# Patient Record
Sex: FEMALE | Race: WHITE | NOT HISPANIC OR LATINO | Employment: OTHER | URBAN - METROPOLITAN AREA
[De-identification: names, ages, dates, MRNs, and addresses within clinical notes are randomized per-mention and may not be internally consistent; named-entity substitution may affect disease eponyms.]

---

## 2017-03-13 ENCOUNTER — OFFICE VISIT (OUTPATIENT)
Dept: CARDIOLOGY | Facility: CLINIC | Age: 67
End: 2017-03-13

## 2017-03-13 VITALS
SYSTOLIC BLOOD PRESSURE: 130 MMHG | HEART RATE: 57 BPM | RESPIRATION RATE: 18 BRPM | HEIGHT: 64 IN | WEIGHT: 201.6 LBS | BODY MASS INDEX: 34.42 KG/M2 | DIASTOLIC BLOOD PRESSURE: 80 MMHG

## 2017-03-13 DIAGNOSIS — E78.2 MIXED HYPERLIPIDEMIA: ICD-10-CM

## 2017-03-13 DIAGNOSIS — R07.2 PRECORDIAL PAIN: Primary | ICD-10-CM

## 2017-03-13 DIAGNOSIS — Z82.49 FAMILY HISTORY OF EARLY CAD: ICD-10-CM

## 2017-03-13 DIAGNOSIS — I10 ESSENTIAL HYPERTENSION: ICD-10-CM

## 2017-03-13 PROCEDURE — 93000 ELECTROCARDIOGRAM COMPLETE: CPT | Performed by: INTERNAL MEDICINE

## 2017-03-13 PROCEDURE — 99214 OFFICE O/P EST MOD 30 MIN: CPT | Performed by: INTERNAL MEDICINE

## 2017-03-13 RX ORDER — LAMOTRIGINE 100 MG/1
100 TABLET ORAL 2 TIMES DAILY
COMMUNITY

## 2017-03-13 NOTE — PROGRESS NOTES
PATIENTINFORMATION    Date of Office Visit: 2017  Encounter Provider: Elsy King MD  Place of Service: [facility] CARDIOLOGY  Patient Name: Geraldine Holloway  : 1950    Subjective:     Encounter Date:2017      Patient ID: Geraldine Holloway is a 66 y.o. female.      History of Present Illness     This is a lady with risk factors that include hypertension, hyperlipidemia and a strong family history of heart disease, premature coronary artery disease.   I saw her in  for chest pain.  At that time, I did a nuclear stress test which was normal.      She comes in today with complaints of chest pain.  She noticed this last summer while she was gardening.  About two weeks ago she was doing housework when she had the chest pain, which she describes as a tightness in her chest.  There is no radiation.  It is associated with shortness of breath, but not nausea or diaphoresis.  It last about five minute and was better when she rested.  Since that time, though, she has done housework again and has not had any chest pain.         Review of Systems   Constitution: Positive for malaise/fatigue. Negative for fever, weight gain and weight loss.   HENT: Positive for headaches. Negative for ear pain, hearing loss, nosebleeds and sore throat.    Eyes: Negative for double vision, pain, vision loss in left eye and vision loss in right eye.   Cardiovascular:        See history of present illness.   Respiratory: Positive for shortness of breath. Negative for cough, sleep disturbances due to breathing, snoring and wheezing.    Endocrine: Negative for cold intolerance, heat intolerance and polyuria.   Skin: Negative for itching, poor wound healing and rash.   Musculoskeletal: Positive for joint pain. Negative for joint swelling and myalgias.   Gastrointestinal: Negative for abdominal pain, diarrhea, hematochezia, nausea and vomiting.   Genitourinary: Negative for hematuria and hesitancy.  "  Neurological: Negative for numbness, paresthesias and seizures.   Psychiatric/Behavioral: Positive for depression. The patient is nervous/anxious.            ECG 12 Lead  Date/Time: 3/13/2017 12:03 PM  Performed by: MARCELA BAGLEY  Authorized by: MARCELA BAGLEY   Previous ECG: no previous ECG available  Rhythm: sinus rhythm  BPM: 57  Conduction: conduction normal  Clinical impression: normal ECG               Objective:       Visit Vitals   • /80 (BP Location: Right arm, Patient Position: Sitting, Cuff Size: Adult)   • Pulse 57   • Resp 18   • Ht 64\" (162.6 cm)   • Wt 201 lb 9.6 oz (91.4 kg)   • BMI 34.6 kg/m2    Body mass index is 34.6 kg/(m^2).     Physical Exam   Constitutional: She appears well-developed.   HENT:   Head: Normocephalic and atraumatic.   Eyes: Conjunctivae and lids are normal. Pupils are equal, round, and reactive to light. Lids are everted and swept, no foreign bodies found.   Neck: Normal range of motion. No JVD present. Carotid bruit is not present. No tracheal deviation present. No thyroid mass present.   Cardiovascular: Normal rate, regular rhythm and normal heart sounds.    Pulses:       Dorsalis pedis pulses are 2+ on the right side, and 2+ on the left side.   Pulmonary/Chest: Effort normal and breath sounds normal.   Abdominal: Normal appearance and bowel sounds are normal.   Musculoskeletal: Normal range of motion.   Neurological: She is alert. She has normal strength.   Skin: Skin is warm, dry and intact.   Psychiatric: She has a normal mood and affect. Her behavior is normal.   Vitals reviewed.          Assessment/Plan:        1. Chest pain.  I am certainly concerned.  She has a very strong family history of premature coronary artery disease. I am going to check a PET stress test.    2. Hypertension.  Her blood pressure is controlled.    3. Hyperlipidemia.   She is on Pravachol.     4. Hypothyroidism.    5. Seizure disorder.         I will call her to go over the results of " her nuclear stress test when it is available.         Orders Placed This Encounter   Procedures   • Stress Test With Pet Myocardial Perfusion (Multi-Study)     Standing Status:   Future     Standing Expiration Date:   3/13/2018     Order Specific Question:   What stress agent will be used?     Answer:   Regadenoson (Lexiscan)     Order Specific Question:   Difficulty walking criteria?     Answer:   Musculoskeletal (hips, knees, feet, back, amputee)     Order Specific Question:   Reason for exam?     Answer:   Chest Pain   • ECG 12 Lead     This order was created via procedure documentation      Geraldine Holloway   Home Medication Instructions JACI:    Printed on:03/13/17 0557   Medication Information                      atenolol (TENORMIN) 50 MG tablet  Take 1 tablet by mouth.             Calcium Citrate-Vitamin D 250-200 MG-UNIT tablet  Take  by mouth Daily.             Calcium-Phosphorus-Vitamin D 250-107-500 MG-MG-UNIT chewable tablet  Chew.             clonazePAM (KlonoPIN) 0.5 MG tablet  Take  by mouth 3 (Three) Times a Day.             Cyanocobalamin (VITAMIN B-12 PO)  Take  by mouth.             lamoTRIgine (LaMICtal) 100 MG tablet  Take 100 mg by mouth 2 (Two) Times a Day.             levETIRAcetam (KEPPRA) 1000 MG tablet  Take  by mouth.             levothyroxine (SYNTHROID, LEVOTHROID) 25 MCG tablet  Take 25 mcg by mouth.             losartan-hydrochlorothiazide (HYZAAR) 100-12.5 MG per tablet  Take 1 tablet by mouth.             mometasone (NASONEX) 50 MCG/ACT nasal spray  into each nostril.             Multiple Vitamin tablet  Take  by mouth.             Omega-3 Fatty Acids (FISH OIL PO)  Take  by mouth.             pravastatin (PRAVACHOL) 80 MG tablet               sertraline (ZOLOFT) 100 MG tablet  Take  by mouth.                        Elsy King MD  03/13/17  12:06 PM

## 2017-03-20 ENCOUNTER — HOSPITAL ENCOUNTER (OUTPATIENT)
Dept: CARDIOLOGY | Facility: HOSPITAL | Age: 67
Discharge: HOME | End: 2017-03-20
Attending: INTERNAL MEDICINE | Admitting: INTERNAL MEDICINE

## 2017-03-20 DIAGNOSIS — R07.2 PRECORDIAL PAIN: ICD-10-CM

## 2017-03-20 DIAGNOSIS — E78.2 MIXED HYPERLIPIDEMIA: ICD-10-CM

## 2017-03-20 DIAGNOSIS — I10 ESSENTIAL HYPERTENSION: ICD-10-CM

## 2017-03-20 LAB
BH CV NUCLEAR PRIOR STUDY: 3
BH CV STRESS BP STAGE 1: NORMAL
BH CV STRESS COMMENTS STAGE 1: NORMAL
BH CV STRESS DOSE REGADENOSON STAGE 1: 0.4
BH CV STRESS DURATION MIN STAGE 1: 0
BH CV STRESS DURATION SEC STAGE 1: 15
BH CV STRESS HR STAGE 1: 90
BH CV STRESS PROTOCOL 1: NORMAL
BH CV STRESS RECOVERY BP: NORMAL MMHG
BH CV STRESS RECOVERY HR: 77 BPM
BH CV STRESS STAGE 1: 1
LV EF NUC BP: 77 %
MAXIMAL PREDICTED HEART RATE: 153 BPM
PERCENT MAX PREDICTED HR: 58.82 %
STRESS BASELINE BP: NORMAL MMHG
STRESS BASELINE HR: 53 BPM
STRESS PERCENT HR: 69 %
STRESS POST EXERCISE DUR SEC: 15 SEC
STRESS POST PEAK BP: NORMAL MMHG
STRESS POST PEAK HR: 90 BPM
STRESS TARGET HR: 130 BPM

## 2017-03-20 PROCEDURE — 25010000002 REGADENOSON 0.4 MG/5ML SOLUTION: Performed by: INTERNAL MEDICINE

## 2017-03-20 PROCEDURE — 93018 CV STRESS TEST I&R ONLY: CPT | Performed by: INTERNAL MEDICINE

## 2017-03-20 PROCEDURE — 78492 MYOCRD IMG PET MLT RST&STRS: CPT

## 2017-03-20 PROCEDURE — 93016 CV STRESS TEST SUPVJ ONLY: CPT | Performed by: INTERNAL MEDICINE

## 2017-03-20 PROCEDURE — 0 RUBIDIUM CHLORIDE: Performed by: INTERNAL MEDICINE

## 2017-03-20 PROCEDURE — A9555 RB82 RUBIDIUM: HCPCS | Performed by: INTERNAL MEDICINE

## 2017-03-20 PROCEDURE — 93017 CV STRESS TEST TRACING ONLY: CPT

## 2017-03-20 PROCEDURE — 78492 MYOCRD IMG PET MLT RST&STRS: CPT | Performed by: INTERNAL MEDICINE

## 2017-03-20 RX ADMIN — RUBIDIUM CHLORIDE RB-82 1 DOSE: 150 INJECTION, SOLUTION INTRAVENOUS at 09:27

## 2017-03-20 RX ADMIN — RUBIDIUM CHLORIDE RB-82 1 DOSE: 150 INJECTION, SOLUTION INTRAVENOUS at 09:37

## 2017-03-20 RX ADMIN — REGADENOSON 0.4 MG: 0.08 INJECTION, SOLUTION INTRAVENOUS at 09:37

## 2017-03-21 ENCOUNTER — TELEPHONE (OUTPATIENT)
Dept: CARDIOLOGY | Facility: CLINIC | Age: 67
End: 2017-03-21

## 2019-09-28 ENCOUNTER — APPOINTMENT (OUTPATIENT)
Dept: CT IMAGING | Facility: HOSPITAL | Age: 69
End: 2019-09-28

## 2019-09-28 ENCOUNTER — HOSPITAL ENCOUNTER (EMERGENCY)
Facility: HOSPITAL | Age: 69
Discharge: HOME | End: 2019-09-28
Attending: EMERGENCY MEDICINE | Admitting: EMERGENCY MEDICINE

## 2019-09-28 VITALS
TEMPERATURE: 98.7 F | RESPIRATION RATE: 16 BRPM | BODY MASS INDEX: 35.61 KG/M2 | OXYGEN SATURATION: 96 % | HEIGHT: 64 IN | WEIGHT: 208.56 LBS | SYSTOLIC BLOOD PRESSURE: 146 MMHG | DIASTOLIC BLOOD PRESSURE: 71 MMHG | HEART RATE: 54 BPM

## 2019-09-28 DIAGNOSIS — H81.10 BENIGN PAROXYSMAL VERTIGO, UNSPECIFIED LATERALITY: Primary | ICD-10-CM

## 2019-09-28 LAB
ALBUMIN SERPL-MCNC: 4.4 G/DL (ref 3.5–5.2)
ALBUMIN/GLOB SERPL: 2.3 G/DL
ALP SERPL-CCNC: 93 U/L (ref 39–117)
ALT SERPL W P-5'-P-CCNC: 14 U/L (ref 1–33)
ANION GAP SERPL CALCULATED.3IONS-SCNC: 8.5 MMOL/L (ref 5–15)
AST SERPL-CCNC: 19 U/L (ref 1–32)
BASOPHILS # BLD AUTO: 0.06 10*3/MM3 (ref 0–0.2)
BASOPHILS NFR BLD AUTO: 1.3 % (ref 0–1.5)
BILIRUB SERPL-MCNC: 0.5 MG/DL (ref 0.2–1.2)
BUN BLD-MCNC: 15 MG/DL (ref 8–23)
BUN/CREAT SERPL: 17.4 (ref 7–25)
CALCIUM SPEC-SCNC: 9.2 MG/DL (ref 8.6–10.5)
CHLORIDE SERPL-SCNC: 108 MMOL/L (ref 98–107)
CO2 SERPL-SCNC: 28.5 MMOL/L (ref 22–29)
CREAT BLD-MCNC: 0.86 MG/DL (ref 0.57–1)
DEPRECATED RDW RBC AUTO: 44.6 FL (ref 37–54)
EOSINOPHIL # BLD AUTO: 0.13 10*3/MM3 (ref 0–0.4)
EOSINOPHIL NFR BLD AUTO: 2.8 % (ref 0.3–6.2)
ERYTHROCYTE [DISTWIDTH] IN BLOOD BY AUTOMATED COUNT: 13.3 % (ref 12.3–15.4)
GFR SERPL CREATININE-BSD FRML MDRD: 65 ML/MIN/1.73
GLOBULIN UR ELPH-MCNC: 1.9 GM/DL
GLUCOSE BLD-MCNC: 92 MG/DL (ref 65–99)
HCT VFR BLD AUTO: 39.8 % (ref 34–46.6)
HGB BLD-MCNC: 13.4 G/DL (ref 12–15.9)
HOLD SPECIMEN: NORMAL
HOLD SPECIMEN: NORMAL
IMM GRANULOCYTES # BLD AUTO: 0.01 10*3/MM3 (ref 0–0.05)
IMM GRANULOCYTES NFR BLD AUTO: 0.2 % (ref 0–0.5)
INR PPP: 1.05 (ref 0.9–1.1)
LYMPHOCYTES # BLD AUTO: 2.04 10*3/MM3 (ref 0.7–3.1)
LYMPHOCYTES NFR BLD AUTO: 43.2 % (ref 19.6–45.3)
MCH RBC QN AUTO: 30.3 PG (ref 26.6–33)
MCHC RBC AUTO-ENTMCNC: 33.7 G/DL (ref 31.5–35.7)
MCV RBC AUTO: 90 FL (ref 79–97)
MONOCYTES # BLD AUTO: 0.34 10*3/MM3 (ref 0.1–0.9)
MONOCYTES NFR BLD AUTO: 7.2 % (ref 5–12)
NEUTROPHILS # BLD AUTO: 2.14 10*3/MM3 (ref 1.7–7)
NEUTROPHILS NFR BLD AUTO: 45.3 % (ref 42.7–76)
NRBC BLD AUTO-RTO: 0 /100 WBC (ref 0–0.2)
PLATELET # BLD AUTO: 188 10*3/MM3 (ref 140–450)
PMV BLD AUTO: 9 FL (ref 6–12)
POTASSIUM BLD-SCNC: 3.9 MMOL/L (ref 3.5–5.2)
PROT SERPL-MCNC: 6.3 G/DL (ref 6–8.5)
PROTHROMBIN TIME: 13.4 SECONDS (ref 11.7–14.2)
RBC # BLD AUTO: 4.42 10*6/MM3 (ref 3.77–5.28)
SODIUM BLD-SCNC: 145 MMOL/L (ref 136–145)
WBC NRBC COR # BLD: 4.72 10*3/MM3 (ref 3.4–10.8)
WHOLE BLOOD HOLD SPECIMEN: NORMAL
WHOLE BLOOD HOLD SPECIMEN: NORMAL

## 2019-09-28 PROCEDURE — 93010 ELECTROCARDIOGRAM REPORT: CPT | Performed by: INTERNAL MEDICINE

## 2019-09-28 PROCEDURE — 99284 EMERGENCY DEPT VISIT MOD MDM: CPT

## 2019-09-28 PROCEDURE — 85610 PROTHROMBIN TIME: CPT | Performed by: EMERGENCY MEDICINE

## 2019-09-28 PROCEDURE — 80053 COMPREHEN METABOLIC PANEL: CPT | Performed by: EMERGENCY MEDICINE

## 2019-09-28 PROCEDURE — 85025 COMPLETE CBC W/AUTO DIFF WBC: CPT | Performed by: EMERGENCY MEDICINE

## 2019-09-28 PROCEDURE — 93005 ELECTROCARDIOGRAM TRACING: CPT | Performed by: EMERGENCY MEDICINE

## 2019-09-28 PROCEDURE — 70450 CT HEAD/BRAIN W/O DYE: CPT

## 2019-09-28 RX ORDER — SODIUM CHLORIDE 0.9 % (FLUSH) 0.9 %
10 SYRINGE (ML) INJECTION AS NEEDED
Status: DISCONTINUED | OUTPATIENT
Start: 2019-09-28 | End: 2019-09-28 | Stop reason: HOSPADM

## 2019-09-28 RX ORDER — ZONISAMIDE 100 MG/1
200 CAPSULE ORAL NIGHTLY
COMMUNITY

## 2019-09-28 RX ORDER — PROCHLORPERAZINE MALEATE 5 MG/1
5 TABLET ORAL EVERY 6 HOURS PRN
COMMUNITY

## 2019-09-28 RX ADMIN — SODIUM CHLORIDE 1000 ML: 9 INJECTION, SOLUTION INTRAVENOUS at 16:39

## 2019-11-01 ENCOUNTER — TELEPHONE (OUTPATIENT)
Dept: NEUROSURGERY | Facility: CLINIC | Age: 69
End: 2019-11-01

## 2019-11-01 NOTE — TELEPHONE ENCOUNTER
I did not speak with patient, however, I called and spoke with her and she needs a record release form.    I told her that I would mail her one today

## 2019-11-01 NOTE — TELEPHONE ENCOUNTER
"PT CALLED, STATES SHE SPOKE WITH DR COVARRUBIAS'S NURSE A FEW MINUTES AGO, BUT DIDN'T REMEMBER THE NAME. SHE STATES SHE HAS HAD A STROKE AND WE HAD SENT HER \"A RELEASE\" BUT HER  INADVERTENTLY TORE IT WHILE OPENING THE ENVELOPE AND NEEDS ANOTHER ONE.  I WAS UNABLE TO FIND A TELEPHONE NOTE, A LETTER OR ANYTHING SCANNED INTO MEDIA FOLDER, AND PT WAS LAST SEEN IN 2009 BY ISAIAS MCMAHON.  ANY IDEA WHO MAY HAVE HELPED HER OR WHO HAS THIS RELEASE?    CALLBACK: [phone]  "

## 2020-01-29 ENCOUNTER — TRANSCRIBE ORDERS (OUTPATIENT)
Dept: ADMINISTRATIVE | Facility: HOSPITAL | Age: 70
End: 2020-01-29

## 2020-01-29 DIAGNOSIS — N95.0 POSTMENOPAUSAL BLEEDING: Primary | ICD-10-CM

## 2020-02-03 ENCOUNTER — HOSPITAL ENCOUNTER (OUTPATIENT)
Dept: ULTRASOUND IMAGING | Facility: HOSPITAL | Age: 70
Discharge: HOME | End: 2020-02-03
Admitting: OBSTETRICS & GYNECOLOGY

## 2020-02-03 DIAGNOSIS — N95.0 POSTMENOPAUSAL BLEEDING: ICD-10-CM

## 2020-02-03 PROCEDURE — 76856 US EXAM PELVIC COMPLETE: CPT

## 2020-02-03 PROCEDURE — 76830 TRANSVAGINAL US NON-OB: CPT

## 2020-04-27 ENCOUNTER — PREP FOR SURGERY (OUTPATIENT)
Dept: OTHER | Facility: HOSPITAL | Age: 70
End: 2020-04-27

## 2020-04-27 DIAGNOSIS — N81.4 UTEROVAGINAL PROLAPSE: Primary | ICD-10-CM

## 2020-04-27 DIAGNOSIS — N39.3 SUI (STRESS URINARY INCONTINENCE, FEMALE): ICD-10-CM

## 2020-04-27 PROBLEM — N81.2 UTEROVAGINAL PROLAPSE, INCOMPLETE: Status: ACTIVE | Noted: 2020-04-27

## 2020-04-27 RX ORDER — SODIUM CHLORIDE 0.9 % (FLUSH) 0.9 %
3 SYRINGE (ML) INJECTION EVERY 12 HOURS SCHEDULED
Status: CANCELLED | OUTPATIENT
Start: 2020-06-02

## 2020-04-27 RX ORDER — SODIUM CHLORIDE 0.9 % (FLUSH) 0.9 %
10 SYRINGE (ML) INJECTION AS NEEDED
Status: CANCELLED | OUTPATIENT
Start: 2020-06-02

## 2020-04-27 RX ORDER — PHENAZOPYRIDINE HYDROCHLORIDE 200 MG/1
200 TABLET, FILM COATED ORAL ONCE
Status: CANCELLED | OUTPATIENT
Start: 2020-06-02 | End: 2020-04-27

## 2020-04-27 RX ORDER — CEFAZOLIN SODIUM 2 G/100ML
2 INJECTION, SOLUTION INTRAVENOUS ONCE
Status: CANCELLED | OUTPATIENT
Start: 2020-06-02 | End: 2020-04-27

## 2020-05-11 ENCOUNTER — TRANSCRIBE ORDERS (OUTPATIENT)
Dept: ADMINISTRATIVE | Facility: HOSPITAL | Age: 70
End: 2020-05-11

## 2020-05-11 DIAGNOSIS — R31.0 GROSS HEMATURIA: Primary | ICD-10-CM

## 2020-05-12 ENCOUNTER — APPOINTMENT (OUTPATIENT)
Dept: PREADMISSION TESTING | Facility: HOSPITAL | Age: 70
End: 2020-05-12

## 2020-05-12 ENCOUNTER — TRANSCRIBE ORDERS (OUTPATIENT)
Dept: SLEEP MEDICINE | Facility: HOSPITAL | Age: 70
End: 2020-05-12

## 2020-05-12 VITALS
BODY MASS INDEX: 34.74 KG/M2 | HEIGHT: 64 IN | SYSTOLIC BLOOD PRESSURE: 146 MMHG | OXYGEN SATURATION: 98 % | DIASTOLIC BLOOD PRESSURE: 77 MMHG | TEMPERATURE: 97.7 F | WEIGHT: 203.5 LBS | RESPIRATION RATE: 18 BRPM | HEART RATE: 62 BPM

## 2020-05-12 DIAGNOSIS — Z01.818 OTHER SPECIFIED PRE-OPERATIVE EXAMINATION: Primary | ICD-10-CM

## 2020-05-12 LAB
ANION GAP SERPL CALCULATED.3IONS-SCNC: 11.6 MMOL/L (ref 5–15)
BUN BLD-MCNC: 20 MG/DL (ref 8–23)
BUN/CREAT SERPL: 22.5 (ref 7–25)
CALCIUM SPEC-SCNC: 9.4 MG/DL (ref 8.6–10.5)
CHLORIDE SERPL-SCNC: 105 MMOL/L (ref 98–107)
CO2 SERPL-SCNC: 24.4 MMOL/L (ref 22–29)
CREAT BLD-MCNC: 0.89 MG/DL (ref 0.57–1)
DEPRECATED RDW RBC AUTO: 44.3 FL (ref 37–54)
ERYTHROCYTE [DISTWIDTH] IN BLOOD BY AUTOMATED COUNT: 13.3 % (ref 12.3–15.4)
GFR SERPL CREATININE-BSD FRML MDRD: 63 ML/MIN/1.73
GLUCOSE BLD-MCNC: 101 MG/DL (ref 65–99)
HCT VFR BLD AUTO: 36.5 % (ref 34–46.6)
HGB BLD-MCNC: 12.5 G/DL (ref 12–15.9)
MCH RBC QN AUTO: 30.6 PG (ref 26.6–33)
MCHC RBC AUTO-ENTMCNC: 34.2 G/DL (ref 31.5–35.7)
MCV RBC AUTO: 89.5 FL (ref 79–97)
PLATELET # BLD AUTO: 192 10*3/MM3 (ref 140–450)
PMV BLD AUTO: 9.1 FL (ref 6–12)
POTASSIUM BLD-SCNC: 3.3 MMOL/L (ref 3.5–5.2)
RBC # BLD AUTO: 4.08 10*6/MM3 (ref 3.77–5.28)
SODIUM BLD-SCNC: 141 MMOL/L (ref 136–145)
WBC NRBC COR # BLD: 4.85 10*3/MM3 (ref 3.4–10.8)

## 2020-05-12 PROCEDURE — 93010 ELECTROCARDIOGRAM REPORT: CPT | Performed by: INTERNAL MEDICINE

## 2020-05-12 PROCEDURE — 85027 COMPLETE CBC AUTOMATED: CPT | Performed by: OBSTETRICS & GYNECOLOGY

## 2020-05-12 PROCEDURE — 93005 ELECTROCARDIOGRAM TRACING: CPT

## 2020-05-12 PROCEDURE — 36415 COLL VENOUS BLD VENIPUNCTURE: CPT

## 2020-05-12 PROCEDURE — 80048 BASIC METABOLIC PNL TOTAL CA: CPT | Performed by: OBSTETRICS & GYNECOLOGY

## 2020-05-12 NOTE — DISCHARGE INSTRUCTIONS
Take the following medications the morning of surgery:  CLONAZEPAM.LAMOTRIGINE,LEVETIRACETAM,LEVOTHYROXINE AND SERTRALINE      General Instructions: CLEAR LIQUIDS UNTIL 4:30 AM MORNING OF SURGERY  • Do not eat solid food after midnight the night before surgery.  • You may drink clear liquids day of surgery but must stop at least one hour before your hospital arrival time.  • It is beneficial for you to have a clear drink that contains carbohydrates the day of surgery.  We suggest a 12 to 20 ounce bottle of Gatorade or Powerade for non-diabetic patients or a 12 to 20 ounce bottle of G2 or Powerade Zero for diabetic patients. (Pediatric patients, are not advised to drink a 12 to 20 ounce carbohydrate drink)    Clear liquids are liquids you can see through.  Nothing red in color.     Plain water                               Sports drinks  Sodas                                   Gelatin (Jell-O)  Fruit juices without pulp such as white grape juice and apple juice  Popsicles that contain no fruit or yogurt  Tea or coffee (no cream or milk added)  Gatorade / Powerade  G2 / Powerade Zero    • Infants may have breast milk up to four hours before surgery.  • Infants drinking formula may drink formula up to six hours before surgery.   • Patients who avoid smoking, chewing tobacco and alcohol for 4 weeks prior to surgery have a reduced risk of post-operative complications.  Quit smoking as many days before surgery as you can.  • Do not smoke, use chewing tobacco or drink alcohol the day of surgery.   • If applicable bring your C-PAP/ BI-PAP machine.  • Bring any papers given to you in the doctor’s office.  • Wear clean comfortable clothes.  • Do not wear contact lenses, false eyelashes or make-up.  Bring a case for your glasses.   • Bring crutches or walker if applicable.  • Remove all piercings.  Leave jewelry and any other valuables at home.  • Hair extensions with metal clips must be removed prior to surgery.  • The  Pre-Admission Testing nurse will instruct you to bring medications if unable to obtain an accurate list in Pre-Admission Testing.        If you were given a blood bank ID arm band remember to bring it with you the day of surgery.    Preventing a Surgical Site Infection:  • For 2 to 3 days before surgery, avoid shaving with a razor because the razor can irritate skin and make it easier to develop an infection.    • Any areas of open skin can increase the risk of a post-operative wound infection by allowing bacteria to enter and travel throughout the body.  Notify your surgeon if you have any skin wounds / rashes even if it is not near the expected surgical site.  The area will need assessed to determine if surgery should be delayed until it is healed.  • The night prior to surgery shower using a fresh bar of anti-bacterial soap (such as Dial) and clean washcloth.  Sleep in a clean bed with clean clothing.  Do not allow pets to sleep with you.  • Shower on the morning of surgery using a fresh bar of anti-bacterial soap (such as Dial) and clean washcloth.  Dry with a clean towel and dress in clean clothing.  • Ask your surgeon if you will be receiving antibiotics prior to surgery.  • Make sure you, your family, and all healthcare providers clean their hands with soap and water or an alcohol based hand  before caring for you or your wound.    Day of surgery: 6/2/2020 ARRIVAL TIME 5:30 AM  Your arrival time is approximately two hours before your scheduled surgery time.  Upon arrival, a Pre-op nurse and Anesthesiologist will review your health history, obtain vital signs, and answer questions you may have.  The only belongings needed at this time will be a list of your home medications and if applicable your C-PAP/BI-PAP machine.  If you are staying overnight your family can leave the rest of your belongings in the car and bring them to your room later.  A Pre-op nurse will start an IV and you may receive  medication in preparation for surgery, including something to help you relax.  Your family will be able to see you in the Pre-op area.  Two visitors at a time will be allowed in the Pre-op room.  While you are in surgery your family should notify the waiting room  if they leave the waiting room area and provide a contact phone number.    Please be aware that surgery does come with discomfort.  We want to make every effort to control your discomfort so please discuss any uncontrolled symptoms with your nurse.   Your doctor will most likely have prescribed pain medications.      If you are going home after surgery you will receive individualized written care instructions before being discharged.  A responsible adult must drive you to and from the hospital on the day of your surgery and stay with you for 24 hours.    If you are staying overnight following surgery, you will be transported to your hospital room following the recovery period.  [facility] has all private rooms.    If you have any questions please call Pre-Admission Testing at [phone].  Deductibles and co-payments are collected on the day of service. Please be prepared to pay the required co-pay, deductible or deposit on the day of service as defined by your plan.    Self-Quarantine Prior to Surgery    • Stay at home. Do not leave your home after you have been given the Covid test for your upcoming surgery.   • Wash your hands often with soap and water for at least 20 seconds especially after you have been in a public place, or after blowing your nose, coughing, or sneezing.  • If soap and water are not readily available, use a hand  that contains at least 60% alcohol. Cover all surfaces of your hands and rub them together until they feel dry.  • Avoid touching your eyes, nose, and mouth with unwashed hands.  • Take everyday precautions to keep space between yourself and others (stay 6 feet away, which is about two  arm lengths).  Remember that some people without symptoms may be able to spread virus.  • You should stay in a specific room away from others if possible.  Stay at least 6 feet away from others in the home if you cannot have a dedicated room to yourself. Do not have visitors.   • Wear a mask around others in your home if you are unable to stay in a separate room or 6 feet apart. If you are unable to wear a mask, have your family member wear a mask if they must be within 6 feet of you.   • Do not snuggle with your pet. While the CDC says there is no evidence that pets can spread COVID-19 or be infected from humans, it is probably best to avoid “petting, snuggling, being kissed or licked and sharing food (during self-quarantine)”, according to the CDC.   • Do not share anything - Have your own utensils, drinking glass, dishes, towels and bedding.   • Do not share a bathroom with others, if possible.   • Clean and disinfect frequently touched services daily. This includes tables, doorknobs, light switches, countertops, handles, desks, phones, keyboards, toilets, faucets, and sinks.  • Do not travel prior to surgery.    Call your surgeon immediately if you experience any of the following symptoms:  • Sore Throat      • Shortness of Breath or difficulty breathing  • Cough  • Chills  • Body soreness or muscle pain  • Headache  • Fever  • New loss of taste or smell  • Do not arrive for your surgery ill.  Your procedure will need to be rescheduled to another time.  You will need to call your physician before the day of surgery to avoid any unnecessary exposure to hospital staff as well as other patients.    CHLORHEXIDINE CLOTH INSTRUCTIONS  The morning of surgery follow these instructions using the Chlorhexidine cloths you've been given.  These steps reduce bacteria on the body.  Do not use the cloths near your eyes, ears mouth, genitalia or on open wounds.  Throw the cloths away after use but do not try to flush them down  a toilet.      • Open and remove one cloth at a time from the package.    • Leave the cloth unfolded and begin the bathing.  • Massage the skin with the cloths using gentle pressure to remove bacteria.  Do not scrub harshly.   • Follow the steps below with one 2% CHG cloth per area (6 total cloths).  • One cloth for neck, shoulders and chest.  • One cloth for both arms, hands, fingers and underarms (do underarms last).  • One cloth for the abdomen followed by groin.  • One cloth for right leg and foot including between the toes.  • One cloth for left leg and foot including between the toes.  • The last cloth is to be used for the back of the neck, back and buttocks.    Allow the CHG to air dry 3 minutes on the skin which will give it time to work and decrease the chance of irritation.  The skin may feel sticky until it is dry.  Do not rinse with water or any other liquid or you will lose the beneficial effects of the CHG.  If mild skin irritation occurs, do rinse the skin to remove the CHG.  Report this to the nurse at time of admission.  Do not apply lotions, creams, ointments, deodorants or perfumes after using the clothes. Dress in clean clothes before coming to the hospital.

## 2020-05-20 ENCOUNTER — HOSPITAL ENCOUNTER (OUTPATIENT)
Dept: CT IMAGING | Facility: HOSPITAL | Age: 70
Discharge: HOME | End: 2020-05-20
Admitting: OBSTETRICS & GYNECOLOGY

## 2020-05-20 DIAGNOSIS — R31.0 GROSS HEMATURIA: ICD-10-CM

## 2020-05-20 PROCEDURE — 74176 CT ABD & PELVIS W/O CONTRAST: CPT

## 2020-05-30 ENCOUNTER — APPOINTMENT (OUTPATIENT)
Dept: LAB | Facility: HOSPITAL | Age: 70
End: 2020-05-30

## 2020-05-30 PROCEDURE — U0004 COV-19 TEST NON-CDC HGH THRU: HCPCS | Performed by: INTERNAL MEDICINE

## 2020-06-01 PROBLEM — N81.12 CYSTOCELE, LATERAL: Status: ACTIVE | Noted: 2020-06-01

## 2020-06-01 PROBLEM — N81.11 CYSTOCELE, MIDLINE: Status: ACTIVE | Noted: 2020-06-01

## 2020-06-01 PROBLEM — N81.6 RECTOCELE: Status: ACTIVE | Noted: 2020-06-01

## 2020-06-01 PROBLEM — N81.5 VAGINAL ENTEROCELE: Status: ACTIVE | Noted: 2020-06-01

## 2020-06-01 PROBLEM — N39.3 FEMALE STRESS INCONTINENCE: Status: ACTIVE | Noted: 2020-06-01

## 2020-06-01 PROBLEM — N81.82 INCOMPETENCE OR WEAKENING OF PUBOCERVICAL TISSUE: Status: ACTIVE | Noted: 2020-06-01

## 2020-06-01 PROBLEM — N81.83 INCOMPETENCE OR WEAKENING OF RECTOVAGINAL TISSUE: Status: ACTIVE | Noted: 2020-06-01

## 2020-06-01 PROBLEM — N81.89 LOSS OF PERINEAL BODY, FEMALE: Status: ACTIVE | Noted: 2020-06-01

## 2020-06-01 LAB
REF LAB TEST METHOD: NORMAL
SARS-COV-2 RNA RESP QL NAA+PROBE: NOT DETECTED

## 2020-06-02 ENCOUNTER — ANESTHESIA (OUTPATIENT)
Dept: PERIOP | Facility: HOSPITAL | Age: 70
End: 2020-06-02

## 2020-06-02 ENCOUNTER — ANESTHESIA EVENT (OUTPATIENT)
Dept: PERIOP | Facility: HOSPITAL | Age: 70
End: 2020-06-02

## 2020-06-02 ENCOUNTER — HOSPITAL ENCOUNTER (INPATIENT)
Facility: HOSPITAL | Age: 70
LOS: 1 days | Discharge: HOME | End: 2020-06-03
Attending: OBSTETRICS & GYNECOLOGY | Admitting: OBSTETRICS & GYNECOLOGY

## 2020-06-02 DIAGNOSIS — N81.4 UTEROVAGINAL PROLAPSE: ICD-10-CM

## 2020-06-02 PROBLEM — E07.9 DISEASE OF THYROID GLAND: Status: ACTIVE | Noted: 2020-06-02

## 2020-06-02 LAB
ABO GROUP BLD: NORMAL
BLD GP AB SCN SERPL QL: NEGATIVE
RH BLD: POSITIVE
T&S EXPIRATION DATE: NORMAL

## 2020-06-02 PROCEDURE — 25010000002 NEOSTIGMINE PER 0.5 MG: Performed by: NURSE ANESTHETIST, CERTIFIED REGISTERED

## 2020-06-02 PROCEDURE — 25010000002 DEXAMETHASONE PER 1 MG: Performed by: NURSE ANESTHETIST, CERTIFIED REGISTERED

## 2020-06-02 PROCEDURE — 0UT94ZZ RESECTION OF UTERUS, PERCUTANEOUS ENDOSCOPIC APPROACH: ICD-10-PCS | Performed by: OBSTETRICS & GYNECOLOGY

## 2020-06-02 PROCEDURE — 0TSD4ZZ REPOSITION URETHRA, PERCUTANEOUS ENDOSCOPIC APPROACH: ICD-10-PCS | Performed by: OBSTETRICS & GYNECOLOGY

## 2020-06-02 PROCEDURE — 25010000002 ONDANSETRON PER 1 MG: Performed by: NURSE ANESTHETIST, CERTIFIED REGISTERED

## 2020-06-02 PROCEDURE — 25010000002 FENTANYL CITRATE (PF) 100 MCG/2ML SOLUTION: Performed by: NURSE ANESTHETIST, CERTIFIED REGISTERED

## 2020-06-02 PROCEDURE — 0UT24ZZ RESECTION OF BILATERAL OVARIES, PERCUTANEOUS ENDOSCOPIC APPROACH: ICD-10-PCS | Performed by: OBSTETRICS & GYNECOLOGY

## 2020-06-02 PROCEDURE — 88305 TISSUE EXAM BY PATHOLOGIST: CPT | Performed by: OBSTETRICS & GYNECOLOGY

## 2020-06-02 PROCEDURE — 25010000003 CEFAZOLIN IN DEXTROSE 2-4 GM/100ML-% SOLUTION: Performed by: OBSTETRICS & GYNECOLOGY

## 2020-06-02 PROCEDURE — 0JQC3ZZ REPAIR PELVIC REGION SUBCUTANEOUS TISSUE AND FASCIA, PERCUTANEOUS APPROACH: ICD-10-PCS | Performed by: OBSTETRICS & GYNECOLOGY

## 2020-06-02 PROCEDURE — 25010000002 PROPOFOL 10 MG/ML EMULSION: Performed by: NURSE ANESTHETIST, CERTIFIED REGISTERED

## 2020-06-02 PROCEDURE — 86850 RBC ANTIBODY SCREEN: CPT | Performed by: OBSTETRICS & GYNECOLOGY

## 2020-06-02 PROCEDURE — 86900 BLOOD TYPING SEROLOGIC ABO: CPT | Performed by: OBSTETRICS & GYNECOLOGY

## 2020-06-02 PROCEDURE — 0UQF3ZZ REPAIR CUL-DE-SAC, PERCUTANEOUS APPROACH: ICD-10-PCS | Performed by: OBSTETRICS & GYNECOLOGY

## 2020-06-02 PROCEDURE — 88302 TISSUE EXAM BY PATHOLOGIST: CPT | Performed by: OBSTETRICS & GYNECOLOGY

## 2020-06-02 PROCEDURE — 86901 BLOOD TYPING SEROLOGIC RH(D): CPT | Performed by: OBSTETRICS & GYNECOLOGY

## 2020-06-02 PROCEDURE — 0TJB8ZZ INSPECTION OF BLADDER, VIA NATURAL OR ARTIFICIAL OPENING ENDOSCOPIC: ICD-10-PCS | Performed by: OBSTETRICS & GYNECOLOGY

## 2020-06-02 PROCEDURE — 0UT74ZZ RESECTION OF BILATERAL FALLOPIAN TUBES, PERCUTANEOUS ENDOSCOPIC APPROACH: ICD-10-PCS | Performed by: OBSTETRICS & GYNECOLOGY

## 2020-06-02 DEVICE — GRFT TISS STRATTICE FIRM 6X10CM: Type: IMPLANTABLE DEVICE | Status: FUNCTIONAL

## 2020-06-02 RX ORDER — FLUMAZENIL 0.1 MG/ML
0.2 INJECTION INTRAVENOUS AS NEEDED
Status: DISCONTINUED | OUTPATIENT
Start: 2020-06-02 | End: 2020-06-02 | Stop reason: HOSPADM

## 2020-06-02 RX ORDER — ONDANSETRON 2 MG/ML
4 INJECTION INTRAMUSCULAR; INTRAVENOUS EVERY 6 HOURS PRN
Status: DISCONTINUED | OUTPATIENT
Start: 2020-06-02 | End: 2020-06-03 | Stop reason: HOSPADM

## 2020-06-02 RX ORDER — PROMETHAZINE HYDROCHLORIDE 25 MG/ML
12.5 INJECTION, SOLUTION INTRAMUSCULAR; INTRAVENOUS EVERY 6 HOURS PRN
Status: DISCONTINUED | OUTPATIENT
Start: 2020-06-02 | End: 2020-06-03 | Stop reason: HOSPADM

## 2020-06-02 RX ORDER — HYDRALAZINE HYDROCHLORIDE 20 MG/ML
5 INJECTION INTRAMUSCULAR; INTRAVENOUS
Status: DISCONTINUED | OUTPATIENT
Start: 2020-06-02 | End: 2020-06-02 | Stop reason: HOSPADM

## 2020-06-02 RX ORDER — FENTANYL CITRATE 50 UG/ML
50 INJECTION, SOLUTION INTRAMUSCULAR; INTRAVENOUS
Status: DISCONTINUED | OUTPATIENT
Start: 2020-06-02 | End: 2020-06-02 | Stop reason: HOSPADM

## 2020-06-02 RX ORDER — MORPHINE SULFATE 2 MG/ML
1 INJECTION, SOLUTION INTRAMUSCULAR; INTRAVENOUS
Status: DISCONTINUED | OUTPATIENT
Start: 2020-06-02 | End: 2020-06-03 | Stop reason: HOSPADM

## 2020-06-02 RX ORDER — OXYCODONE AND ACETAMINOPHEN 10; 325 MG/1; MG/1
1 TABLET ORAL EVERY 4 HOURS PRN
Status: DISCONTINUED | OUTPATIENT
Start: 2020-06-02 | End: 2020-06-03 | Stop reason: HOSPADM

## 2020-06-02 RX ORDER — DEXAMETHASONE SODIUM PHOSPHATE 10 MG/ML
INJECTION INTRAMUSCULAR; INTRAVENOUS AS NEEDED
Status: DISCONTINUED | OUTPATIENT
Start: 2020-06-02 | End: 2020-06-02 | Stop reason: SURG

## 2020-06-02 RX ORDER — FAMOTIDINE 10 MG/ML
20 INJECTION, SOLUTION INTRAVENOUS ONCE
Status: COMPLETED | OUTPATIENT
Start: 2020-06-02 | End: 2020-06-02

## 2020-06-02 RX ORDER — PROPOFOL 10 MG/ML
VIAL (ML) INTRAVENOUS AS NEEDED
Status: DISCONTINUED | OUTPATIENT
Start: 2020-06-02 | End: 2020-06-02 | Stop reason: SURG

## 2020-06-02 RX ORDER — ACETAMINOPHEN 325 MG/1
650 TABLET ORAL ONCE AS NEEDED
Status: DISCONTINUED | OUTPATIENT
Start: 2020-06-02 | End: 2020-06-02 | Stop reason: HOSPADM

## 2020-06-02 RX ORDER — PROMETHAZINE HYDROCHLORIDE 25 MG/1
25 SUPPOSITORY RECTAL ONCE AS NEEDED
Status: DISCONTINUED | OUTPATIENT
Start: 2020-06-02 | End: 2020-06-02 | Stop reason: HOSPADM

## 2020-06-02 RX ORDER — PROMETHAZINE HYDROCHLORIDE 25 MG/ML
12.5 INJECTION, SOLUTION INTRAMUSCULAR; INTRAVENOUS ONCE AS NEEDED
Status: DISCONTINUED | OUTPATIENT
Start: 2020-06-02 | End: 2020-06-02 | Stop reason: HOSPADM

## 2020-06-02 RX ORDER — DIPHENHYDRAMINE HYDROCHLORIDE 50 MG/ML
12.5 INJECTION INTRAMUSCULAR; INTRAVENOUS
Status: DISCONTINUED | OUTPATIENT
Start: 2020-06-02 | End: 2020-06-02 | Stop reason: HOSPADM

## 2020-06-02 RX ORDER — CEFAZOLIN SODIUM 2 G/100ML
2 INJECTION, SOLUTION INTRAVENOUS ONCE
Status: COMPLETED | OUTPATIENT
Start: 2020-06-02 | End: 2020-06-02

## 2020-06-02 RX ORDER — EPHEDRINE SULFATE 50 MG/ML
5 INJECTION, SOLUTION INTRAVENOUS ONCE AS NEEDED
Status: DISCONTINUED | OUTPATIENT
Start: 2020-06-02 | End: 2020-06-02 | Stop reason: HOSPADM

## 2020-06-02 RX ORDER — LEVETIRACETAM 500 MG/1
1500 TABLET ORAL 2 TIMES DAILY
Status: DISCONTINUED | OUTPATIENT
Start: 2020-06-02 | End: 2020-06-03 | Stop reason: HOSPADM

## 2020-06-02 RX ORDER — SODIUM CHLORIDE 0.9 % (FLUSH) 0.9 %
10 SYRINGE (ML) INJECTION AS NEEDED
Status: DISCONTINUED | OUTPATIENT
Start: 2020-06-02 | End: 2020-06-02 | Stop reason: HOSPADM

## 2020-06-02 RX ORDER — PHENAZOPYRIDINE HYDROCHLORIDE 200 MG/1
200 TABLET, FILM COATED ORAL ONCE
Status: COMPLETED | OUTPATIENT
Start: 2020-06-02 | End: 2020-06-02

## 2020-06-02 RX ORDER — ALBUTEROL SULFATE 2.5 MG/3ML
2.5 SOLUTION RESPIRATORY (INHALATION) ONCE AS NEEDED
Status: DISCONTINUED | OUTPATIENT
Start: 2020-06-02 | End: 2020-06-02 | Stop reason: HOSPADM

## 2020-06-02 RX ORDER — DEXMEDETOMIDINE HYDROCHLORIDE 4 UG/ML
INJECTION INTRAVENOUS CONTINUOUS PRN
Status: DISCONTINUED | OUTPATIENT
Start: 2020-06-02 | End: 2020-06-02 | Stop reason: SURG

## 2020-06-02 RX ORDER — SODIUM CHLORIDE, SODIUM LACTATE, POTASSIUM CHLORIDE, CALCIUM CHLORIDE 600; 310; 30; 20 MG/100ML; MG/100ML; MG/100ML; MG/100ML
9 INJECTION, SOLUTION INTRAVENOUS CONTINUOUS
Status: DISCONTINUED | OUTPATIENT
Start: 2020-06-02 | End: 2020-06-03 | Stop reason: HOSPADM

## 2020-06-02 RX ORDER — DEXTROSE AND SODIUM CHLORIDE 5; .45 G/100ML; G/100ML
100 INJECTION, SOLUTION INTRAVENOUS CONTINUOUS
Status: DISCONTINUED | OUTPATIENT
Start: 2020-06-02 | End: 2020-06-03 | Stop reason: HOSPADM

## 2020-06-02 RX ORDER — SERTRALINE HYDROCHLORIDE 100 MG/1
100 TABLET, FILM COATED ORAL NIGHTLY
Status: DISCONTINUED | OUTPATIENT
Start: 2020-06-02 | End: 2020-06-03 | Stop reason: HOSPADM

## 2020-06-02 RX ORDER — MAGNESIUM HYDROXIDE 1200 MG/15ML
LIQUID ORAL AS NEEDED
Status: DISCONTINUED | OUTPATIENT
Start: 2020-06-02 | End: 2020-06-02 | Stop reason: HOSPADM

## 2020-06-02 RX ORDER — ROCURONIUM BROMIDE 10 MG/ML
INJECTION, SOLUTION INTRAVENOUS AS NEEDED
Status: DISCONTINUED | OUTPATIENT
Start: 2020-06-02 | End: 2020-06-02 | Stop reason: SURG

## 2020-06-02 RX ORDER — EPHEDRINE SULFATE 50 MG/ML
INJECTION, SOLUTION INTRAVENOUS AS NEEDED
Status: DISCONTINUED | OUTPATIENT
Start: 2020-06-02 | End: 2020-06-02 | Stop reason: SURG

## 2020-06-02 RX ORDER — DIPHENHYDRAMINE HCL 25 MG
25 CAPSULE ORAL
Status: DISCONTINUED | OUTPATIENT
Start: 2020-06-02 | End: 2020-06-02 | Stop reason: HOSPADM

## 2020-06-02 RX ORDER — ATENOLOL 50 MG/1
50 TABLET ORAL NIGHTLY
Status: DISCONTINUED | OUTPATIENT
Start: 2020-06-02 | End: 2020-06-03 | Stop reason: HOSPADM

## 2020-06-02 RX ORDER — CLONAZEPAM 0.5 MG/1
0.5 TABLET ORAL 3 TIMES DAILY
Status: DISCONTINUED | OUTPATIENT
Start: 2020-06-02 | End: 2020-06-03 | Stop reason: HOSPADM

## 2020-06-02 RX ORDER — LIDOCAINE HYDROCHLORIDE 20 MG/ML
INJECTION, SOLUTION INFILTRATION; PERINEURAL AS NEEDED
Status: DISCONTINUED | OUTPATIENT
Start: 2020-06-02 | End: 2020-06-02 | Stop reason: SURG

## 2020-06-02 RX ORDER — IBUPROFEN 400 MG/1
400 TABLET ORAL
Status: DISCONTINUED | OUTPATIENT
Start: 2020-06-02 | End: 2020-06-03 | Stop reason: HOSPADM

## 2020-06-02 RX ORDER — HYDROCHLOROTHIAZIDE 12.5 MG/1
12.5 CAPSULE, GELATIN COATED ORAL
Status: DISCONTINUED | OUTPATIENT
Start: 2020-06-02 | End: 2020-06-03 | Stop reason: HOSPADM

## 2020-06-02 RX ORDER — MORPHINE SULFATE 2 MG/ML
2 INJECTION, SOLUTION INTRAMUSCULAR; INTRAVENOUS
Status: DISCONTINUED | OUTPATIENT
Start: 2020-06-02 | End: 2020-06-03 | Stop reason: HOSPADM

## 2020-06-02 RX ORDER — PROCHLORPERAZINE MALEATE 5 MG/1
5 TABLET ORAL EVERY 6 HOURS PRN
Status: DISCONTINUED | OUTPATIENT
Start: 2020-06-02 | End: 2020-06-03 | Stop reason: HOSPADM

## 2020-06-02 RX ORDER — ESTRADIOL 0.1 MG/G
CREAM VAGINAL AS NEEDED
Status: DISCONTINUED | OUTPATIENT
Start: 2020-06-02 | End: 2020-06-02 | Stop reason: HOSPADM

## 2020-06-02 RX ORDER — LEVOTHYROXINE SODIUM 88 UG/1
TABLET ORAL
COMMUNITY
Start: 2020-05-18 | End: 2022-04-26

## 2020-06-02 RX ORDER — ONDANSETRON 2 MG/ML
INJECTION INTRAMUSCULAR; INTRAVENOUS AS NEEDED
Status: DISCONTINUED | OUTPATIENT
Start: 2020-06-02 | End: 2020-06-02 | Stop reason: SURG

## 2020-06-02 RX ORDER — BUPIVACAINE HYDROCHLORIDE AND EPINEPHRINE 2.5; 5 MG/ML; UG/ML
INJECTION, SOLUTION INFILTRATION; PERINEURAL AS NEEDED
Status: DISCONTINUED | OUTPATIENT
Start: 2020-06-02 | End: 2020-06-02 | Stop reason: HOSPADM

## 2020-06-02 RX ORDER — OXYCODONE HYDROCHLORIDE AND ACETAMINOPHEN 5; 325 MG/1; MG/1
1 TABLET ORAL EVERY 4 HOURS PRN
Status: DISCONTINUED | OUTPATIENT
Start: 2020-06-02 | End: 2020-06-03 | Stop reason: HOSPADM

## 2020-06-02 RX ORDER — LAMOTRIGINE 100 MG/1
100 TABLET ORAL EVERY 12 HOURS SCHEDULED
Status: DISCONTINUED | OUTPATIENT
Start: 2020-06-02 | End: 2020-06-03 | Stop reason: HOSPADM

## 2020-06-02 RX ORDER — CEFAZOLIN SODIUM 2 G/100ML
2 INJECTION, SOLUTION INTRAVENOUS EVERY 8 HOURS
Status: COMPLETED | OUTPATIENT
Start: 2020-06-02 | End: 2020-06-03

## 2020-06-02 RX ORDER — LOSARTAN POTASSIUM 100 MG/1
100 TABLET ORAL
Status: DISCONTINUED | OUTPATIENT
Start: 2020-06-02 | End: 2020-06-03 | Stop reason: HOSPADM

## 2020-06-02 RX ORDER — PROMETHAZINE HYDROCHLORIDE 25 MG/1
25 TABLET ORAL ONCE AS NEEDED
Status: DISCONTINUED | OUTPATIENT
Start: 2020-06-02 | End: 2020-06-02 | Stop reason: HOSPADM

## 2020-06-02 RX ORDER — HYDROCODONE BITARTRATE AND ACETAMINOPHEN 7.5; 325 MG/1; MG/1
1 TABLET ORAL ONCE AS NEEDED
Status: DISCONTINUED | OUTPATIENT
Start: 2020-06-02 | End: 2020-06-02 | Stop reason: HOSPADM

## 2020-06-02 RX ORDER — NALOXONE HCL 0.4 MG/ML
0.2 VIAL (ML) INJECTION AS NEEDED
Status: DISCONTINUED | OUTPATIENT
Start: 2020-06-02 | End: 2020-06-02 | Stop reason: HOSPADM

## 2020-06-02 RX ORDER — NALOXONE HCL 0.4 MG/ML
0.4 VIAL (ML) INJECTION
Status: DISCONTINUED | OUTPATIENT
Start: 2020-06-02 | End: 2020-06-03 | Stop reason: HOSPADM

## 2020-06-02 RX ORDER — GLYCOPYRROLATE 0.2 MG/ML
INJECTION INTRAMUSCULAR; INTRAVENOUS AS NEEDED
Status: DISCONTINUED | OUTPATIENT
Start: 2020-06-02 | End: 2020-06-02 | Stop reason: SURG

## 2020-06-02 RX ORDER — LEVOTHYROXINE SODIUM 88 UG/1
88 TABLET ORAL
Status: DISCONTINUED | OUTPATIENT
Start: 2020-06-03 | End: 2020-06-03 | Stop reason: HOSPADM

## 2020-06-02 RX ORDER — LABETALOL HYDROCHLORIDE 5 MG/ML
5 INJECTION, SOLUTION INTRAVENOUS
Status: DISCONTINUED | OUTPATIENT
Start: 2020-06-02 | End: 2020-06-02 | Stop reason: HOSPADM

## 2020-06-02 RX ORDER — PRAVASTATIN SODIUM 40 MG
80 TABLET ORAL NIGHTLY
Status: DISCONTINUED | OUTPATIENT
Start: 2020-06-02 | End: 2020-06-03 | Stop reason: HOSPADM

## 2020-06-02 RX ORDER — OXYCODONE AND ACETAMINOPHEN 7.5; 325 MG/1; MG/1
1 TABLET ORAL ONCE AS NEEDED
Status: DISCONTINUED | OUTPATIENT
Start: 2020-06-02 | End: 2020-06-02 | Stop reason: HOSPADM

## 2020-06-02 RX ORDER — SODIUM CHLORIDE, SODIUM LACTATE, POTASSIUM CHLORIDE, CALCIUM CHLORIDE 600; 310; 30; 20 MG/100ML; MG/100ML; MG/100ML; MG/100ML
100 INJECTION, SOLUTION INTRAVENOUS CONTINUOUS
Status: DISCONTINUED | OUTPATIENT
Start: 2020-06-02 | End: 2020-06-03 | Stop reason: HOSPADM

## 2020-06-02 RX ORDER — ONDANSETRON 2 MG/ML
4 INJECTION INTRAMUSCULAR; INTRAVENOUS ONCE AS NEEDED
Status: DISCONTINUED | OUTPATIENT
Start: 2020-06-02 | End: 2020-06-02 | Stop reason: HOSPADM

## 2020-06-02 RX ORDER — ONDANSETRON 4 MG/1
4 TABLET, FILM COATED ORAL EVERY 6 HOURS PRN
Status: DISCONTINUED | OUTPATIENT
Start: 2020-06-02 | End: 2020-06-03 | Stop reason: HOSPADM

## 2020-06-02 RX ORDER — PROMETHAZINE HYDROCHLORIDE 25 MG/ML
6.25 INJECTION, SOLUTION INTRAMUSCULAR; INTRAVENOUS
Status: DISCONTINUED | OUTPATIENT
Start: 2020-06-02 | End: 2020-06-02 | Stop reason: HOSPADM

## 2020-06-02 RX ORDER — SODIUM CHLORIDE 0.9 % (FLUSH) 0.9 %
10 SYRINGE (ML) INJECTION EVERY 12 HOURS SCHEDULED
Status: DISCONTINUED | OUTPATIENT
Start: 2020-06-02 | End: 2020-06-02 | Stop reason: HOSPADM

## 2020-06-02 RX ORDER — SODIUM CHLORIDE 0.9 % (FLUSH) 0.9 %
3 SYRINGE (ML) INJECTION EVERY 12 HOURS SCHEDULED
Status: DISCONTINUED | OUTPATIENT
Start: 2020-06-02 | End: 2020-06-02 | Stop reason: HOSPADM

## 2020-06-02 RX ORDER — FENTANYL CITRATE 50 UG/ML
INJECTION, SOLUTION INTRAMUSCULAR; INTRAVENOUS AS NEEDED
Status: DISCONTINUED | OUTPATIENT
Start: 2020-06-02 | End: 2020-06-02 | Stop reason: SURG

## 2020-06-02 RX ORDER — ZONISAMIDE 100 MG/1
200 CAPSULE ORAL NIGHTLY
Status: DISCONTINUED | OUTPATIENT
Start: 2020-06-02 | End: 2020-06-03 | Stop reason: HOSPADM

## 2020-06-02 RX ORDER — HYDROMORPHONE HYDROCHLORIDE 1 MG/ML
0.25 INJECTION, SOLUTION INTRAMUSCULAR; INTRAVENOUS; SUBCUTANEOUS
Status: DISCONTINUED | OUTPATIENT
Start: 2020-06-02 | End: 2020-06-02 | Stop reason: HOSPADM

## 2020-06-02 RX ADMIN — DEXTROSE AND SODIUM CHLORIDE 100 ML/HR: 5; 450 INJECTION, SOLUTION INTRAVENOUS at 20:46

## 2020-06-02 RX ADMIN — ROCURONIUM BROMIDE 10 MG: 10 INJECTION, SOLUTION INTRAVENOUS at 11:06

## 2020-06-02 RX ADMIN — SODIUM CHLORIDE, POTASSIUM CHLORIDE, SODIUM LACTATE AND CALCIUM CHLORIDE: 600; 310; 30; 20 INJECTION, SOLUTION INTRAVENOUS at 13:48

## 2020-06-02 RX ADMIN — IBUPROFEN 400 MG: 400 TABLET, FILM COATED ORAL at 20:46

## 2020-06-02 RX ADMIN — ROCURONIUM BROMIDE 50 MG: 10 INJECTION, SOLUTION INTRAVENOUS at 07:47

## 2020-06-02 RX ADMIN — CEFAZOLIN SODIUM 2 G: 2 INJECTION, SOLUTION INTRAVENOUS at 08:05

## 2020-06-02 RX ADMIN — CLONAZEPAM 0.5 MG: 0.5 TABLET ORAL at 20:46

## 2020-06-02 RX ADMIN — SODIUM CHLORIDE, POTASSIUM CHLORIDE, SODIUM LACTATE AND CALCIUM CHLORIDE 9 ML/HR: 600; 310; 30; 20 INJECTION, SOLUTION INTRAVENOUS at 06:24

## 2020-06-02 RX ADMIN — EPHEDRINE SULFATE 5 MG: 50 INJECTION INTRAVENOUS at 12:56

## 2020-06-02 RX ADMIN — OXYCODONE HYDROCHLORIDE AND ACETAMINOPHEN 1 TABLET: 10; 325 TABLET ORAL at 22:29

## 2020-06-02 RX ADMIN — SODIUM CHLORIDE, POTASSIUM CHLORIDE, SODIUM LACTATE AND CALCIUM CHLORIDE: 600; 310; 30; 20 INJECTION, SOLUTION INTRAVENOUS at 10:04

## 2020-06-02 RX ADMIN — FENTANYL CITRATE 50 MCG: 50 INJECTION INTRAMUSCULAR; INTRAVENOUS at 09:10

## 2020-06-02 RX ADMIN — PHENAZOPYRIDINE 200 MG: 200 TABLET ORAL at 06:24

## 2020-06-02 RX ADMIN — EPHEDRINE SULFATE 10 MG: 50 INJECTION INTRAVENOUS at 13:00

## 2020-06-02 RX ADMIN — FAMOTIDINE 20 MG: 10 INJECTION INTRAVENOUS at 06:24

## 2020-06-02 RX ADMIN — ROCURONIUM BROMIDE 20 MG: 10 INJECTION, SOLUTION INTRAVENOUS at 12:21

## 2020-06-02 RX ADMIN — NEOSTIGMINE METHYLSULFATE 4 MG: 1 INJECTION INTRAMUSCULAR; INTRAVENOUS; SUBCUTANEOUS at 13:59

## 2020-06-02 RX ADMIN — FENTANYL CITRATE 50 MCG: 50 INJECTION INTRAMUSCULAR; INTRAVENOUS at 07:47

## 2020-06-02 RX ADMIN — EPHEDRINE SULFATE 10 MG: 50 INJECTION INTRAVENOUS at 08:21

## 2020-06-02 RX ADMIN — EPHEDRINE SULFATE 10 MG: 50 INJECTION INTRAVENOUS at 07:58

## 2020-06-02 RX ADMIN — DEXMEDETOMIDINE HYDROCHLORIDE 12 MCG/HR: 4 INJECTION INTRAVENOUS at 08:33

## 2020-06-02 RX ADMIN — LEVETIRACETAM 1500 MG: 500 TABLET, FILM COATED ORAL at 20:33

## 2020-06-02 RX ADMIN — LAMOTRIGINE 100 MG: 100 TABLET ORAL at 20:34

## 2020-06-02 RX ADMIN — EPHEDRINE SULFATE 10 MG: 50 INJECTION INTRAVENOUS at 08:06

## 2020-06-02 RX ADMIN — GLYCOPYRROLATE 0.6 MG: 0.2 INJECTION INTRAMUSCULAR; INTRAVENOUS at 13:59

## 2020-06-02 RX ADMIN — EPHEDRINE SULFATE 5 MG: 50 INJECTION INTRAVENOUS at 08:30

## 2020-06-02 RX ADMIN — SERTRALINE 100 MG: 100 TABLET, FILM COATED ORAL at 20:33

## 2020-06-02 RX ADMIN — ONDANSETRON HYDROCHLORIDE 4 MG: 2 SOLUTION INTRAMUSCULAR; INTRAVENOUS at 13:59

## 2020-06-02 RX ADMIN — PRAVASTATIN SODIUM 80 MG: 40 TABLET ORAL at 20:33

## 2020-06-02 RX ADMIN — CEFAZOLIN SODIUM 2 G: 2 INJECTION, SOLUTION INTRAVENOUS at 12:22

## 2020-06-02 RX ADMIN — FENTANYL CITRATE 50 MCG: 50 INJECTION INTRAMUSCULAR; INTRAVENOUS at 11:06

## 2020-06-02 RX ADMIN — LIDOCAINE HYDROCHLORIDE 80 MG: 20 INJECTION, SOLUTION INFILTRATION; PERINEURAL at 07:47

## 2020-06-02 RX ADMIN — FENTANYL CITRATE 50 MCG: 50 INJECTION INTRAMUSCULAR; INTRAVENOUS at 11:31

## 2020-06-02 RX ADMIN — CEFAZOLIN SODIUM 2 G: 2 INJECTION, SOLUTION INTRAVENOUS at 20:46

## 2020-06-02 RX ADMIN — DEXAMETHASONE SODIUM PHOSPHATE 8 MG: 10 INJECTION INTRAMUSCULAR; INTRAVENOUS at 12:34

## 2020-06-02 RX ADMIN — ROCURONIUM BROMIDE 20 MG: 10 INJECTION, SOLUTION INTRAVENOUS at 09:50

## 2020-06-02 RX ADMIN — PROPOFOL 150 MG: 10 INJECTION, EMULSION INTRAVENOUS at 07:47

## 2020-06-02 NOTE — ANESTHESIA PREPROCEDURE EVALUATION
Anesthesia Evaluation     no history of anesthetic complications:               Airway   Mallampati: I  TM distance: >3 FB  Neck ROM: full  Dental - normal exam     Pulmonary    (-) shortness of breath, recent URI  Cardiovascular     (+) hypertension, hyperlipidemia,   (-) dysrhythmias, angina    ROS comment: Borderline T abnormality    Neuro/Psych  (+) seizures, CVA,     (-) dizziness/light headedness, syncope    ROS Comment: Memory difficulty  GI/Hepatic/Renal/Endo    (+) obesity,     (-) liver disease, no renal disease, diabetes    Musculoskeletal     Abdominal    Substance History      OB/GYN          Other   arthritis,                      Anesthesia Plan    ASA 3     general     intravenous induction     Anesthetic plan, all risks, benefits, and alternatives have been provided, discussed and informed consent has been obtained with: patient.

## 2020-06-02 NOTE — ANESTHESIA PROCEDURE NOTES
Airway  Urgency: elective    Date/Time: 6/2/2020 7:43 AM  Airway not difficult    General Information and Staff    Patient location during procedure: OR  Anesthesiologist: Nabila Pettit MD  CRNA: Noemi Ribeiro CRNA    Indications and Patient Condition  Indications for airway management: airway protection    Preoxygenated: yes  MILS not maintained throughout  Mask difficulty assessment: 1 - vent by mask    Final Airway Details  Final airway type: endotracheal airway      Successful airway: ETT  Cuffed: yes   Successful intubation technique: direct laryngoscopy  Facilitating devices/methods: intubating stylet  Endotracheal tube insertion site: oral  Blade: Demetrice  Blade size: 3  ETT size (mm): 7.0  Cormack-Lehane Classification: grade I - full view of glottis  Placement verified by: chest auscultation   Cuff volume (mL): 8  Measured from: lips  Number of attempts at approach: 1  Assessment: lips, teeth, and gum same as pre-op and atraumatic intubation    Additional Comments  PreO2 100% face mask, IV induction, easy mask, DVL x1, cords noted, tube through, cuff up, EBBSH, +etCO2, = chest movement, tube secured in place, atraumatic, teeth and lips intact as preop.

## 2020-06-02 NOTE — ANESTHESIA POSTPROCEDURE EVALUATION
Patient: Geraldine Holloway    Procedure Summary     Date:  06/02/20 Room / Location:  [facility] OR [address] [place] [address] MAIN OR    Anesthesia Start:  0732 Anesthesia Stop:  1427    Procedures:       Laparoscopic uterosacral ligament colpopexy sacral colpopexy  Laparoscopic paravaginal repair Laparoscopic hysterectomy with bilateral salpingectomy Laparoscopic abdominal enterocele repair (N/A Abdomen)      Pubovaginal sling Posterior colporrhaphy  Insertion of vaginal grafts Cystourethroscopy (N/A Vagina) Diagnosis:       Uterovaginal prolapse      (Uterovaginal prolapse [N81.4])    Surgeon:  Geraldine Muñoz MD Provider:  Nabila Pettit MD    Anesthesia Type:  general ASA Status:  3          Anesthesia Type: general    Vitals  Vitals Value Taken Time   /80 6/2/2020  3:30 PM   Temp 36.7 °C (98.1 °F) 6/2/2020  2:21 PM   Pulse 84 6/2/2020  3:32 PM   Resp 16 6/2/2020  3:15 PM   SpO2 96 % 6/2/2020  3:32 PM   Vitals shown include unvalidated device data.        Post Anesthesia Care and Evaluation    Patient location during evaluation: PACU  Anesthetic complications: No anesthetic complications

## 2020-06-03 VITALS
WEIGHT: 202.6 LBS | BODY MASS INDEX: 34.59 KG/M2 | DIASTOLIC BLOOD PRESSURE: 60 MMHG | HEART RATE: 89 BPM | RESPIRATION RATE: 16 BRPM | SYSTOLIC BLOOD PRESSURE: 112 MMHG | OXYGEN SATURATION: 96 % | TEMPERATURE: 97.8 F | HEIGHT: 64 IN

## 2020-06-03 LAB
HCT VFR BLD AUTO: 27.3 % (ref 34–46.6)
HGB BLD-MCNC: 9.5 G/DL (ref 12–15.9)
LAB AP CASE REPORT: NORMAL
LAB AP CLINICAL INFORMATION: NORMAL
PATH REPORT.FINAL DX SPEC: NORMAL
PATH REPORT.GROSS SPEC: NORMAL

## 2020-06-03 PROCEDURE — 85014 HEMATOCRIT: CPT | Performed by: OBSTETRICS & GYNECOLOGY

## 2020-06-03 PROCEDURE — 85018 HEMOGLOBIN: CPT | Performed by: OBSTETRICS & GYNECOLOGY

## 2020-06-03 PROCEDURE — 25010000002 ENOXAPARIN PER 10 MG: Performed by: OBSTETRICS & GYNECOLOGY

## 2020-06-03 PROCEDURE — 25010000003 CEFAZOLIN IN DEXTROSE 2-4 GM/100ML-% SOLUTION: Performed by: OBSTETRICS & GYNECOLOGY

## 2020-06-03 RX ADMIN — HYDROCHLOROTHIAZIDE 12.5 MG: 12.5 CAPSULE ORAL at 09:05

## 2020-06-03 RX ADMIN — CLONAZEPAM 0.5 MG: 0.5 TABLET ORAL at 09:05

## 2020-06-03 RX ADMIN — OXYCODONE HYDROCHLORIDE AND ACETAMINOPHEN 1 TABLET: 10; 325 TABLET ORAL at 13:36

## 2020-06-03 RX ADMIN — CLONAZEPAM 0.5 MG: 0.5 TABLET ORAL at 18:00

## 2020-06-03 RX ADMIN — DEXTROSE AND SODIUM CHLORIDE 100 ML/HR: 5; 450 INJECTION, SOLUTION INTRAVENOUS at 06:23

## 2020-06-03 RX ADMIN — LEVETIRACETAM 1500 MG: 500 TABLET, FILM COATED ORAL at 09:05

## 2020-06-03 RX ADMIN — IBUPROFEN 400 MG: 400 TABLET, FILM COATED ORAL at 09:04

## 2020-06-03 RX ADMIN — IBUPROFEN 400 MG: 400 TABLET, FILM COATED ORAL at 00:57

## 2020-06-03 RX ADMIN — IBUPROFEN 400 MG: 400 TABLET, FILM COATED ORAL at 13:36

## 2020-06-03 RX ADMIN — LOSARTAN POTASSIUM 100 MG: 100 TABLET, FILM COATED ORAL at 09:05

## 2020-06-03 RX ADMIN — IBUPROFEN 400 MG: 400 TABLET, FILM COATED ORAL at 18:00

## 2020-06-03 RX ADMIN — LEVOTHYROXINE SODIUM 88 MCG: 88 TABLET ORAL at 05:04

## 2020-06-03 RX ADMIN — CEFAZOLIN SODIUM 2 G: 2 INJECTION, SOLUTION INTRAVENOUS at 05:02

## 2020-06-03 RX ADMIN — LAMOTRIGINE 100 MG: 100 TABLET ORAL at 09:05

## 2020-06-03 RX ADMIN — OXYCODONE HYDROCHLORIDE AND ACETAMINOPHEN 1 TABLET: 5; 325 TABLET ORAL at 09:51

## 2020-06-03 RX ADMIN — ENOXAPARIN SODIUM 40 MG: 40 INJECTION SUBCUTANEOUS at 09:05

## 2020-06-03 RX ADMIN — IBUPROFEN 400 MG: 400 TABLET, FILM COATED ORAL at 05:02

## 2020-06-03 RX ADMIN — SERTRALINE HYDROCHLORIDE 50 MG: 50 TABLET, FILM COATED ORAL at 05:04

## 2020-06-26 ENCOUNTER — HOSPITAL ENCOUNTER (EMERGENCY)
Facility: HOSPITAL | Age: 70
Discharge: HOME | End: 2020-06-27
Attending: EMERGENCY MEDICINE | Admitting: EMERGENCY MEDICINE

## 2020-06-26 DIAGNOSIS — T14.90XD HEALING WOUND: ICD-10-CM

## 2020-06-26 DIAGNOSIS — T81.31XA BROKEN SUTURE, INITIAL ENCOUNTER: Primary | ICD-10-CM

## 2020-06-26 LAB
BASOPHILS # BLD AUTO: 0.05 10*3/MM3 (ref 0–0.2)
BASOPHILS NFR BLD AUTO: 1.1 % (ref 0–1.5)
DEPRECATED RDW RBC AUTO: 42.9 FL (ref 37–54)
EOSINOPHIL # BLD AUTO: 0.23 10*3/MM3 (ref 0–0.4)
EOSINOPHIL NFR BLD AUTO: 5 % (ref 0.3–6.2)
ERYTHROCYTE [DISTWIDTH] IN BLOOD BY AUTOMATED COUNT: 13.2 % (ref 12.3–15.4)
HCT VFR BLD AUTO: 32.2 % (ref 34–46.6)
HGB BLD-MCNC: 10.9 G/DL (ref 12–15.9)
IMM GRANULOCYTES # BLD AUTO: 0.01 10*3/MM3 (ref 0–0.05)
IMM GRANULOCYTES NFR BLD AUTO: 0.2 % (ref 0–0.5)
LYMPHOCYTES # BLD AUTO: 1.74 10*3/MM3 (ref 0.7–3.1)
LYMPHOCYTES NFR BLD AUTO: 37.9 % (ref 19.6–45.3)
MCH RBC QN AUTO: 30.8 PG (ref 26.6–33)
MCHC RBC AUTO-ENTMCNC: 33.9 G/DL (ref 31.5–35.7)
MCV RBC AUTO: 91 FL (ref 79–97)
MONOCYTES # BLD AUTO: 0.26 10*3/MM3 (ref 0.1–0.9)
MONOCYTES NFR BLD AUTO: 5.7 % (ref 5–12)
NEUTROPHILS # BLD AUTO: 2.3 10*3/MM3 (ref 1.7–7)
NEUTROPHILS NFR BLD AUTO: 50.1 % (ref 42.7–76)
NRBC BLD AUTO-RTO: 0 /100 WBC (ref 0–0.2)
PLATELET # BLD AUTO: 188 10*3/MM3 (ref 140–450)
PMV BLD AUTO: 8.9 FL (ref 6–12)
RBC # BLD AUTO: 3.54 10*6/MM3 (ref 3.77–5.28)
WBC NRBC COR # BLD: 4.59 10*3/MM3 (ref 3.4–10.8)

## 2020-06-26 PROCEDURE — 36415 COLL VENOUS BLD VENIPUNCTURE: CPT

## 2020-06-26 PROCEDURE — 99282 EMERGENCY DEPT VISIT SF MDM: CPT

## 2020-06-26 PROCEDURE — 85025 COMPLETE CBC W/AUTO DIFF WBC: CPT | Performed by: NURSE PRACTITIONER

## 2020-06-27 VITALS
HEART RATE: 61 BPM | DIASTOLIC BLOOD PRESSURE: 79 MMHG | OXYGEN SATURATION: 97 % | SYSTOLIC BLOOD PRESSURE: 157 MMHG | HEIGHT: 64 IN | BODY MASS INDEX: 34.78 KG/M2 | RESPIRATION RATE: 16 BRPM | TEMPERATURE: 98.7 F

## 2022-04-26 ENCOUNTER — HOSPITAL ENCOUNTER (OUTPATIENT)
Dept: GENERAL RADIOLOGY | Facility: HOSPITAL | Age: 72
Discharge: HOME | End: 2022-04-26

## 2022-04-26 ENCOUNTER — PRE-ADMISSION TESTING (OUTPATIENT)
Dept: PREADMISSION TESTING | Facility: HOSPITAL | Age: 72
End: 2022-04-26

## 2022-04-26 VITALS
WEIGHT: 213 LBS | DIASTOLIC BLOOD PRESSURE: 79 MMHG | RESPIRATION RATE: 20 BRPM | SYSTOLIC BLOOD PRESSURE: 141 MMHG | BODY MASS INDEX: 37.74 KG/M2 | HEART RATE: 58 BPM | HEIGHT: 63 IN | TEMPERATURE: 97.3 F | OXYGEN SATURATION: 97 %

## 2022-04-26 LAB
ALBUMIN SERPL-MCNC: 4.8 G/DL (ref 3.5–5.2)
ALBUMIN/GLOB SERPL: 2.1 G/DL
ALP SERPL-CCNC: 110 U/L (ref 39–117)
ALT SERPL W P-5'-P-CCNC: 16 U/L (ref 1–33)
ANION GAP SERPL CALCULATED.3IONS-SCNC: 14 MMOL/L (ref 5–15)
AST SERPL-CCNC: 16 U/L (ref 1–32)
BACTERIA UR QL AUTO: ABNORMAL /HPF
BILIRUB SERPL-MCNC: 0.5 MG/DL (ref 0–1.2)
BILIRUB UR QL STRIP: NEGATIVE
BUN SERPL-MCNC: 19 MG/DL (ref 8–23)
BUN/CREAT SERPL: 17.4 (ref 7–25)
CALCIUM SPEC-SCNC: 9.7 MG/DL (ref 8.6–10.5)
CHLORIDE SERPL-SCNC: 101 MMOL/L (ref 98–107)
CLARITY UR: CLEAR
CO2 SERPL-SCNC: 27 MMOL/L (ref 22–29)
COLOR UR: ABNORMAL
CREAT SERPL-MCNC: 1.09 MG/DL (ref 0.57–1)
DEPRECATED RDW RBC AUTO: 43.3 FL (ref 37–54)
EGFRCR SERPLBLD CKD-EPI 2021: 54.1 ML/MIN/1.73
ERYTHROCYTE [DISTWIDTH] IN BLOOD BY AUTOMATED COUNT: 13 % (ref 12.3–15.4)
GLOBULIN UR ELPH-MCNC: 2.3 GM/DL
GLUCOSE SERPL-MCNC: 93 MG/DL (ref 65–99)
GLUCOSE UR STRIP-MCNC: NEGATIVE MG/DL
HCT VFR BLD AUTO: 41.4 % (ref 34–46.6)
HGB BLD-MCNC: 14.3 G/DL (ref 12–15.9)
HGB UR QL STRIP.AUTO: NEGATIVE
HYALINE CASTS UR QL AUTO: ABNORMAL /LPF
INR PPP: 0.98 (ref 0.9–1.1)
KETONES UR QL STRIP: ABNORMAL
LEUKOCYTE ESTERASE UR QL STRIP.AUTO: ABNORMAL
MCH RBC QN AUTO: 31.8 PG (ref 26.6–33)
MCHC RBC AUTO-ENTMCNC: 34.5 G/DL (ref 31.5–35.7)
MCV RBC AUTO: 92 FL (ref 79–97)
NITRITE UR QL STRIP: NEGATIVE
PH UR STRIP.AUTO: 6 [PH] (ref 5–8)
PLATELET # BLD AUTO: 249 10*3/MM3 (ref 140–450)
PMV BLD AUTO: 9.1 FL (ref 6–12)
POTASSIUM SERPL-SCNC: 3.9 MMOL/L (ref 3.5–5.2)
PROT SERPL-MCNC: 7.1 G/DL (ref 6–8.5)
PROT UR QL STRIP: NEGATIVE
PROTHROMBIN TIME: 12.9 SECONDS (ref 11.7–14.2)
QT INTERVAL: 451 MS
RBC # BLD AUTO: 4.5 10*6/MM3 (ref 3.77–5.28)
RBC # UR STRIP: ABNORMAL /HPF
REF LAB TEST METHOD: ABNORMAL
SODIUM SERPL-SCNC: 142 MMOL/L (ref 136–145)
SP GR UR STRIP: 1.02 (ref 1–1.03)
SQUAMOUS #/AREA URNS HPF: ABNORMAL /HPF
UROBILINOGEN UR QL STRIP: ABNORMAL
WBC # UR STRIP: ABNORMAL /HPF
WBC NRBC COR # BLD: 7.37 10*3/MM3 (ref 3.4–10.8)

## 2022-04-26 PROCEDURE — 85610 PROTHROMBIN TIME: CPT

## 2022-04-26 PROCEDURE — 36415 COLL VENOUS BLD VENIPUNCTURE: CPT

## 2022-04-26 PROCEDURE — 71046 X-RAY EXAM CHEST 2 VIEWS: CPT

## 2022-04-26 PROCEDURE — 81001 URINALYSIS AUTO W/SCOPE: CPT

## 2022-04-26 PROCEDURE — 80053 COMPREHEN METABOLIC PANEL: CPT

## 2022-04-26 PROCEDURE — 85027 COMPLETE CBC AUTOMATED: CPT

## 2022-04-26 PROCEDURE — 93010 ELECTROCARDIOGRAM REPORT: CPT | Performed by: INTERNAL MEDICINE

## 2022-04-26 PROCEDURE — 93005 ELECTROCARDIOGRAM TRACING: CPT

## 2022-04-26 PROCEDURE — 73560 X-RAY EXAM OF KNEE 1 OR 2: CPT

## 2022-04-26 RX ORDER — LEVOTHYROXINE SODIUM 112 UG/1
112 TABLET ORAL
COMMUNITY

## 2022-04-26 RX ORDER — CHLORHEXIDINE GLUCONATE 500 MG/1
1 CLOTH TOPICAL
COMMUNITY
End: 2022-05-11 | Stop reason: HOSPADM

## 2022-04-26 RX ORDER — AZELASTINE 1 MG/ML
2 SPRAY, METERED NASAL 2 TIMES DAILY
COMMUNITY
End: 2023-03-27

## 2022-04-26 RX ORDER — ROSUVASTATIN CALCIUM 10 MG/1
10 TABLET, COATED ORAL NIGHTLY
COMMUNITY

## 2022-04-26 ASSESSMENT — KOOS JR
KOOS JR SCORE: 19
KOOS JR SCORE: 39.625

## 2022-04-26 NOTE — DISCHARGE INSTRUCTIONS
Take the following medications the morning of surgery:    Zoloft   keppra   lamictal  clonazepam  levothyroxine    If you are on prescription narcotic pain medication to control your pain you may also take that medication the morning of surgery.    General Instructions:  Do not eat solid food after midnight the night before surgery.  You may drink clear liquids day of surgery but must stop at least one hour before your hospital arrival time.  It is beneficial for you to have a clear drink that contains carbohydrates the day of surgery.  We suggest a 12 to 20 ounce bottle of Gatorade or Powerade for non-diabetic patients or a 12 to 20 ounce bottle of G2 or Powerade Zero for diabetic patients. (Pediatric patients, are not advised to drink a 12 to 20 ounce carbohydrate drink)    Clear liquids are liquids you can see through.  Nothing red in color.     Plain water                               Sports drinks  Sodas                                   Gelatin (Jell-O)  Fruit juices without pulp such as white grape juice and apple juice  Popsicles that contain no fruit or yogurt  Tea or coffee (no cream or milk added)  Gatorade / Powerade  G2 / Powerade Zero    Infants may have breast milk up to four hours before surgery.  Infants drinking formula may drink formula up to six hours before surgery.   Patients who avoid smoking, chewing tobacco and alcohol for 4 weeks prior to surgery have a reduced risk of post-operative complications.  Quit smoking as many days before surgery as you can.  Do not smoke, use chewing tobacco or drink alcohol the day of surgery.   If applicable bring your C-PAP/ BI-PAP machine.  Bring any papers given to you in the doctor’s office.  Wear clean comfortable clothes.  Do not wear contact lenses, false eyelashes or make-up.  Bring a case for your glasses.   Bring crutches or walker if applicable.  Remove all piercings.  Leave jewelry and any other valuables at home.  Hair extensions with metal clips  must be removed prior to surgery.  The Pre-Admission Testing nurse will instruct you to bring medications if unable to obtain an accurate list in Pre-Admission Testing.        If you were given a blood bank ID arm band remember to bring it with you the day of surgery.    Preventing a Surgical Site Infection:  For 2 to 3 days before surgery, avoid shaving with a razor because the razor can irritate skin and make it easier to develop an infection.    Any areas of open skin can increase the risk of a post-operative wound infection by allowing bacteria to enter and travel throughout the body.  Notify your surgeon if you have any skin wounds / rashes even if it is not near the expected surgical site.  The area will need assessed to determine if surgery should be delayed until it is healed.  The night prior to surgery shower using a fresh bar of anti-bacterial soap (such as Dial) and clean washcloth.  Sleep in a clean bed with clean clothing.  Do not allow pets to sleep with you.  Shower on the morning of surgery using a fresh bar of anti-bacterial soap (such as Dial) and clean washcloth.  Dry with a clean towel and dress in clean clothing.  Ask your surgeon if you will be receiving antibiotics prior to surgery.  Make sure you, your family, and all healthcare providers clean their hands with soap and water or an alcohol based hand  before caring for you or your wound.    Day of surgery:5/10/2022   0900  Your arrival time is approximately two hours before your scheduled surgery time.  Upon arrival, a Pre-op nurse and Anesthesiologist will review your health history, obtain vital signs, and answer questions you may have.  The only belongings needed at this time will be a list of your home medications and if applicable your C-PAP/BI-PAP machine.  A Pre-op nurse will start an IV and you may receive medication in preparation for surgery, including something to help you relax.     Please be aware that surgery does come  with discomfort.  We want to make every effort to control your discomfort so please discuss any uncontrolled symptoms with your nurse.   Your doctor will most likely have prescribed pain medications.      If you are going home after surgery you will receive individualized written care instructions before being discharged.  A responsible adult must drive you to and from the hospital on the day of your surgery and stay with you for 24 hours.  Discharge prescriptions can be filled by the hospital pharmacy during regular pharmacy hours.  If you are having surgery late in the day/evening your prescription may be e-prescribed to your pharmacy.  Please verify your pharmacy hours or chose a 24 hour pharmacy to avoid not having access to your prescription because your pharmacy has closed for the day.    If you are staying overnight following surgery, you will be transported to your hospital room following the recovery period.  [facility] has all private rooms.    If you have any questions please call Pre-Admission Testing at [phone].  Deductibles and co-payments are collected on the day of service. Please be prepared to pay the required co-pay, deductible or deposit on the day of service as defined by your plan.    Patient Education for Self-Quarantine Process    Following your COVID testing, we strongly recommend that you wear a mask when you are with other people and practice social distancing.   Limit your activities to only required outings.  Wash your hands with soap and water frequently for at least 20 seconds.   Avoid touching your eyes, nose and mouth with unwashed hands.  Do not share anything - utensils, drinking glasses, food from the same bowl.   Sanitize household surfaces daily. Include all high touch areas (door handles, light switches, phones, countertops, etc.)    Call your surgeon immediately if you experience any of the following symptoms:  Sore Throat  Shortness of Breath or  difficulty breathing  Cough  Chills  Body soreness or muscle pain  Headache  Fever  New loss of taste or smell  Do not arrive for your surgery ill.  Your procedure will need to be rescheduled to another time.  You will need to call your physician before the day of surgery to avoid any unnecessary exposure to hospital staff as well as other patients.   CHLORHEXIDINE CLOTH INSTRUCTIONS  The morning of surgery follow these instructions using the Chlorhexidine cloths you've been given.  These steps reduce bacteria on the body.  Do not use the cloths near your eyes, ears mouth, genitalia or on open wounds.  Throw the cloths away after use but do not try to flush them down a toilet.      Open and remove one cloth at a time from the package.    Leave the cloth unfolded and begin the bathing.  Massage the skin with the cloths using gentle pressure to remove bacteria.  Do not scrub harshly.   Follow the steps below with one 2% CHG cloth per area (6 total cloths).  One cloth for neck, shoulders and chest.  One cloth for both arms, hands, fingers and underarms (do underarms last).  One cloth for the abdomen followed by groin.  One cloth for right leg and foot including between the toes.  One cloth for left leg and foot including between the toes.  The last cloth is to be used for the back of the neck, back and buttocks.    Allow the CHG to air dry 3 minutes on the skin which will give it time to work and decrease the chance of irritation.  The skin may feel sticky until it is dry.  Do not rinse with water or any other liquid or you will lose the beneficial effects of the CHG.  If mild skin irritation occurs, do rinse the skin to remove the CHG.  Report this to the nurse at time of admission.  Do not apply lotions, creams, ointments, deodorants or perfumes after using the clothes. Dress in clean clothes before coming to the hospital.    BACTROBAN NASAL OINTMENT  There are many germs normally in your nose. Bactroban is an  ointment that will help reduce these germs. Please follow these instructions for Bactroban use:      __1__The day before surgery in the morning  Date5/9/2022_______    __2__The day before surgery in the evening              Date_5/9/2022_______    _3___The day of surgery in the morning    Date__5/10/2022______    **Squirt ½ package of Bactroban Ointment onto a cotton applicator and apply to inside of 1st nostril.  Squirt the remaining Bactroban and apply to the inside of the other nostril.    PERIDEX- ORAL:  Use only if your surgeon has ordered  Use the night before and morning of surgery - Swish, gargle, and spit - do not swallow.

## 2022-05-07 ENCOUNTER — LAB (OUTPATIENT)
Dept: LAB | Facility: HOSPITAL | Age: 72
End: 2022-05-07

## 2022-05-07 LAB — SARS-COV-2 ORF1AB RESP QL NAA+PROBE: NOT DETECTED

## 2022-05-07 PROCEDURE — C9803 HOPD COVID-19 SPEC COLLECT: HCPCS

## 2022-05-07 PROCEDURE — U0004 COV-19 TEST NON-CDC HGH THRU: HCPCS

## 2022-05-10 ENCOUNTER — ANESTHESIA (OUTPATIENT)
Dept: PERIOP | Facility: HOSPITAL | Age: 72
End: 2022-05-10

## 2022-05-10 ENCOUNTER — ANESTHESIA EVENT (OUTPATIENT)
Dept: PERIOP | Facility: HOSPITAL | Age: 72
End: 2022-05-10

## 2022-05-10 ENCOUNTER — HOSPITAL ENCOUNTER (OUTPATIENT)
Facility: HOSPITAL | Age: 72
Setting detail: OBSERVATION
Discharge: HOME | End: 2022-05-11
Attending: ORTHOPAEDIC SURGERY | Admitting: ORTHOPAEDIC SURGERY

## 2022-05-10 ENCOUNTER — APPOINTMENT (OUTPATIENT)
Dept: GENERAL RADIOLOGY | Facility: HOSPITAL | Age: 72
End: 2022-05-10

## 2022-05-10 DIAGNOSIS — M17.11 PRIMARY OSTEOARTHRITIS OF RIGHT KNEE: Primary | ICD-10-CM

## 2022-05-10 DIAGNOSIS — N81.12 CYSTOCELE, LATERAL: ICD-10-CM

## 2022-05-10 PROCEDURE — 97161 PT EVAL LOW COMPLEX 20 MIN: CPT

## 2022-05-10 PROCEDURE — G0378 HOSPITAL OBSERVATION PER HR: HCPCS

## 2022-05-10 PROCEDURE — 25010000002 FENTANYL CITRATE (PF) 50 MCG/ML SOLUTION: Performed by: NURSE ANESTHETIST, CERTIFIED REGISTERED

## 2022-05-10 PROCEDURE — 25010000002 DEXAMETHASONE PER 1 MG: Performed by: NURSE ANESTHETIST, CERTIFIED REGISTERED

## 2022-05-10 PROCEDURE — 25010000002 ONDANSETRON PER 1 MG: Performed by: NURSE ANESTHETIST, CERTIFIED REGISTERED

## 2022-05-10 PROCEDURE — 25010000002 MORPHINE PER 10 MG: Performed by: ORTHOPAEDIC SURGERY

## 2022-05-10 PROCEDURE — 25010000002 CEFAZOLIN IN DEXTROSE 2-4 GM/100ML-% SOLUTION: Performed by: ORTHOPAEDIC SURGERY

## 2022-05-10 PROCEDURE — 25010000002 CEFAZOLIN PER 500 MG: Performed by: ORTHOPAEDIC SURGERY

## 2022-05-10 PROCEDURE — C1776 JOINT DEVICE (IMPLANTABLE): HCPCS | Performed by: ORTHOPAEDIC SURGERY

## 2022-05-10 PROCEDURE — 25010000002 ROPIVACAINE PER 1 MG: Performed by: ANESTHESIOLOGY

## 2022-05-10 PROCEDURE — 25010000002 KETOROLAC TROMETHAMINE PER 15 MG: Performed by: ORTHOPAEDIC SURGERY

## 2022-05-10 PROCEDURE — 25010000002 PROPOFOL 10 MG/ML EMULSION: Performed by: NURSE ANESTHETIST, CERTIFIED REGISTERED

## 2022-05-10 PROCEDURE — 73560 X-RAY EXAM OF KNEE 1 OR 2: CPT

## 2022-05-10 PROCEDURE — 25010000002 NEOSTIGMINE 5 MG/10ML SOLUTION: Performed by: NURSE ANESTHETIST, CERTIFIED REGISTERED

## 2022-05-10 PROCEDURE — C1713 ANCHOR/SCREW BN/BN,TIS/BN: HCPCS | Performed by: ORTHOPAEDIC SURGERY

## 2022-05-10 PROCEDURE — 25010000002 ROPIVACAINE PER 1 MG: Performed by: ORTHOPAEDIC SURGERY

## 2022-05-10 PROCEDURE — 76942 ECHO GUIDE FOR BIOPSY: CPT | Performed by: ORTHOPAEDIC SURGERY

## 2022-05-10 PROCEDURE — 0 MEPIVACAINE HCL (PF) 1.5 % SOLUTION: Performed by: ANESTHESIOLOGY

## 2022-05-10 PROCEDURE — 25010000002 EPINEPHRINE 1 MG/ML SOLUTION 30 ML VIAL: Performed by: ORTHOPAEDIC SURGERY

## 2022-05-10 PROCEDURE — 97110 THERAPEUTIC EXERCISES: CPT

## 2022-05-10 PROCEDURE — 25010000002 HYDROMORPHONE PER 4 MG: Performed by: NURSE ANESTHETIST, CERTIFIED REGISTERED

## 2022-05-10 DEVICE — IMPLANTABLE DEVICE
Type: IMPLANTABLE DEVICE | Site: KNEE | Status: FUNCTIONAL
Brand: VANGUARD® KNEE SYSTEM

## 2022-05-10 DEVICE — IMPLANTABLE DEVICE
Type: IMPLANTABLE DEVICE | Site: KNEE | Status: FUNCTIONAL
Brand: BIOMET KNEE SYSTEM

## 2022-05-10 DEVICE — CAP TOTL KN CMT PRIMARY: Type: IMPLANTABLE DEVICE | Site: KNEE | Status: FUNCTIONAL

## 2022-05-10 DEVICE — IMPLANTABLE DEVICE
Type: IMPLANTABLE DEVICE | Site: KNEE | Status: FUNCTIONAL
Brand: BIOMET® KNEE SYSTEM

## 2022-05-10 RX ORDER — SODIUM CHLORIDE 0.9 % (FLUSH) 0.9 %
3-10 SYRINGE (ML) INJECTION AS NEEDED
Status: DISCONTINUED | OUTPATIENT
Start: 2022-05-10 | End: 2022-05-10 | Stop reason: HOSPADM

## 2022-05-10 RX ORDER — AZELASTINE 1 MG/ML
2 SPRAY, METERED NASAL 2 TIMES DAILY
Status: DISCONTINUED | OUTPATIENT
Start: 2022-05-10 | End: 2022-05-11 | Stop reason: HOSPADM

## 2022-05-10 RX ORDER — PROMETHAZINE HYDROCHLORIDE 25 MG/1
25 SUPPOSITORY RECTAL ONCE AS NEEDED
Status: DISCONTINUED | OUTPATIENT
Start: 2022-05-10 | End: 2022-05-10 | Stop reason: HOSPADM

## 2022-05-10 RX ORDER — FENTANYL CITRATE 50 UG/ML
INJECTION, SOLUTION INTRAMUSCULAR; INTRAVENOUS AS NEEDED
Status: DISCONTINUED | OUTPATIENT
Start: 2022-05-10 | End: 2022-05-10 | Stop reason: SURG

## 2022-05-10 RX ORDER — MORPHINE SULFATE 2 MG/ML
4 INJECTION, SOLUTION INTRAMUSCULAR; INTRAVENOUS
Status: DISCONTINUED | OUTPATIENT
Start: 2022-05-10 | End: 2022-05-11 | Stop reason: HOSPADM

## 2022-05-10 RX ORDER — HYDROMORPHONE HYDROCHLORIDE 1 MG/ML
0.5 INJECTION, SOLUTION INTRAMUSCULAR; INTRAVENOUS; SUBCUTANEOUS
Status: DISCONTINUED | OUTPATIENT
Start: 2022-05-10 | End: 2022-05-10 | Stop reason: HOSPADM

## 2022-05-10 RX ORDER — LIDOCAINE HYDROCHLORIDE 20 MG/ML
INJECTION, SOLUTION INFILTRATION; PERINEURAL AS NEEDED
Status: DISCONTINUED | OUTPATIENT
Start: 2022-05-10 | End: 2022-05-10 | Stop reason: SURG

## 2022-05-10 RX ORDER — ATENOLOL 50 MG/1
50 TABLET ORAL NIGHTLY
Status: DISCONTINUED | OUTPATIENT
Start: 2022-05-10 | End: 2022-05-11 | Stop reason: HOSPADM

## 2022-05-10 RX ORDER — NALOXONE HCL 0.4 MG/ML
0.4 VIAL (ML) INJECTION
Status: DISCONTINUED | OUTPATIENT
Start: 2022-05-10 | End: 2022-05-11 | Stop reason: HOSPADM

## 2022-05-10 RX ORDER — HYDRALAZINE HYDROCHLORIDE 20 MG/ML
5 INJECTION INTRAMUSCULAR; INTRAVENOUS
Status: DISCONTINUED | OUTPATIENT
Start: 2022-05-10 | End: 2022-05-10 | Stop reason: HOSPADM

## 2022-05-10 RX ORDER — FERROUS SULFATE 325(65) MG
325 TABLET ORAL
Status: DISCONTINUED | OUTPATIENT
Start: 2022-05-10 | End: 2022-05-11 | Stop reason: HOSPADM

## 2022-05-10 RX ORDER — PROMETHAZINE HYDROCHLORIDE 25 MG/1
25 TABLET ORAL ONCE AS NEEDED
Status: DISCONTINUED | OUTPATIENT
Start: 2022-05-10 | End: 2022-05-10 | Stop reason: HOSPADM

## 2022-05-10 RX ORDER — LIDOCAINE HYDROCHLORIDE 10 MG/ML
0.5 INJECTION, SOLUTION EPIDURAL; INFILTRATION; INTRACAUDAL; PERINEURAL ONCE AS NEEDED
Status: DISCONTINUED | OUTPATIENT
Start: 2022-05-10 | End: 2022-05-10 | Stop reason: HOSPADM

## 2022-05-10 RX ORDER — CELECOXIB 200 MG/1
200 CAPSULE ORAL ONCE
Status: COMPLETED | OUTPATIENT
Start: 2022-05-10 | End: 2022-05-10

## 2022-05-10 RX ORDER — CLINDAMYCIN PHOSPHATE 900 MG/50ML
900 INJECTION INTRAVENOUS EVERY 8 HOURS
Status: COMPLETED | OUTPATIENT
Start: 2022-05-10 | End: 2022-05-10

## 2022-05-10 RX ORDER — ACETAMINOPHEN 500 MG
1000 TABLET ORAL ONCE
Status: COMPLETED | OUTPATIENT
Start: 2022-05-10 | End: 2022-05-10

## 2022-05-10 RX ORDER — ONDANSETRON 4 MG/1
4 TABLET, FILM COATED ORAL EVERY 6 HOURS PRN
Status: DISCONTINUED | OUTPATIENT
Start: 2022-05-10 | End: 2022-05-11 | Stop reason: HOSPADM

## 2022-05-10 RX ORDER — FENTANYL CITRATE 50 UG/ML
50 INJECTION, SOLUTION INTRAMUSCULAR; INTRAVENOUS
Status: DISCONTINUED | OUTPATIENT
Start: 2022-05-10 | End: 2022-05-10 | Stop reason: HOSPADM

## 2022-05-10 RX ORDER — DIPHENHYDRAMINE HYDROCHLORIDE 50 MG/ML
12.5 INJECTION INTRAMUSCULAR; INTRAVENOUS
Status: DISCONTINUED | OUTPATIENT
Start: 2022-05-10 | End: 2022-05-10 | Stop reason: HOSPADM

## 2022-05-10 RX ORDER — TRANEXAMIC ACID 100 MG/ML
INJECTION, SOLUTION INTRAVENOUS AS NEEDED
Status: DISCONTINUED | OUTPATIENT
Start: 2022-05-10 | End: 2022-05-10 | Stop reason: HOSPADM

## 2022-05-10 RX ORDER — MIDAZOLAM HYDROCHLORIDE 1 MG/ML
0.5 INJECTION INTRAMUSCULAR; INTRAVENOUS
Status: DISCONTINUED | OUTPATIENT
Start: 2022-05-10 | End: 2022-05-10

## 2022-05-10 RX ORDER — DIPHENHYDRAMINE HCL 25 MG
25 CAPSULE ORAL
Status: DISCONTINUED | OUTPATIENT
Start: 2022-05-10 | End: 2022-05-10 | Stop reason: HOSPADM

## 2022-05-10 RX ORDER — FLUMAZENIL 0.1 MG/ML
0.2 INJECTION INTRAVENOUS AS NEEDED
Status: DISCONTINUED | OUTPATIENT
Start: 2022-05-10 | End: 2022-05-10 | Stop reason: HOSPADM

## 2022-05-10 RX ORDER — GLYCOPYRROLATE 0.2 MG/ML
INJECTION INTRAMUSCULAR; INTRAVENOUS AS NEEDED
Status: DISCONTINUED | OUTPATIENT
Start: 2022-05-10 | End: 2022-05-10 | Stop reason: SURG

## 2022-05-10 RX ORDER — ASPIRIN 325 MG
325 TABLET, DELAYED RELEASE (ENTERIC COATED) ORAL 2 TIMES DAILY WITH MEALS
Qty: 60 TABLET | Refills: 0 | Status: SHIPPED | OUTPATIENT
Start: 2022-05-11

## 2022-05-10 RX ORDER — LEVETIRACETAM 500 MG/1
1500 TABLET ORAL 2 TIMES DAILY
Status: DISCONTINUED | OUTPATIENT
Start: 2022-05-10 | End: 2022-05-11 | Stop reason: HOSPADM

## 2022-05-10 RX ORDER — ONDANSETRON 2 MG/ML
INJECTION INTRAMUSCULAR; INTRAVENOUS AS NEEDED
Status: DISCONTINUED | OUTPATIENT
Start: 2022-05-10 | End: 2022-05-10 | Stop reason: SURG

## 2022-05-10 RX ORDER — HYDROMORPHONE HCL 110MG/55ML
PATIENT CONTROLLED ANALGESIA SYRINGE INTRAVENOUS AS NEEDED
Status: DISCONTINUED | OUTPATIENT
Start: 2022-05-10 | End: 2022-05-10 | Stop reason: SURG

## 2022-05-10 RX ORDER — DOCUSATE SODIUM 250 MG
250 CAPSULE ORAL 2 TIMES DAILY PRN
Qty: 30 CAPSULE | Refills: 1 | Status: SHIPPED | OUTPATIENT
Start: 2022-05-10

## 2022-05-10 RX ORDER — HYDROCODONE BITARTRATE AND ACETAMINOPHEN 7.5; 325 MG/1; MG/1
1 TABLET ORAL ONCE AS NEEDED
Status: DISCONTINUED | OUTPATIENT
Start: 2022-05-10 | End: 2022-05-10 | Stop reason: HOSPADM

## 2022-05-10 RX ORDER — LABETALOL HYDROCHLORIDE 5 MG/ML
5 INJECTION, SOLUTION INTRAVENOUS
Status: DISCONTINUED | OUTPATIENT
Start: 2022-05-10 | End: 2022-05-10 | Stop reason: HOSPADM

## 2022-05-10 RX ORDER — ZONISAMIDE 100 MG/1
200 CAPSULE ORAL NIGHTLY
Status: DISCONTINUED | OUTPATIENT
Start: 2022-05-10 | End: 2022-05-11 | Stop reason: HOSPADM

## 2022-05-10 RX ORDER — MAGNESIUM HYDROXIDE 1200 MG/15ML
LIQUID ORAL AS NEEDED
Status: DISCONTINUED | OUTPATIENT
Start: 2022-05-10 | End: 2022-05-10 | Stop reason: HOSPADM

## 2022-05-10 RX ORDER — ROCURONIUM BROMIDE 10 MG/ML
INJECTION, SOLUTION INTRAVENOUS AS NEEDED
Status: DISCONTINUED | OUTPATIENT
Start: 2022-05-10 | End: 2022-05-10 | Stop reason: SURG

## 2022-05-10 RX ORDER — ROPIVACAINE HYDROCHLORIDE 2 MG/ML
INJECTION, SOLUTION EPIDURAL; INFILTRATION; PERINEURAL
Status: COMPLETED | OUTPATIENT
Start: 2022-05-10 | End: 2022-05-10

## 2022-05-10 RX ORDER — PROPOFOL 10 MG/ML
VIAL (ML) INTRAVENOUS AS NEEDED
Status: DISCONTINUED | OUTPATIENT
Start: 2022-05-10 | End: 2022-05-10 | Stop reason: SURG

## 2022-05-10 RX ORDER — CHOLECALCIFEROL (VITAMIN D3) 125 MCG
5 CAPSULE ORAL NIGHTLY PRN
Status: DISCONTINUED | OUTPATIENT
Start: 2022-05-10 | End: 2022-05-11 | Stop reason: HOSPADM

## 2022-05-10 RX ORDER — OXYCODONE HYDROCHLORIDE AND ACETAMINOPHEN 5; 325 MG/1; MG/1
2 TABLET ORAL EVERY 4 HOURS PRN
Status: DISCONTINUED | OUTPATIENT
Start: 2022-05-10 | End: 2022-05-11 | Stop reason: HOSPADM

## 2022-05-10 RX ORDER — OXYCODONE HYDROCHLORIDE AND ACETAMINOPHEN 5; 325 MG/1; MG/1
1-2 TABLET ORAL EVERY 4 HOURS PRN
Qty: 60 TABLET | Refills: 0 | Status: SHIPPED | OUTPATIENT
Start: 2022-05-10 | End: 2022-05-17 | Stop reason: HOSPADM

## 2022-05-10 RX ORDER — CEFAZOLIN SODIUM 2 G/100ML
2 INJECTION, SOLUTION INTRAVENOUS
Status: COMPLETED | OUTPATIENT
Start: 2022-05-10 | End: 2022-05-10

## 2022-05-10 RX ORDER — IBUPROFEN 600 MG/1
600 TABLET ORAL ONCE AS NEEDED
Status: DISCONTINUED | OUTPATIENT
Start: 2022-05-10 | End: 2022-05-10 | Stop reason: HOSPADM

## 2022-05-10 RX ORDER — ONDANSETRON 2 MG/ML
4 INJECTION INTRAMUSCULAR; INTRAVENOUS EVERY 6 HOURS PRN
Status: DISCONTINUED | OUTPATIENT
Start: 2022-05-10 | End: 2022-05-11 | Stop reason: HOSPADM

## 2022-05-10 RX ORDER — DIAZEPAM 5 MG/1
5 TABLET ORAL EVERY 6 HOURS PRN
Status: DISCONTINUED | OUTPATIENT
Start: 2022-05-10 | End: 2022-05-11 | Stop reason: HOSPADM

## 2022-05-10 RX ORDER — PROMETHAZINE HYDROCHLORIDE 12.5 MG/1
12.5 TABLET ORAL EVERY 6 HOURS PRN
Status: DISCONTINUED | OUTPATIENT
Start: 2022-05-10 | End: 2022-05-11 | Stop reason: HOSPADM

## 2022-05-10 RX ORDER — OXYCODONE AND ACETAMINOPHEN 7.5; 325 MG/1; MG/1
1 TABLET ORAL EVERY 4 HOURS PRN
Status: DISCONTINUED | OUTPATIENT
Start: 2022-05-10 | End: 2022-05-10 | Stop reason: HOSPADM

## 2022-05-10 RX ORDER — LEVOTHYROXINE SODIUM 112 UG/1
112 TABLET ORAL
Status: DISCONTINUED | OUTPATIENT
Start: 2022-05-11 | End: 2022-05-11 | Stop reason: HOSPADM

## 2022-05-10 RX ORDER — SODIUM CHLORIDE 0.9 % (FLUSH) 0.9 %
10 SYRINGE (ML) INJECTION EVERY 12 HOURS SCHEDULED
Status: DISCONTINUED | OUTPATIENT
Start: 2022-05-10 | End: 2022-05-11 | Stop reason: HOSPADM

## 2022-05-10 RX ORDER — SERTRALINE HYDROCHLORIDE 100 MG/1
100 TABLET, FILM COATED ORAL NIGHTLY
Status: DISCONTINUED | OUTPATIENT
Start: 2022-05-10 | End: 2022-05-11 | Stop reason: HOSPADM

## 2022-05-10 RX ORDER — FAMOTIDINE 10 MG/ML
20 INJECTION, SOLUTION INTRAVENOUS ONCE
Status: COMPLETED | OUTPATIENT
Start: 2022-05-10 | End: 2022-05-10

## 2022-05-10 RX ORDER — ONDANSETRON 2 MG/ML
4 INJECTION INTRAMUSCULAR; INTRAVENOUS ONCE AS NEEDED
Status: DISCONTINUED | OUTPATIENT
Start: 2022-05-10 | End: 2022-05-10 | Stop reason: HOSPADM

## 2022-05-10 RX ORDER — ACETAMINOPHEN 325 MG/1
325 TABLET ORAL EVERY 4 HOURS PRN
Status: DISCONTINUED | OUTPATIENT
Start: 2022-05-10 | End: 2022-05-11 | Stop reason: HOSPADM

## 2022-05-10 RX ORDER — CLONAZEPAM 0.5 MG/1
0.5 TABLET ORAL 3 TIMES DAILY
Status: DISCONTINUED | OUTPATIENT
Start: 2022-05-10 | End: 2022-05-11 | Stop reason: HOSPADM

## 2022-05-10 RX ORDER — DEXAMETHASONE SODIUM PHOSPHATE 10 MG/ML
INJECTION INTRAMUSCULAR; INTRAVENOUS AS NEEDED
Status: DISCONTINUED | OUTPATIENT
Start: 2022-05-10 | End: 2022-05-10 | Stop reason: SURG

## 2022-05-10 RX ORDER — ROSUVASTATIN CALCIUM 10 MG/1
10 TABLET, COATED ORAL NIGHTLY
Status: DISCONTINUED | OUTPATIENT
Start: 2022-05-10 | End: 2022-05-11 | Stop reason: HOSPADM

## 2022-05-10 RX ORDER — SODIUM CHLORIDE 450 MG/100ML
100 INJECTION, SOLUTION INTRAVENOUS CONTINUOUS
Status: DISCONTINUED | OUTPATIENT
Start: 2022-05-10 | End: 2022-05-11 | Stop reason: HOSPADM

## 2022-05-10 RX ORDER — NEOSTIGMINE METHYLSULFATE 0.5 MG/ML
INJECTION, SOLUTION INTRAVENOUS AS NEEDED
Status: DISCONTINUED | OUTPATIENT
Start: 2022-05-10 | End: 2022-05-10 | Stop reason: SURG

## 2022-05-10 RX ORDER — ASPIRIN 325 MG
325 TABLET, DELAYED RELEASE (ENTERIC COATED) ORAL 2 TIMES DAILY WITH MEALS
Status: DISCONTINUED | OUTPATIENT
Start: 2022-05-11 | End: 2022-05-11 | Stop reason: HOSPADM

## 2022-05-10 RX ORDER — NALOXONE HCL 0.4 MG/ML
0.2 VIAL (ML) INJECTION AS NEEDED
Status: DISCONTINUED | OUTPATIENT
Start: 2022-05-10 | End: 2022-05-10 | Stop reason: HOSPADM

## 2022-05-10 RX ORDER — SODIUM CHLORIDE, SODIUM LACTATE, POTASSIUM CHLORIDE, CALCIUM CHLORIDE 600; 310; 30; 20 MG/100ML; MG/100ML; MG/100ML; MG/100ML
9 INJECTION, SOLUTION INTRAVENOUS CONTINUOUS
Status: DISCONTINUED | OUTPATIENT
Start: 2022-05-10 | End: 2022-05-11 | Stop reason: HOSPADM

## 2022-05-10 RX ORDER — DOCUSATE SODIUM 100 MG/1
100 CAPSULE, LIQUID FILLED ORAL 2 TIMES DAILY PRN
Status: DISCONTINUED | OUTPATIENT
Start: 2022-05-10 | End: 2022-05-11 | Stop reason: HOSPADM

## 2022-05-10 RX ORDER — LAMOTRIGINE 100 MG/1
100 TABLET ORAL EVERY 12 HOURS SCHEDULED
Status: DISCONTINUED | OUTPATIENT
Start: 2022-05-10 | End: 2022-05-11 | Stop reason: HOSPADM

## 2022-05-10 RX ORDER — SODIUM CHLORIDE 0.9 % (FLUSH) 0.9 %
3 SYRINGE (ML) INJECTION EVERY 12 HOURS SCHEDULED
Status: DISCONTINUED | OUTPATIENT
Start: 2022-05-10 | End: 2022-05-10 | Stop reason: HOSPADM

## 2022-05-10 RX ORDER — SODIUM CHLORIDE 0.9 % (FLUSH) 0.9 %
1-10 SYRINGE (ML) INJECTION AS NEEDED
Status: DISCONTINUED | OUTPATIENT
Start: 2022-05-10 | End: 2022-05-11 | Stop reason: HOSPADM

## 2022-05-10 RX ORDER — EPHEDRINE SULFATE 50 MG/ML
5 INJECTION, SOLUTION INTRAVENOUS ONCE AS NEEDED
Status: DISCONTINUED | OUTPATIENT
Start: 2022-05-10 | End: 2022-05-10 | Stop reason: HOSPADM

## 2022-05-10 RX ORDER — OXYCODONE HYDROCHLORIDE AND ACETAMINOPHEN 5; 325 MG/1; MG/1
1 TABLET ORAL EVERY 4 HOURS PRN
Status: DISCONTINUED | OUTPATIENT
Start: 2022-05-10 | End: 2022-05-11 | Stop reason: HOSPADM

## 2022-05-10 RX ADMIN — MUPIROCIN 1 APPLICATION: 20 OINTMENT TOPICAL at 21:37

## 2022-05-10 RX ADMIN — NEOSTIGMINE METHYLSULFATE 3 MG: 0.5 INJECTION INTRAVENOUS at 10:19

## 2022-05-10 RX ADMIN — HYDROMORPHONE HYDROCHLORIDE 0.25 MG: 2 INJECTION, SOLUTION INTRAMUSCULAR; INTRAVENOUS; SUBCUTANEOUS at 10:08

## 2022-05-10 RX ADMIN — CEFAZOLIN SODIUM 2 G: 2 INJECTION, SOLUTION INTRAVENOUS at 10:18

## 2022-05-10 RX ADMIN — SODIUM CHLORIDE, POTASSIUM CHLORIDE, SODIUM LACTATE AND CALCIUM CHLORIDE 9 ML/HR: 600; 310; 30; 20 INJECTION, SOLUTION INTRAVENOUS at 06:45

## 2022-05-10 RX ADMIN — FAMOTIDINE 20 MG: 10 INJECTION INTRAVENOUS at 07:12

## 2022-05-10 RX ADMIN — SODIUM CHLORIDE 100 ML/HR: 4.5 INJECTION, SOLUTION INTRAVENOUS at 13:06

## 2022-05-10 RX ADMIN — CLINDAMYCIN PHOSPHATE 900 MG: 900 INJECTION, SOLUTION INTRAVENOUS at 21:38

## 2022-05-10 RX ADMIN — HYDROMORPHONE HYDROCHLORIDE 0.25 MG: 2 INJECTION, SOLUTION INTRAMUSCULAR; INTRAVENOUS; SUBCUTANEOUS at 10:30

## 2022-05-10 RX ADMIN — GLYCOPYRROLATE 0.2 MG: 0.2 INJECTION INTRAMUSCULAR; INTRAVENOUS at 09:00

## 2022-05-10 RX ADMIN — ROSUVASTATIN CALCIUM 10 MG: 10 TABLET, FILM COATED ORAL at 21:37

## 2022-05-10 RX ADMIN — LIDOCAINE HYDROCHLORIDE 100 MG: 20 INJECTION, SOLUTION INFILTRATION; PERINEURAL at 08:47

## 2022-05-10 RX ADMIN — DEXAMETHASONE SODIUM PHOSPHATE 8 MG: 10 INJECTION INTRAMUSCULAR; INTRAVENOUS at 08:51

## 2022-05-10 RX ADMIN — FENTANYL CITRATE 50 MCG: 0.05 INJECTION, SOLUTION INTRAMUSCULAR; INTRAVENOUS at 11:34

## 2022-05-10 RX ADMIN — ACETAMINOPHEN 1000 MG: 500 TABLET ORAL at 07:10

## 2022-05-10 RX ADMIN — ROCURONIUM BROMIDE 30 MG: 50 INJECTION INTRAVENOUS at 08:47

## 2022-05-10 RX ADMIN — FENTANYL CITRATE 25 MCG: 0.05 INJECTION, SOLUTION INTRAMUSCULAR; INTRAVENOUS at 09:09

## 2022-05-10 RX ADMIN — ATENOLOL 50 MG: 50 TABLET ORAL at 21:38

## 2022-05-10 RX ADMIN — SERTRALINE 100 MG: 100 TABLET, FILM COATED ORAL at 21:37

## 2022-05-10 RX ADMIN — ROPIVACAINE HYDROCHLORIDE 20 ML: 2 INJECTION, SOLUTION EPIDURAL; INFILTRATION at 07:24

## 2022-05-10 RX ADMIN — CLINDAMYCIN PHOSPHATE 900 MG: 900 INJECTION, SOLUTION INTRAVENOUS at 15:34

## 2022-05-10 RX ADMIN — CEFAZOLIN SODIUM 2 G: 2 INJECTION, SOLUTION INTRAVENOUS at 08:34

## 2022-05-10 RX ADMIN — MEPIVACAINE HYDROCHLORIDE 10 ML: 15 INJECTION, SOLUTION EPIDURAL; INFILTRATION at 07:24

## 2022-05-10 RX ADMIN — CLONAZEPAM 0.5 MG: 0.5 TABLET ORAL at 21:37

## 2022-05-10 RX ADMIN — GLYCOPYRROLATE 0.4 MG: 0.2 INJECTION INTRAMUSCULAR; INTRAVENOUS at 10:19

## 2022-05-10 RX ADMIN — FENTANYL CITRATE 25 MCG: 0.05 INJECTION, SOLUTION INTRAMUSCULAR; INTRAVENOUS at 09:21

## 2022-05-10 RX ADMIN — Medication 10 ML: at 15:53

## 2022-05-10 RX ADMIN — OXYCODONE HYDROCHLORIDE AND ACETAMINOPHEN 2 TABLET: 5; 325 TABLET ORAL at 18:36

## 2022-05-10 RX ADMIN — CELECOXIB 200 MG: 200 CAPSULE ORAL at 07:10

## 2022-05-10 RX ADMIN — OXYCODONE HYDROCHLORIDE AND ACETAMINOPHEN 1 TABLET: 5; 325 TABLET ORAL at 13:04

## 2022-05-10 RX ADMIN — ONDANSETRON 4 MG: 2 INJECTION INTRAMUSCULAR; INTRAVENOUS at 10:19

## 2022-05-10 RX ADMIN — FENTANYL CITRATE 50 MCG: 0.05 INJECTION, SOLUTION INTRAMUSCULAR; INTRAVENOUS at 08:46

## 2022-05-10 RX ADMIN — ZONISAMIDE 200 MG: 100 CAPSULE ORAL at 21:38

## 2022-05-10 RX ADMIN — DOCUSATE SODIUM 100 MG: 100 CAPSULE, LIQUID FILLED ORAL at 18:38

## 2022-05-10 RX ADMIN — LAMOTRIGINE 100 MG: 100 TABLET ORAL at 21:38

## 2022-05-10 RX ADMIN — OXYCODONE HYDROCHLORIDE AND ACETAMINOPHEN 2 TABLET: 5; 325 TABLET ORAL at 22:54

## 2022-05-10 RX ADMIN — LEVETIRACETAM 1500 MG: 500 TABLET, FILM COATED ORAL at 21:37

## 2022-05-10 RX ADMIN — PROPOFOL 150 MG: 10 INJECTION, EMULSION INTRAVENOUS at 08:47

## 2022-05-10 NOTE — THERAPY EVALUATION
Patient Name: Geraldine Holloway  : 1950    MRN: [id number]                              Today's Date: 5/10/2022       Admit Date: 5/10/2022    Visit Dx:     ICD-10-CM ICD-9-CM   1. Primary osteoarthritis of right knee  M17.11 715.16   2. Cystocele, lateral  N81.12 618.02     Patient Active Problem List   Diagnosis   • Precordial pain   • Essential hypertension   • Mixed hyperlipidemia   • Family history of early CAD   • Uterovaginal prolapse, incomplete   • Uterovaginal prolapse   • Cystocele, midline   • Cystocele, lateral   • Vaginal enterocele   • Rectocele   • Loss of perineal body, female   • Female stress incontinence   • Incompetence or weakening of pubocervical tissue   • Incompetence or weakening of rectovaginal tissue   • Disease of thyroid gland   • Primary osteoarthritis of right knee     Past Medical History:   Diagnosis Date   • Anxiety    • Arthritis    • ASCUS of cervix with negative high risk HPV 2008   • At risk for sleep apnea     5   • AVM (arteriovenous malformation) brain     hx surgery   • Depression    • Disease of thyroid gland    • Diverticulitis    • Diverticulitis    • History of seizures     LAST SEIZURE 5 YR AGO   • Hyperlipidemia    • Hypertension    • Memory disorder    • Menopause    • Ovarian cyst    • Skin cancer    • Stroke (HCC)     [facility] COVARRUBIAS/[facility].   • Urinary dribbling    • Urinary incontinence    • Uterine prolapse      Past Surgical History:   Procedure Laterality Date   • ANTERIOR AND POSTERIOR VAGINAL REPAIR N/A 2020    Procedure: Pubovaginal sling Posterior colporrhaphy  Insertion of vaginal grafts Cystourethroscopy;  Surgeon: Geraldine Muñoz MD;  Location: [facility];  Service: Gynecology;  Laterality: N/A;   • BREAST CYST EXCISION Left    • CATARACT EXTRACTION EXTRACAPSULAR W/ INTRAOCULAR LENS IMPLANTATION Bilateral    • COLONOSCOPY     • CRANIOTOMY     • ECTOPIC PREGNANCY  1975    [facility]/ DR. MONTEZ.   •  TOTAL LAPAROSCOPIC HYSTERECTOMY, SACROCOLPOPEXY N/A 06/02/2020    Procedure: Laparoscopic uterosacral ligament colpopexy sacral colpopexy  Laparoscopic paravaginal repair Laparoscopic hysterectomy with bilateral salpingectomy Laparoscopic abdominal enterocele repair;  Surgeon: Geraldine Muñoz MD;  Location: [facility];  Service: Gynecology;  Laterality: N/A;   • [facility] Right 2005    [facility]      General Information     Row Name 05/10/22 1514          Physical Therapy Time and Intention    Document Type evaluation  -EE     Mode of Treatment individual therapy;physical therapy  -EE     Row Name 05/10/22 1514          General Information    Prior Level of Function independent:;all household mobility;community mobility  has standard wx  -EE     Existing Precautions/Restrictions fall  -EE     Barriers to Rehab none identified  -EE     Row Name 05/10/22 1514          Living Environment    People in Home alone  going to friend's home at [place]  -EE     Row Name 05/10/22 1514          Home Main Entrance    Number of Stairs, Main Entrance two  -EE     Stair Railings, Main Entrance railings safe and in good condition  -EE     Row Name 05/10/22 1514          Stairs Within Home, Primary    Number of Stairs, Within Home, Primary none  -EE     Row Name 05/10/22 1514          Cognition    Orientation Status (Cognition) oriented x 3  -EE     Row Name 05/10/22 1514          Safety Issues, Functional Mobility    Impairments Affecting Function (Mobility) endurance/activity tolerance;range of motion (ROM);strength;pain  -EE           User Key  (r) = Recorded By, (t) = Taken By, (c) = Cosigned By    Initials Name Provider Type    EE Shania Garcia PT Physical Therapist               Mobility     Row Name 05/10/22 1516          Bed Mobility    Bed Mobility supine-sit  -EE     Supine-Sit [place] (Bed Mobility) supervision  -EE     Assistive Device (Bed Mobility) head of bed elevated;bed  rails  -EE     Comment, (Bed Mobility) increased time required to scoot to EOB  -EE     Row Name 05/10/22 1516          Sit-Stand Transfer    Sit-Stand [place] (Transfers) contact guard;verbal cues  -EE     Assistive Device (Sit-Stand Transfers) walker, front-wheeled  -EE     Row Name 05/10/22 1516          Gait/Stairs (Locomotion)    [place] Level (Gait) contact guard;verbal cues  -EE     Assistive Device (Gait) walker, front-wheeled  -EE     Ambulated day of surgery or within 4 hours of PACU discharge yes  -EE     Distance in Feet (Gait) 20'  -EE     Deviations/Abnormal Patterns (Gait) dharmesh decreased;right sided deviations;antalgic  -EE     Bilateral Gait Deviations forward flexed posture;heel strike decreased  -EE     Comment, (Gait/Stairs) cues for gait sequencing with walker  -EE           User Key  (r) = Recorded By, (t) = Taken By, (c) = Cosigned By    Initials Name Provider Type    EE Shania Garcia PT Physical Therapist               Obj/Interventions     Row Name 05/10/22 1517          Range of Motion Comprehensive    General Range of Motion lower extremity range of motion deficits identified  -EE     Comment, General Range of Motion L LE grossly WFL for age; R knee flexion ROM limited ~75%  -EE     Row Name 05/10/22 1517          Strength Comprehensive (MMT)    General Manual Muscle Testing (MMT) Assessment lower extremity strength deficits identified  -EE     Comment, General Manual Muscle Testing (MMT) Assessment R LE grossly 3/5  -EE     Row Name 05/10/22 1517          Motor Skills    Therapeutic Exercise other (see comments)  R TKA protocol x 10 reps completed independently  -EE     Row Name 05/10/22 1517          Balance    Balance Assessment sitting static balance;standing static balance;standing dynamic balance  -EE     Static Sitting Balance supervision  -EE     Position, Sitting Balance unsupported;sitting edge of bed  -EE     Static Standing Balance standby assist  -EE     Dynamic  Standing Balance contact guard  -EE     Position/Device Used, Standing Balance supported;walker, rolling  -EE     Row Name 05/10/22 1517          Sensory Assessment (Somatosensory)    Sensory Assessment (Somatosensory) LE sensation intact  -EE           User Key  (r) = Recorded By, (t) = Taken By, (c) = Cosigned By    Initials Name Provider Type    EE Shania Garcia, PT Physical Therapist               Goals/Plan     Row Name 05/10/22 1523          Transfer Goal 1 (PT)    Activity/Assistive Device (Transfer Goal 1, PT) sit-to-stand/stand-to-sit;walker, rolling  -EE     [place] Level/Cues Needed (Transfer Goal 1, PT) supervision required  -EE     Time Frame (Transfer Goal 1, PT) 3 days  -EE     Row Name 05/10/22 1523          Gait Training Goal 1 (PT)    Activity/Assistive Device (Gait Training Goal 1, PT) gait (walking locomotion);walker, rolling  -EE     [place] Level (Gait Training Goal 1, PT) supervision required  -EE     Distance (Gait Training Goal 1, PT) 150'  -EE     Time Frame (Gait Training Goal 1, PT) 3 days  -EE     Progress/Outcome (Gait Training Goal 1, PT) goal ongoing  -EE     Row Name 05/10/22 1523          ROM Goal 1 (PT)    ROM Goal 1 (PT) R knee AROM 0-90  -EE     Time Frame (ROM Goal 1, PT) 3 days  -EE     Progress/Outcome (ROM Goal 1, PT) goal ongoing  -EE     Row Name 05/10/22 1523          Therapy Assessment/Plan (PT)    Planned Therapy Interventions (PT) balance training;bed mobility training;gait training;home exercise program;patient/family education;ROM (range of motion);stair training;strengthening;transfer training  -EE           User Key  (r) = Recorded By, (t) = Taken By, (c) = Cosigned By    Initials Name Provider Type    EE Shania Garcia, ROSSI Physical Therapist               Clinical Impression     Row Name 05/10/22 1521          Pain    Pretreatment Pain Rating 0/10 - no pain  -EE     Posttreatment Pain Rating 8/10  -EE     Pain Location - Side/Orientation Right  -EE     Pain  Location incisional  -EE     Pain Location - knee  -EE     Pain Intervention(s) Repositioned;Rest  -EE     Row Name 05/10/22 1521          Plan of Care Review    Plan of Care Reviewed With patient  -EE     Outcome Evaluation Pt is a 70 yo female who presents s/p R TKA demonstrating post op pain, impaird R knee ROM, R LE weakness, and decreased endurance limiting mobility. Pt was living alone and independent with mobility prior to admission. Pt able to complete TKA exercises indep and ambulated in room with CGA and rwx. Pt will benefit from continued PT to address impairments and increase independence with mobilty. Pt plans to DC home with assist and continue with HH PT. RWX and BSC ordered for home. Plan to assess stairs at next session. No problems anticipated .  -EE     Row Name 05/10/22 1521          Therapy Assessment/Plan (PT)    Rehab Potential (PT) good, to achieve stated therapy goals  -EE     Criteria for Skilled Interventions Met (PT) yes;meets criteria;skilled treatment is necessary  -EE     Therapy Frequency (PT) daily  -EE     Row Name 05/10/22 1520          Positioning and Restraints    Pre-Treatment Position in bed  -EE     Post Treatment Position chair  -EE     In Chair reclined;call light within reach;encouraged to call for assist;exit alarm on;with family/caregiver;legs elevated  -EE           User Key  (r) = Recorded By, (t) = Taken By, (c) = Cosigned By    Initials Name Provider Type    EE Shania Garcia, PT Physical Therapist               Outcome Measures     Row Name 05/10/22 1528          How much help from another person do you currently need...    Turning from your back to your side while in flat bed without using bedrails? 4  -EE     Moving from lying on back to sitting on the side of a flat bed without bedrails? 3  -EE     Moving to and from a bed to a chair (including a wheelchair)? 3  -EE     Standing up from a chair using your arms (e.g., wheelchair, bedside chair)? 3  -EE     Climbing  3-5 steps with a railing? 2  -EE     To walk in hospital room? 3  -EE     AM-PAC 6 Clicks Score (PT) 18  -EE     Highest level of mobility 6 --> Walked 10 steps or more  -EE     Row Name 05/10/22 1525          Functional Assessment    Outcome Measure Options AM-PAC 6 Clicks Basic Mobility (PT)  -EE           User Key  (r) = Recorded By, (t) = Taken By, (c) = Cosigned By    Initials Name Provider Type    EE Shania Garcia PT Physical Therapist                             Physical Therapy Education                 Title: PT OT SLP Therapies (In Progress)     Topic: Physical Therapy (In Progress)     Point: Mobility training (Done)     Learning Progress Summary           Patient Acceptance, E,TB,D, VU,NR by EE at 5/10/2022 1526                   Point: Home exercise program (Done)     Learning Progress Summary           Patient Acceptance, E,TB,D, VU,NR by EE at 5/10/2022 1526                   Point: Body mechanics (Not Started)     Learner Progress:  Not documented in this visit.          Point: Precautions (Not Started)     Learner Progress:  Not documented in this visit.                      User Key     Initials Effective Dates Name Provider Type Discipline     06/16/21 -  Shania Garcia PT Physical Therapist PT              PT Recommendation and Plan  Planned Therapy Interventions (PT): balance training, bed mobility training, gait training, home exercise program, patient/family education, ROM (range of motion), stair training, strengthening, transfer training  Plan of Care Reviewed With: patient  Outcome Evaluation: Pt is a 70 yo female who presents s/p R TKA demonstrating post op pain, impaird R knee ROM, R LE weakness, and decreased endurance limiting mobility. Pt was living alone and independent with mobility prior to admission. Pt able to complete TKA exercises indep and ambulated in room with CGA and rwx. Pt will benefit from continued PT to address impairments and increase independence with mobilty. Pt  plans to DC home with assist and continue with HH PT. RWX and BSC ordered for home. Plan to assess stairs at next session. No problems anticipated .     Time Calculation:    PT Charges     Row Name 05/10/22 1528             Time Calculation    Start Time 1450  -EE      Stop Time 1515  -EE      Time Calculation (min) 25 min  -EE      PT Received On 05/10/22  -EE      PT - Next Appointment 05/11/22  -EE      PT Goal Re-Cert Due Date 05/13/22  -EE              Time Calculation- PT    Total Timed Code Minutes- PT 12 minute(s)  -EE              Timed Charges    78810 - PT Therapeutic Exercise Minutes 12  -EE              Total Minutes    Timed Charges Total Minutes 12  -EE       Total Minutes 12  -EE            User Key  (r) = Recorded By, (t) = Taken By, (c) = Cosigned By    Initials Name Provider Type    Shania Resendez, PT Physical Therapist              Therapy Charges for Today     Code Description Service Date Service Provider Modifiers Qty    89864093259 HC PT THER PROC EA 15 MIN 5/10/2022 Shania Garcia, PT GP 1    25282627462 HC PT EVAL LOW COMPLEXITY 2 5/10/2022 Shania Garcia, PT GP 1          PT G-Codes  Outcome Measure Options: AM-PAC 6 Clicks Basic Mobility (PT)  AM-PAC 6 Clicks Score (PT): 18     Patient was not wearing a face mask during this therapy encounter. Therapist used appropriate personal protective equipment including mask and gloves.  Mask used was standard procedure mask. Appropriate PPE was worn during the entire therapy session. Hand hygiene was completed before and after therapy session. Patient is not in enhanced droplet precautions.       Shania Garcia PT  5/10/2022

## 2022-05-10 NOTE — DISCHARGE SUMMARY
Orthopaedic Surgery Discharge Summary    Patient Name:  Geraldine Holloway  YOB: 1950  Age: 71 y.o.  Medical Records Number:  [id number]    Date of Admission:  5/10/2022  Date of Discharge:  5/11/2022    Primary Discharge Diagnosis:  Primary osteoarthritis of right knee [M17.11]    Secondary Discharge Diagnosis:    Problems Addressed this Visit        Genitourinary and Reproductive     Cystocele, lateral       Musculoskeletal and Injuries    Primary osteoarthritis of right knee - Primary    Relevant Medications    oxyCODONE-acetaminophen (PERCOCET) 5-325 MG per tablet    Other Relevant Orders    Walker    Commode Chair      Diagnoses       Codes Comments    Primary osteoarthritis of right knee    -  Primary ICD-10-CM: M17.11  ICD-9-CM: 715.16     Cystocele, lateral     ICD-10-CM: N81.12  ICD-9-CM: 618.02           Procedures Performed:  Right Total Knee Arthroplasty      Hospital Course:    Geraldine Holloway is a 71 y.o.  female who underwent successful right tka on 5/10/2022.  Geraldine Holloway was started on Aspirin 325 mg po twice daily post-operatively for DVT prophylaxis.  On post-op day 1 the patients dressing was changed and their incision was clean, with no signs of infection and their calf was soft, with no signs of DVT.  The patient progressed well with physical therapy and the patients hemoglobin remained stable. On post-operative day 1 the patient was felt ready for discharge.     Vitals:  Vitals:    05/10/22 1145 05/10/22 1200 05/10/22 1210 05/10/22 1235   BP: 136/68 131/67  143/60   BP Location:       Patient Position:       Pulse: 50 54 51 61   Resp: 12 16  16   Temp:   98 °F (36.7 °C) 97.1 °F (36.2 °C)   TempSrc:   Oral Oral   SpO2: 97% 98% 97% 100%   Weight:       Height:           Discharge Medications:      Discharge Medications      New Medications      Instructions Start Date   aspirin  MG tablet   325 mg, Oral, 2 Times Daily With Meals   Start Date: May 11, 2022     docusate sodium  250 MG capsule  Commonly known as: COLACE   250 mg, Oral, 2 Times Daily PRN      oxyCODONE-acetaminophen 5-325 MG per tablet  Commonly known as: PERCOCET   1-2 tablets, Oral, Every 4 Hours PRN         Continue These Medications      Instructions Start Date   atenolol 50 MG tablet  Commonly known as: TENORMIN   50 mg, Oral, Nightly      azelastine 0.1 % nasal spray  Commonly known as: ASTELIN   2 sprays, Nasal, 2 Times Daily, Use in each nostril as directed      Calcium Citrate-Vitamin D 250-200 MG-UNIT tablet   1 tablet, Oral, Daily      clonazePAM 0.5 MG tablet  Commonly known as: KlonoPIN   0.5 mg, Oral, 3 Times Daily      FISH OIL PO   1 capsule, Oral, Every Morning, HOLD PRIOR TO SURG      lamoTRIgine 100 MG tablet  Commonly known as: LaMICtal   100 mg, Oral, 2 Times Daily      levETIRAcetam 1000 MG tablet  Commonly known as: KEPPRA   1,500 mg, Oral, 2 Times Daily      levothyroxine 112 MCG tablet  Commonly known as: SYNTHROID, LEVOTHROID   112 mcg, Oral, Every Early Morning      losartan-hydrochlorothiazide 100-12.5 MG per tablet  Commonly known as: HYZAAR   1 tablet, Oral, Every Morning      magnesium oxide 400 tablet tablet  Commonly known as: MAG-OX   400 mg, Oral, Daily      multivitamin tablet tablet  Commonly known as: THERAGRAN   1 tablet, Oral, Daily      prochlorperazine 5 MG tablet  Commonly known as: COMPAZINE   5 mg, Oral, Every 6 Hours PRN      rosuvastatin 10 MG tablet  Commonly known as: CRESTOR   10 mg, Oral, Nightly      sertraline 100 MG tablet  Commonly known as: ZOLOFT   Oral, TAKES 50 MG AT AM  MG HS      ubrogepant 50 MG tablet  Commonly known as: UBRELVY   50 mg, Oral, 1 tab at start of migraine headache and may repeat      VITAMIN B-12 PO   1,000 mcg, Oral, Every Morning, HOLD PRIOR TO SURG      zonisamide 100 MG capsule  Commonly known as: ZONEGRAN   200 mg, Oral, Nightly         Stop These Medications    Chlorhexidine Gluconate Cloth 2 % pads     DICLOFENAC PO     mupirocin 2 %  nasal ointment  Commonly known as: BACTROBAN            Pain Medications:  Percocet 5/325 mg 1-2 po q 4-6 hours prn pain    DVT Prophylaxis:  Enteric Coated Aspirin 325 mg po twice daily for 2 weeks, then one daily for 4 weeks      Total Knee Joint Replacement Discharge Instructions:    I. ACTIVITIES:  1. Exercises:  ? Complete exercise program as taught by the hospital physical therapist 2 times per day  ? Exercise program will be advanced by the physical therapist  ? During the day be up ambulating every 2 hours (while awake) for short distances  ? Complete the ankle pump exercises at least 10 times per hour (while awake)  ? Elevate legs most of the day the first week post operatively and thereafter elevate legs when in bed and for at least 30 minutes during the day. Caution must be taken to avoid pillow placement under the bend of the knee as this can led to flexion contractures of the knee.  ? Use cold packs 20-30 minutes approximately 5 times per day. This should be done before and after completing your exercises and at any time you are experiencing pain/ stiffness in your operative extremity.      2. Activities of Daily Living:  ? No tub baths, hot tubs, or swimming pools for 4 weeks  ? May shower and let water run over the incision on post-operative day #5 if no drainage. Do not scrub or rub the incision. Simply let the water run over the incision and pat dry.    II. Restrictions  ? Do not cross legs or kneel  ? Your surgeon will discuss with you when you will be able to drive again.  ? Weight bearing is as tolerated  ? First week stay inside on even terrain. May go up and down stairs one stair at a time utilizing the hand rail  ? After one week, you may venture outside.    III. Precautions:  ? Everyone that comes near you should wash their hands  ? No elective dental, genital-urinary, or colon procedures or surgical procedures for 12 weeks after surgery unless absolutely necessary.  ?  If dental work or  surgical procedure is deemed absolutely necessary, you will need to contact your surgeon as you will need to take antibiotics 1 hour prior to any dental work (including teeth cleanings).  ? Please discuss with your surgeon prophylactic antibiotics as the length of time this intervention will be necessary for you varies with each patient’s health history and situation.  ? Avoid sick people. If you must be around someone who is ill, they should wear a mask.  ? Avoid visits to the Emergency Room or Urgent Care unless you are having a life threatening event.   ? If ordered stockings are to be placed on in the morning and removed at night. Monitor the stockings to ensure that any swelling is not causing the stockings to become too tight. In this case, remove stockings immediately.    IV. INCISION CARE:  ? Wash your hands prior to dressing changes  ? Change the dressing as needed to keep incision clean and dry. Utilize dry gauze and paper tape. Avoid touching the side of the gauze that goes against the incision with your hands.  ? No creams or ointments to the incision  ? May remove dressing once the incision is free of drainage  ? Do not touch or pick at the incision  ? Check incision every day and notify surgeon immediately if any of the following signs or symptoms are noted:  o Increase in redness  o Increase in swelling around the incision and of the entire extremity  o Increase in pain  o Drainage oozing from the incision  o Pulling apart of the edges of the incision  o Increase in overall body temperature (greater than 100.5 degrees)  ? Your surgeon will instruct you regarding suture or staple removal    V. Medications:   1. Anticoagulants: You will be discharged on an anticoagulant. This is a prophylactic medication that helps prevent blood clots during your post-operative period. The type and length of dosage varies based on your individual needs, procedure performed, and surgeon’s preference.  ? While taking the  anticoagulant, you should avoid taking any additional aspirin, ibuprofen (Advil or Motrin), Aleve (Naprosyn) or other non-steroidal anti-inflammatory medications.   ? Notify surgeon immediately if any mohinder bleeding is noted in the urine, stool, emesis, or from the nose or the incision. Blood in the stool will often appear as black rather than red. Blood in urine may appear as pink. Blood in emesis may appear as brown/black like coffee grounds.  ? You will need to apply pressure for longer periods of time to any cuts or abrasions to stop bleeding  ? Avoid alcohol while taking anticoagulants    2. Stool Softeners: You will be at greater risk of constipation after surgery due to being less mobile and the pain medications.   ? Take stool softeners as instructed by your surgeon while on pain medications. Over the counter Colace 100 mg 1-2 capsules twice daily.   ? If stools become too loose or too frequent, please decreases the dosage or stop the stool softener.  ? If constipation occurs despite use of stool softeners, you are to continue the stool softeners and add a laxative (Milk of Magnesia 1 ounce daily as needed)  ? Drink plenty of fluids, and eat fruits and vegetables during your recovery time    3. Pain Medications utilized after surgery are narcotics and the law requires that the following information be given to all patients that are prescribed narcotics:  ? CLASSIFICATION: Pain medications are called Opioids and are narcotics  ? LEGALITIES: It is illegal to share narcotics with others and to drive within 24 hours of taking narcotics  ? POTENTIAL SIDE EFFECTS: Potential side effects of opioids include: nausea, vomiting, itching, dizziness, drowsiness, dry mouth, constipation, and difficulty urinating.  ? POTENTIAL ADVERSE EFFECTS:   o Opioid tolerance can develop with use of pain medications and this simply means that it requires more and more of the medication to control pain; however, this is seen more in  patients that use opioids for longer periods of time.  o Opioid dependence can develop with use of Opioids and this simply means that to stop the medication can cause withdrawal symptoms; however, this is seen with patients that use Opioids for longer periods of time.  o Opioid addiction can develop with use of Opioids and the incidence of this is very unlikely in patients who take the medications as ordered and stop the medications as instructed.  o Opioid overdose can be dangerous, but is unlikely when the medication is taken as ordered and stopped when ordered. It is important not to mix opioids with alcohol or with and type of sedative such as Benadryl as this can lead to over sedation and respiratory difficulty.  ? DOSAGE:   o Pain medications will need to be taken consistently for the first week to decrease pain and promote adequate pain relief and participation in physical therapy.  o After the initial surgical pain begins to resolve, you may begin to decrease the pain medication. By the end of 6-8 weeks, you should be off of pain medications.  o Refills will not be given by the office during evening hours, on weekends, or after 6-8 weeks post-op.  o To seek refills on pain medications during the initial 6 week post-operative period, you must call the office 48 hours in advance to request the refill. The office will then notify you when to  the prescription. DO NOT wait until you are out of the medication to request a refill.    V. FOLLOW-UP VISITS:  ? You will need to follow up in the office with your surgeon in 3 weeks. Please call this number [phone] to schedule this appointment.  If you have any concerns or suspected complications prior to your follow up visit, please call your surgeons office. Do not wait until your appointment time if you suspect complications. These will need to be addressed in the office promptly.    Jean Kasper PA-C  [facility]  [address] [address]  [phone]    5/10/2022    CC:Verito Pulido MD:Jean Burroughs MD

## 2022-05-10 NOTE — ANESTHESIA POSTPROCEDURE EVALUATION
Patient: Geraldine Holloway    Procedure Summary     Date: 05/10/22 Room / Location: [facility] OR [address] [place] [address] MAIN OR    Anesthesia Start: 0838 Anesthesia Stop: 1056    Procedure: TOTAL KNEE ARTHROPLASTY (Right Knee) Diagnosis:     Surgeons: Jean Burroughs MD Provider: Vidal Blackman MD    Anesthesia Type: general ASA Status: 3          Anesthesia Type: general    Vitals  Vitals Value Taken Time   /67 05/10/22 1201   Temp 36.7 °C (98 °F) 05/10/22 1210   Pulse 51 05/10/22 1210   Resp 16 05/10/22 1200   SpO2 98 % 05/10/22 1211   Vitals shown include unvalidated device data.        Post Anesthesia Care and Evaluation    Patient location during evaluation: PACU  Patient participation: complete - patient participated  Level of consciousness: awake and alert  Pain management: adequate  Airway patency: patent  Anesthetic complications: No anesthetic complications    Cardiovascular status: acceptable  Respiratory status: acceptable  Hydration status: acceptable    Comments: ---------------------------               05/10/22                      1235         ---------------------------   BP:          143/60         Pulse:         61           Resp:          16           Temp:   36.2 °C (97.1 °F)   SpO2:         100%         ---------------------------

## 2022-05-10 NOTE — PLAN OF CARE
Goal Outcome Evaluation:  VSS- POD 0 of a Rt total knee. Dressing C/D/I. Neurovascular checks intact.Pt alert and oriented. Pt eating, drinking and voiding well. Pt to discharge home tomorrow.

## 2022-05-10 NOTE — OP NOTE
Orthopaedic Surgery Operative Note    Patient Name:  Geraldine Holloway  YOB: 1950  Age: 71 y.o.  Medical Records Number:  [id number]    Date of Procedure:  5/10/2022    Pre-operative Diagnosis:  Primary Osteoarthritis Right Knee    Post-operative Diagnosis:  Primary Osteoarthritis Right Knee    Procedure Performed:  Right Total Knee Arthroplasty    Implants:  Biomet Vanguard TKA, 65 Femoral Component, 71 Tibial Tray with a 40 mm Modular Stem, 31 3-Peg Patella, 12 Anterior Stabilized Polyethylene Insert    Surgeon:  Jean Burroughs M.D.    Assistant: Anne Marie uGillen (who was present during the critical portions of the case, thereby decreasing operative time and patient morbidity)    Anesthetic Type:  General    Estimated Blood Loss:  250cc's    Specimens: * No orders in the log *    No Complications      Indications for Procedure:  Geraldine Holloway is a 71 y.o. female suffering from end stage degenerative changes in the right knee.  The patients pain is becoming disabling, despite extensive conservative care, including NSAIDS, therapy and injections.  The risks, benefits and alternatives were discussed and the patient wishes to proceed with right total knee arthroplasty.      Procedure Performed:    After informed consent was obtained, the correct patient identified, the correct operative side marked by the operative surgeon and pre-operative IV Kefzol given (prior to tourniquet inflation), the patient was taken to the operating room and placed supine on the operating table.  After general anesthesia was induced, a surgical time out was performed and 1 gm of Tranxemic Acid given, a well padded tourniquet was placed and the patient's right lower extremity was prepped with ChloraPrep and draped in a sterile fashion.    A midline incision was made overlying the right knee and we sharply dissected down to expose the parapatellar retinaculum.  A muscle sparing, mid-vastus parapatellar arthrotomy was performed  and we elevated the soft tissue both medially and laterally.  The menisci and ACL were excised.  We everted the patella and measured this to be 22 mm and we removed 7 mm of bone down to 15 mm.  We measured the patella to be 31 and drilled our three drill holes.  We protected the patella with the patella protector and turned our attention to the femur and the tibia.    We drilled drill holes into the femoral and the tibial intramedullary canals and proceeded to irrigate the canals to remove the fatty marrow.  We used the intramedullary gwendolyn and the 5 degree valgus cut block to make our distal femoral cut.  Once we had a smooth surface, we measured the femur to be 65.  We assured we had rotational alignment using the epicondylar axis, then we used the four-in-one cut block to make our anterior, posterior, anterior and posterior chamfer cuts.  We placed our femoral trial, had excellent fit, and extended the knee and marked [place]'s line on the tibia.      We then turned our attention to the tibia, where we irrigated the canal again.  We used the intramedullary gwendolyn and the 3 degree posterior sloped cut block to remove 2 mm of bone from the effected side of the tibia.  Prior to making our cut, we assured we had rotational alignment using the external guide and [place]'s line.  Once we had a smooth surface, we measured the tibia to be 71.  We then removed soft tissue and bony debris from the posterior aspect of the knee.    We placed our trial implants and had excellent fit, range of motion, stability and ligament balance, throughout a complete arc of motion with a 12 AS polyethylene liner.  We removed our trial implants, punched the tibial keel, copiously irrigated the knee, then cemented our implants in place using Biomet cement.  Once the cement cured, we trialed again with the 10, 12 and 14 AS,standard and posterior lipped polyethylene liners.  The 12 AS gave us the best range of motion, stability and ligament  "balance, throughout a complete arc of motion.  We removed the trials, copiously irrigated the knee with dilute betadine solution, gave IV antibiotics and local anesthetic, then placed our permanent polyethylene liner.  We placed a 1/8\" hemovac drain, then closed the arthrotomy with #1 Vicryl pop-off sutures.  The subcutaneous tissue was closed with 2-0 vicryl pop-off sutures.  The skin was closed with staples.  We placed a sterile dressing of Xeroform, 4x4's, abdominal pads, cast padding and an Ace Wrap.  All sponge and needle counts were correct.  The patient was then awakened from general anesthesia and taken to the recovery room in stable condition.    The patient will be started on Aspirin 325 mg twice daily for DVT prophylaxis.  IV antibiotics will be discontinued within 24 hours of surgery.  Immediately prior to surgery, there were no acute Thromboembolic nor Cardiovascular risk factors.  An updated Medical Reconciliation form is on the chart.    Jean Kasper PA-C  [facility]  [address] [place], [place] [id number] [phone]    5/10/2022  "

## 2022-05-10 NOTE — ANESTHESIA PROCEDURE NOTES
Airway  Urgency: elective    Date/Time: 5/10/2022 8:50 AM  Airway not difficult    General Information and Staff    Patient location during procedure: OR  Anesthesiologist: Vidal Blackman MD  CRNA/CAA: Beatriz Enamorado CRNA    Indications and Patient Condition  Indications for airway management: airway protection    Preoxygenated: yes  MILS not maintained throughout  Mask difficulty assessment: 2 - vent by mask + OA or adjuvant +/- NMBA    Final Airway Details  Final airway type: endotracheal airway      Successful airway: ETT  Cuffed: yes   Successful intubation technique: direct laryngoscopy  Facilitating devices/methods: intubating stylet  Endotracheal tube insertion site: oral  Blade: Demetrice  Blade size: 3  ETT size (mm): 7.0  Cormack-Lehane Classification: grade I - full view of glottis  Placement verified by: chest auscultation   Measured from: lips  ETT/EBT  to lips (cm): 22  Number of attempts at approach: 1  Assessment: lips, teeth, and gum same as pre-op and atraumatic intubation

## 2022-05-10 NOTE — H&P
Orthopaedic Surgery History and Physical    Patient Name:  Geraldine Holloway  YOB: 1950  Age: 71 y.o.  Medical Records Number:  [id number]    Date of Admission:  5/10/2022  5:49 AM    Chief Complaint:  Primary osteoarthritis of right knee [M17.11]    Geraldine Holloway is a 71 y.o. female who presents c/o severe right knee pain.  The pain has been on and off for many years, worsening to the point where the pain is becoming disabling.  The pain is a constant dull ache with occasional sharp, stabbing pain.  The patient has failed conservative treatment and would like to proceed with right total knee arthroplasty.    There were no vitals taken for this visit.    Past Medical History:    Past Medical History:   Diagnosis Date   • Anxiety    • Arthritis    • ASCUS of cervix with negative high risk HPV 06/04/2008   • At risk for sleep apnea     5   • AVM (arteriovenous malformation) brain     hx surgery   • Depression    • Disease of thyroid gland    • Diverticulitis    • Diverticulitis    • History of seizures     LAST SEIZURE 5 YR AGO   • Hyperlipidemia    • Hypertension    • Memory disorder    • Menopause    • Ovarian cyst    • Skin cancer    • Stroke (HCC) 2009    DR. COVARRUBIAS/Psychiatric.   • Urinary dribbling    • Urinary incontinence    • Uterine prolapse        Past Surgical History:   Past Surgical History:   Procedure Laterality Date   • ANTERIOR AND POSTERIOR VAGINAL REPAIR N/A 06/02/2020    Procedure: Pubovaginal sling Posterior colporrhaphy  Insertion of vaginal grafts Cystourethroscopy;  Surgeon: Geraldine Muñoz MD;  Location: [facility];  Service: Gynecology;  Laterality: N/A;   • BREAST CYST EXCISION Left 1999   • CATARACT EXTRACTION EXTRACAPSULAR W/ INTRAOCULAR LENS IMPLANTATION Bilateral    • COLONOSCOPY     • CRANIOTOMY     • ECTOPIC PREGNANCY  12/1975    [facility]/ DR. MONTEZ.   • TOTAL LAPAROSCOPIC HYSTERECTOMY, SACROCOLPOPEXY N/A 06/02/2020    Procedure:  Laparoscopic uterosacral ligament colpopexy sacral colpopexy  Laparoscopic paravaginal repair Laparoscopic hysterectomy with bilateral salpingectomy Laparoscopic abdominal enterocele repair;  Surgeon: Geraldine Muñoz MD;  Location: [facility];  Service: Gynecology;  Laterality: N/A;   • WRIST SURGERY Right 2005    PIN PLACED. DR. LUCIAN ALMEIDA, [facility].       Social History:    Social History     Socioeconomic History   • Marital status:      Spouse name: KEEGAN   • Number of children: 2   Tobacco Use   • Smoking status: Never Smoker   • Smokeless tobacco: Never Used   Vaping Use   • Vaping Use: Never used   Substance and Sexual Activity   • Alcohol use: Not Currently   • Drug use: Never       Family History:    Family History   Problem Relation Age of Onset   • Emphysema Mother 65   • Rheum arthritis Mother    • Lymphoma Father 79   • Multiple myeloma Sister 41   • Lymphoma Sister 41   • Aneurysm Brother    • Bipolar disorder Son    • Heart attack Sister 41   • Malig Hyperthermia Neg Hx        Current Medications:  Scheduled Meds:acetaminophen, 1,000 mg, Oral, Once  ceFAZolin, 2 g, Intravenous, 30 Min Pre-Op  celecoxib, 200 mg, Oral, Once  famotidine, 20 mg, Intravenous, Once  Orthopedic surgery custom cocktail, , Injection, Once  sodium chloride, 3 mL, Intravenous, Q12H      Continuous Infusions:lactated ringers, 9 mL/hr      PRN Meds:.fentanyl  •  lidocaine PF 1%  •  midazolam  •  sodium chloride    Current Facility-Administered Medications:   •  acetaminophen (TYLENOL) tablet 1,000 mg, 1,000 mg, Oral, Once, Jean Burroughs MD  •  ceFAZolin in dextrose (ANCEF) IVPB solution 2 g, 2 g, Intravenous, 30 Min Pre-Op, Jean Burroughs MD  •  celecoxib (CeleBREX) capsule 200 mg, 200 mg, Oral, Once, Jean Burroughs MD  •  famotidine (PEPCID) injection 20 mg, 20 mg, Intravenous, Once, Vidal Blackman MD  •  fentaNYL citrate (PF) (SUBLIMAZE) injection 50 mcg, 50 mcg, Intravenous, Q10 Min PRN,  Vidal Blackman MD  •  lactated ringers infusion, 9 mL/hr, Intravenous, Continuous, Vidal Blackman MD  •  lidocaine PF 1% (XYLOCAINE) injection 0.5 mL, 0.5 mL, Injection, Once PRN, Vidal Blackman MD  •  midazolam (VERSED) injection 0.5 mg, 0.5 mg, Intravenous, Q10 Min PRN, Vidal Blackman MD  •  ropivacaine 0.5 % 50 mL, Morphine 5 mg, ketorolac 30 mg, EPINEPHrine 1 mg/mL 0.3 mg in sodium chloride 0.9 % 50 mL solution, , Injection, Once, Jean Burroughs MD  •  sodium chloride 0.9 % flush 3 mL, 3 mL, Intravenous, Q12H, Vidal Blackman MD  •  sodium chloride 0.9 % flush 3-10 mL, 3-10 mL, Intravenous, PRN, Vidal Blackman MD    Allergies:    Allergies   Allergen Reactions   • Iodine Rash   • Phenytoin Rash       Review of Systems:   HEENT: Patient denies any headaches, vision changes, change in hearing, or tinnitus, Patient denies any rhinorrhea,epistaxis, sinus pain, mouth or dental problems, sore throat or hoarseness, or dysphagia  Pulmonary: Patient denies any cough, congestion, SOA, or wheezing  Cardiovascular: Patient denies any chest pain, dyspnea, palpitations, weakness, intolerance of exercise, varicosities, swelling of extremities, known murmur  Gastrointestinal:  Patient denies nausea, vomiting, diarrhea, constipation, loss  of appetite, change in appetite, dysphagia, gas, heartburn, melena, change in bowel habits, use of laxatives or other drugs to alter the function of the gastrointestinal tract.  Genital/Urinary: Patient denies dysuria, change in color of urine, change in frequency of urination, pain with urgency, incontinence, retention, or nocturia.  Musculoskeletal: Patient denies increased warmth; redness; or swelling of joints; limitation of function; deformity; crepitation: pain in a joint or an extremity, the neck, or the back, especially with movement.  Neurological: Patient denies dizziness, tremor, ataxia, difficulty in speaking, change in  speech, paresthesia, loss of sensation, seizures, syncope, changes in memory.  Endocrine system: Patient denies tremors, palpitations, intolerance of heat or cold, polyuria, polydipsia, polyphagia, diaphoresis, exophthalmos, or goiter.  Psychological: Patient denies thoughts/plans or harming self or other; depression,  insomnia, night terrors, cyn, memory loss, disorientation.  Skin: Patient denies any bruising, rashes, discoloration, pruritus, wounds, ulcers, decubiti, changes in the hair or nails  Hematopoietic: Patient denies history of spontaneous or excessive bleeding, epistaxis, hematuria, melena, fatigue, enlarged or tender lymph nodes, pallor, history of anemia.      Physical Exam:   Awake, A&O x3, affect normal, no acute distress  Ambulating with a limp due to knee pain  Knee ROM is limited due to pain (5-115)  Strength is 4/5 in the quad, hamstring and calf  Cap refill is normal, Sensation intact    Card:  RR, HD Stable  Pulm:  Regular breathing, no S.O.A  Abd:  Soft, NT, ND    Lab Results (last 24 hours)     Procedure Component Value Units Date/Time    SCANNED - LABS [[id number]] Resulted: 05/10/22     Updated: 05/09/22 1233          XR Knee 1 or 2 View Right    Result Date: 4/26/2022  Narrative: RIGHT KNEE  CLINICAL HISTORY: Right knee pain. Preop arthroplasty.  AP and lateral views demonstrate extensive osteoarthritis involving all 3 compartments of the knee joint. There is marked narrowing of the patellofemoral and medial compartments with bony erosion and prominent marginal bony spurring. A large loose body is evident in the posterior aspect of the joint space. There is mild valgus angulation due to the arthritis. There is no evidence recent or old fracture. A small joint effusion is evident.  This report was finalized on 4/26/2022 8:50 PM by Dr. Cam Krueger M.D.      XR Chest PA & Lateral    Result Date: 4/26/2022  Narrative: PA AND LATERAL CHEST  CLINICAL HISTORY: Preop chest x-ray  The lungs  are well-expanded and appear free of infiltrates or masses. There are no pleural effusions. The thoracic aorta is moderately ectatic and tortuous. The heart appears within normal limits in size.  IMPRESSIONS: No evidence of acute disease within the chest.  This report was finalized on 4/26/2022 8:51 PM by Dr. Cam Krueger M.D.        Assessment:  End-stage Primary Right Knee Osteoarthritis    Plan:  Patient's pain is becoming disabling, despite extensive conservative treatment.  Radiographs reveal end-stage degenerative changes.  The risks of surgery, including, but not limited to, heart attack, stroke, dying, DVT, nerve injury, vascular injury, arthrofibrosis and infection were discussed.  The alternatives and benefits were also discussed.  All questions answered and the patient wishes to proceed with right total knee arthroplasty.    Jean Kasper PA-C  [facility]  [address] [place], [place] [id number] [phone]    5/10/2022    CC: Verito Pulido MD, Jean Burroughs MD

## 2022-05-10 NOTE — ANESTHESIA PREPROCEDURE EVALUATION
Anesthesia Evaluation     Patient summary reviewed and Nursing notes reviewed                Airway   Mallampati: II  TM distance: >3 FB  Neck ROM: full  Dental      Pulmonary - negative pulmonary ROS   Cardiovascular     ECG reviewed  Rhythm: regular  Rate: normal    (+) hypertension, hyperlipidemia,       Neuro/Psych  (+) psychiatric history Anxiety and Depression,    GI/Hepatic/Renal/Endo    (+)   thyroid problem hypothyroidism    Musculoskeletal     Abdominal    Substance History - negative use     OB/GYN negative ob/gyn ROS         Other   arthritis,                      Anesthesia Plan    ASA 3     general   (Right ACBx PSR for POPC    Patient does NOT want any VERSED-causes memory loss    I have reviewed the patient's history with the patient and the chart, including all pertinent laboratory results and imaging. I have explained the risks of anesthesia including but not limited to dental damage, corneal abrasion, nerve injury, MI, stroke, and death. Questions asked and answered. Anesthetic plan discussed with patient and team as indicated. Patient expressed understanding of the above.  )  intravenous induction     Anesthetic plan, all risks, benefits, and alternatives have been provided, discussed and informed consent has been obtained with: patient.    Plan discussed with CRNA.        CODE STATUS:

## 2022-05-10 NOTE — ANESTHESIA PROCEDURE NOTES
Peripheral Block    Pre-sedation assessment completed: 5/10/2022 7:24 AM    Patient reassessed immediately prior to procedure    Patient location during procedure: holding area  Start time: 5/10/2022 7:24 AM  Stop time: 5/10/2022 7:34 AM  Reason for block: at surgeon's request and post-op pain management  Performed by  Anesthesiologist: Vidal Blackman MD  Preanesthetic Checklist  Completed: patient identified, IV checked, site marked, risks and benefits discussed, surgical consent, monitors and equipment checked, pre-op evaluation and timeout performed  Prep:  Sterile barriers:cap, gloves, gown, mask and sterile barriers  Prep: ChloraPrep  Patient monitoring: blood pressure monitoring, continuous pulse oximetry and EKG  Procedure    Sedation: yes  Performed under: local infiltration  Guidance:ultrasound guided    ULTRASOUND INTERPRETATION.  Using ultrasound guidance a 21 G gauge needle was placed in close proximity to the femoral nerve, at which point, under ultrasound guidance anesthetic was injected in the area of the nerve and spread of the anesthesia was seen on ultrasound in close proximity thereto.  There were no abnormalities seen on ultrasound; a digital image was taken; and the patient tolerated the procedure with no complications. Images:still images obtained    Laterality:right  Block Type:adductor canal block  Injection Technique:single-shot  Needle Type:echogenic  Needle Gauge:21 G      Medications Used: Mepivacaine HCl (PF) (CARBOCAINE) 1.5 % injection, 10 mL  ropivacaine (NAROPIN) 0.2 % injection, 20 mL      Medications  Comment:Ultrasound Interpretation: Using ultrasound guidance, the needle was placed in close proximity to the target nerve and anesthetic was injected in the area of the target nerve and/or bundles, and spread of the anesthetic was seen on ultrasound in close proximity thereto.  There were no abnormalities seen on ultrasound; a digital / physical image was taken; and the  patient tolerated the procedure with no complications.   Block placed for postoperative pain control per surgeon request.    Post Assessment  Injection Assessment: negative aspiration for heme, no paresthesia on injection and incremental injection  Patient Tolerance:comfortable throughout block  Complications:no

## 2022-05-10 NOTE — PLAN OF CARE
Goal Outcome Evaluation:  Plan of Care Reviewed With: patient           Outcome Evaluation: Pt is a 72 yo female who presents s/p R TKA demonstrating post op pain, impaird R knee ROM, R LE weakness, and decreased endurance limiting mobility. Pt was living alone and independent with mobility prior to admission. Pt able to complete TKA exercises indep and ambulated in room with CGA and rwx. Pt will benefit from continued PT to address impairments and increase independence with mobilty. Pt plans to DC home with assist and continue with HH PT. RWX and BSC ordered for home. Plan to assess stairs at next session. No problems anticipated .

## 2022-05-10 NOTE — ANESTHESIA POSTPROCEDURE EVALUATION
Patient: Geraldine Holloway    Procedure Summary     Date: 05/10/22 Room / Location: [facility] OR [address] [place] [address] MAIN OR    Anesthesia Start: 0838 Anesthesia Stop: 1056    Procedure: TOTAL KNEE ARTHROPLASTY (Right Knee) Diagnosis:     Surgeons: Jean Burroughs MD Provider: Vidal Blackman MD    Anesthesia Type: general ASA Status: 3          Anesthesia Type: general    Vitals  Vitals Value Taken Time   /68 05/10/22 1146   Temp 36.4 °C (97.5 °F) 05/10/22 1050   Pulse 49 05/10/22 1148   Resp 14 05/10/22 1130   SpO2 97 % 05/10/22 1148   Vitals shown include unvalidated device data.        Post Anesthesia Care and Evaluation    Patient location during evaluation: PACU  Patient participation: complete - patient participated  Level of consciousness: awake and alert  Pain management: adequate  Airway patency: patent  Anesthetic complications: No anesthetic complications    Cardiovascular status: acceptable  Respiratory status: acceptable  Hydration status: acceptable    Comments: --------------------            05/10/22               1145     --------------------   BP:       136/68     Pulse:      50       Resp:       12       Temp:                SpO2:      97%      --------------------

## 2022-05-11 ENCOUNTER — TRANSCRIBE ORDERS (OUTPATIENT)
Dept: HOME HEALTH SERVICES | Facility: HOME HEALTHCARE | Age: 72
End: 2022-05-11

## 2022-05-11 ENCOUNTER — HOME HEALTH ADMISSION (OUTPATIENT)
Dept: HOME HEALTH SERVICES | Facility: HOME HEALTHCARE | Age: 72
End: 2022-05-11

## 2022-05-11 VITALS
TEMPERATURE: 96.7 F | BODY MASS INDEX: 35.89 KG/M2 | WEIGHT: 210.2 LBS | RESPIRATION RATE: 16 BRPM | OXYGEN SATURATION: 97 % | HEART RATE: 53 BPM | SYSTOLIC BLOOD PRESSURE: 115 MMHG | HEIGHT: 64 IN | DIASTOLIC BLOOD PRESSURE: 54 MMHG

## 2022-05-11 DIAGNOSIS — Z96.651 S/P TOTAL KNEE ARTHROPLASTY, RIGHT: Primary | ICD-10-CM

## 2022-05-11 LAB
ANION GAP SERPL CALCULATED.3IONS-SCNC: 11 MMOL/L (ref 5–15)
BUN SERPL-MCNC: 16 MG/DL (ref 8–23)
BUN/CREAT SERPL: 17 (ref 7–25)
CALCIUM SPEC-SCNC: 8.3 MG/DL (ref 8.6–10.5)
CHLORIDE SERPL-SCNC: 104 MMOL/L (ref 98–107)
CO2 SERPL-SCNC: 25 MMOL/L (ref 22–29)
CREAT SERPL-MCNC: 0.94 MG/DL (ref 0.57–1)
DEPRECATED RDW RBC AUTO: 40.6 FL (ref 37–54)
EGFRCR SERPLBLD CKD-EPI 2021: 64.6 ML/MIN/1.73
ERYTHROCYTE [DISTWIDTH] IN BLOOD BY AUTOMATED COUNT: 12.5 % (ref 12.3–15.4)
GLUCOSE SERPL-MCNC: 113 MG/DL (ref 65–99)
HCT VFR BLD AUTO: 31.8 % (ref 34–46.6)
HGB BLD-MCNC: 11.3 G/DL (ref 12–15.9)
MCH RBC QN AUTO: 31.7 PG (ref 26.6–33)
MCHC RBC AUTO-ENTMCNC: 35.5 G/DL (ref 31.5–35.7)
MCV RBC AUTO: 89.3 FL (ref 79–97)
PLATELET # BLD AUTO: 169 10*3/MM3 (ref 140–450)
PMV BLD AUTO: 9.4 FL (ref 6–12)
POTASSIUM SERPL-SCNC: 3.1 MMOL/L (ref 3.5–5.2)
RBC # BLD AUTO: 3.56 10*6/MM3 (ref 3.77–5.28)
SODIUM SERPL-SCNC: 140 MMOL/L (ref 136–145)
WBC NRBC COR # BLD: 6.26 10*3/MM3 (ref 3.4–10.8)

## 2022-05-11 PROCEDURE — G0378 HOSPITAL OBSERVATION PER HR: HCPCS

## 2022-05-11 PROCEDURE — 97110 THERAPEUTIC EXERCISES: CPT

## 2022-05-11 PROCEDURE — 80048 BASIC METABOLIC PNL TOTAL CA: CPT | Performed by: ORTHOPAEDIC SURGERY

## 2022-05-11 PROCEDURE — 85027 COMPLETE CBC AUTOMATED: CPT | Performed by: ORTHOPAEDIC SURGERY

## 2022-05-11 PROCEDURE — 97116 GAIT TRAINING THERAPY: CPT

## 2022-05-11 RX ORDER — POTASSIUM CHLORIDE 750 MG/1
10 TABLET, FILM COATED, EXTENDED RELEASE ORAL DAILY
Status: DISCONTINUED | OUTPATIENT
Start: 2022-05-11 | End: 2022-05-11 | Stop reason: HOSPADM

## 2022-05-11 RX ORDER — POTASSIUM CHLORIDE 750 MG/1
10 TABLET, FILM COATED, EXTENDED RELEASE ORAL DAILY
Qty: 30 TABLET | Refills: 1 | Status: SHIPPED | OUTPATIENT
Start: 2022-05-11

## 2022-05-11 RX ADMIN — SERTRALINE HYDROCHLORIDE 50 MG: 50 TABLET, FILM COATED ORAL at 09:32

## 2022-05-11 RX ADMIN — Medication 10 ML: at 09:35

## 2022-05-11 RX ADMIN — LAMOTRIGINE 100 MG: 100 TABLET ORAL at 09:31

## 2022-05-11 RX ADMIN — POTASSIUM CHLORIDE 10 MEQ: 750 TABLET, EXTENDED RELEASE ORAL at 09:32

## 2022-05-11 RX ADMIN — MAGNESIUM OXIDE 400 MG (241.3 MG MAGNESIUM) TABLET 400 MG: TABLET at 09:31

## 2022-05-11 RX ADMIN — OXYCODONE HYDROCHLORIDE AND ACETAMINOPHEN 1 TABLET: 5; 325 TABLET ORAL at 09:30

## 2022-05-11 RX ADMIN — LEVOTHYROXINE SODIUM 112 MCG: 0.11 TABLET ORAL at 05:08

## 2022-05-11 RX ADMIN — ASPIRIN 325 MG: 325 TABLET, COATED ORAL at 09:31

## 2022-05-11 RX ADMIN — FERROUS SULFATE TAB 325 MG (65 MG ELEMENTAL FE) 325 MG: 325 (65 FE) TAB at 09:31

## 2022-05-11 RX ADMIN — MUPIROCIN: 20 OINTMENT TOPICAL at 09:35

## 2022-05-11 RX ADMIN — CLONAZEPAM 0.5 MG: 0.5 TABLET ORAL at 09:31

## 2022-05-11 RX ADMIN — OXYCODONE HYDROCHLORIDE AND ACETAMINOPHEN 2 TABLET: 5; 325 TABLET ORAL at 03:16

## 2022-05-11 RX ADMIN — DOCUSATE SODIUM 100 MG: 100 CAPSULE, LIQUID FILLED ORAL at 09:34

## 2022-05-11 RX ADMIN — LEVETIRACETAM 1500 MG: 500 TABLET, FILM COATED ORAL at 09:32

## 2022-05-11 RX ADMIN — AZELASTINE HYDROCHLORIDE 2 SPRAY: 137 SPRAY, METERED NASAL at 09:33

## 2022-05-11 NOTE — CONSULTS
"Adult Nutrition  Assessment/PES    Patient Name:  Geraldine Holloway  YOB: 1950  MRN: [id number]  Admit Date:  5/10/2022    Assessment Date:  5/11/2022    Comments:  MST 3. Pt admit with degenerative joint disease of lower leg. BMU: 36.0 (obese class II). Pt on regular diet. % intake. 75% average intake x 2 meals documented. Pt reported she is eating good. Continue to follow per protocol.     Reason for Assessment     Row Name 05/11/22 1116          Reason for Assessment    Reason For Assessment identified at risk by screening criteria     Diagnosis other (see comments)  Pt admit with degenerative joint disease of lower leg     Identified At Risk by Screening Criteria MST SCORE 2+                Nutrition/Diet History     Row Name 05/11/22 1116          Nutrition/Diet History    Typical Intake (Food/Fluid/EN/PN) Pt reported eating good.                Anthropometrics     Row Name 05/11/22 1118          Anthropometrics    Height for Calculation 1.626 m (5' 4.02\")     Weight for Calculation 95.3 kg (210 lb 1.6 oz)     Additional Documentation --  BMI: 36.0                Labs/Tests/Procedures/Meds     Row Name 05/11/22 1116          Labs/Procedures/Meds    Lab Results Reviewed reviewed     Lab Results Comments K (low), glucose (high)            Diagnostic Tests/Procedures    Diagnostic Test/Procedure Reviewed reviewed            Medications    Pertinent Medications Reviewed reviewed     Pertinent Medications Comments tenormin, klonopin, ferrous sulfate, lamictal, keppra, levothyroxine, cozaar, mag-ox, potassium chloride, crestor, zoloft, sodium chloride, zonegran, continuous: lactated ringers, sodium chloride, prn: colace, percocet                  Estimated/Assessed Needs - Anthropometrics     Row Name 05/11/22 1118          Anthropometrics    Height for Calculation 1.626 m (5' 4.02\")     Weight for Calculation 95.3 kg (210 lb 1.6 oz)     Additional Documentation --  BMI: 36.0                " Nutrition Prescription Ordered     Row Name 05/11/22 1118          Nutrition Prescription PO    Current PO Diet Regular                Evaluation of Received Nutrient/Fluid Intake     Row Name 05/11/22 1118          PO Evaluation    % PO Intake %                     Problem/Interventions:   Problem 1     Row Name 05/11/22 1119          Nutrition Diagnoses Problem 1    Problem 1 Overweight/Obesity     Etiology (related to) Other (comment)  caloric intake greater than caloric needs     Signs/Symptoms (evidenced by) BMI     BMI 35 - 39.9                      Intervention Goal     Row Name 05/11/22 1119          Intervention Goal    General Maintain nutrition;Improved nutrition related lab(s);Meet nutritional needs for age/condition;Disease management/therapy     PO Tolerate PO;Meet estimated needs;Establish PO     Weight Appropriate weight loss                Nutrition Intervention     Row Name 05/11/22 1119          Nutrition Intervention    RD/Tech Action Care plan reviewd;Follow Tx progress                  Education/Evaluation     Row Name 05/11/22 1119          Education    Education Will Instruct as appropriate            Monitor/Evaluation    Monitor Per protocol;I&O;PO intake;Pertinent labs;Weight;Skin status;Symptoms                 Electronically signed by:  Estrella Mcgarry RD  05/11/22 11:20 EDT

## 2022-05-11 NOTE — NURSING NOTE
Spoke with Tierra Kasper at Dr. Cummings office regarding patient and family concerns of Aspirin 325 mg BID Due to history of hemmorhagic stroke.  Ordered to tell patient on Discharge to take Aspirin 325mg once per day and Physcician will call patient later at home regarding Aspirin regimen.

## 2022-05-11 NOTE — PLAN OF CARE
Goal Outcome Evaluation:  Plan of Care Reviewed With: patient        Progress: improving  Outcome Evaluation: Pt agreeable to PT this AM and able to significantly progress gait distance. Pt transferred to EOB with SV and STS with SBA. Pt ambulated 60ft in hallway before fatiguing and was rolled in recliner the rest of the way to the stairs. Pt did 4 steps with SBA-CGA with cues for sequencing, but pt reporting this is how she has been doing stairs - no unsteadiness or safety concerns. Pt able to ambulate full distance 120ft back to room and stopped in bathroom - independent with toileting and STS from commode. Pt agreeable to sitting UIC following session and left with all needs met. Pt will continue to benefit from skilled PT at home to address functional deficits and improve overall mobility. Anticipate D/C home with HHPT today.

## 2022-05-11 NOTE — DISCHARGE PLACEMENT REQUEST
"Geraldine Holloway (71 y.o. Female)             Date of Birth   1950    Social Security Number       Address   [address] [id number]    Home Phone   [phone]    MRN   [id number]       Congregational   Protestant    Marital Status                               Admission Date   5/10/22    Admission Type   Elective    Admitting Provider   Jean Burroughs MD    Attending Provider   Jean Burroughs MD    Department, Room/Bed   [address], P776/1       Discharge Date       Discharge Disposition   Home or Self Care    Discharge Destination                               Attending Provider: Jean Burroughs MD    Allergies: Iodine, Phenytoin    Isolation: None   Infection: None   Code Status: Prior   Advance Care Planning Activity    Ht: 162.6 cm (64\")   Wt: 95.3 kg (210 lb 3.2 oz)    Admission Cmt: None   Principal Problem: None                Active Insurance as of 5/10/2022     Primary Coverage     Payor Plan Insurance Group Employer/Plan Group    HUMANA MEDICARE REPLACEMENT HUMANA MEDICARE REPLACEMENT [id number]     Payor Plan Address Payor Plan Phone Number Payor Plan Fax Number Effective Dates    [address] [address] [phone]  1/1/2022 - None Entered    [facility] [address]       Subscriber Name Subscriber Birth Date Member ID       GERALDINE HOLLOWAY 1950 [id number]                 Emergency Contacts      (Rel.) Home Phone Work Phone Mobile Phone    CrystalMila (Sister) [phone] -- [phone]    Agustin Leeel (Son) [phone] -- [phone]    Colleen Arellano (Sister) -- -- [phone]              "

## 2022-05-11 NOTE — PLAN OF CARE
Goal Outcome Evaluation:  Plan of Care Reviewed With: patient        Progress: improving  Outcome Evaluation: VSS, RA, 2LNC @HS, SL, up with assist of 1, voiding per BRP, pain tolerable, seizure precautions maintained with siderails padded, HV to be DC'd with dressing change this AM, educated on BP and seizure monitoring, home possible today

## 2022-05-11 NOTE — THERAPY DISCHARGE NOTE
Patient Name: Geraldine Holloway  : 1950    MRN: [id number]                              Today's Date: 2022       Admit Date: 5/10/2022    Visit Dx:     ICD-10-CM ICD-9-CM   1. Primary osteoarthritis of right knee  M17.11 715.16   2. Cystocele, lateral  N81.12 618.02     Patient Active Problem List   Diagnosis   • Precordial pain   • Essential hypertension   • Mixed hyperlipidemia   • Family history of early CAD   • Uterovaginal prolapse, incomplete   • Uterovaginal prolapse   • Cystocele, midline   • Cystocele, lateral   • Vaginal enterocele   • Rectocele   • Loss of perineal body, female   • Female stress incontinence   • Incompetence or weakening of pubocervical tissue   • Incompetence or weakening of rectovaginal tissue   • Disease of thyroid gland   • Primary osteoarthritis of right knee     Past Medical History:   Diagnosis Date   • Anxiety    • Arthritis    • ASCUS of cervix with negative high risk HPV 2008   • At risk for sleep apnea     5   • AVM (arteriovenous malformation) brain     hx surgery   • Depression    • Disease of thyroid gland    • Diverticulitis    • Diverticulitis    • History of seizures     LAST SEIZURE 5 YR AGO   • Hyperlipidemia    • Hypertension    • Memory disorder    • Menopause    • Ovarian cyst    • Skin cancer    • Stroke (HCC)     DR. COVARRUBIAS/[facility].   • Urinary dribbling    • Urinary incontinence    • Uterine prolapse      Past Surgical History:   Procedure Laterality Date   • ANTERIOR AND POSTERIOR VAGINAL REPAIR N/A 2020    Procedure: Pubovaginal sling Posterior colporrhaphy  Insertion of vaginal grafts Cystourethroscopy;  Surgeon: Geraldine Muñoz MD;  Location: [facility];  Service: Gynecology;  Laterality: N/A;   • BREAST CYST EXCISION Left    • CATARACT EXTRACTION EXTRACAPSULAR W/ INTRAOCULAR LENS IMPLANTATION Bilateral    • COLONOSCOPY     • CRANIOTOMY     • ECTOPIC PREGNANCY  1975    [facility]/ DR. MONTEZ.   •  TOTAL KNEE ARTHROPLASTY Right 5/10/2022    Procedure: TOTAL KNEE ARTHROPLASTY;  Surgeon: Jean Burroughs MD;  Location: [facility] OR;  Service: Orthopedics;  Laterality: Right;   • TOTAL LAPAROSCOPIC HYSTERECTOMY, SACROCOLPOPEXY N/A 06/02/2020    Procedure: Laparoscopic uterosacral ligament colpopexy sacral colpopexy  Laparoscopic paravaginal repair Laparoscopic hysterectomy with bilateral salpingectomy Laparoscopic abdominal enterocele repair;  Surgeon: Geraldine Muñoz MD;  Location: [facility] OR;  Service: Gynecology;  Laterality: N/A;   • WRIST SURGERY Right 2005    [facility]      General Information     [facility] Name 05/11/22 1029          Physical Therapy Time and Intention    Document Type therapy note (daily note)  -     Mode of Treatment individual therapy;physical therapy  -[facility] Name 05/11/22 1029          General Information    Patient Profile Reviewed yes  -     Existing Precautions/Restrictions fall  -[facility] Name 05/11/22 1029          Cognition    Orientation Status (Cognition) oriented x 3  -[facility] Name 05/11/22 1029          Safety Issues, Functional Mobility    Impairments Affecting Function (Mobility) endurance/activity tolerance;range of motion (ROM);strength;pain  -           User Key  (r) = Recorded By, (t) = Taken By, (c) = Cosigned By    Initials Name Provider Type     Sandy Bosch PT Physical Therapist               Mobility     [facility] Name 05/11/22 1030          Bed Mobility    Bed Mobility supine-sit  -     Supine-Sit [place] (Bed Mobility) supervision  -     Assistive Device (Bed Mobility) head of bed elevated;bed rails  -[facility] Name 05/11/22 1030          Sit-Stand Transfer    Sit-Stand [place] (Transfers) verbal cues;standby assist  -     Assistive Device (Sit-Stand Transfers) walker, front-wheeled  -     Row Name 05/11/22 1030          Gait/Stairs (Locomotion)    [place] Level (Gait) verbal  cues;supervision;modified independence  -     Assistive Device (Gait) walker, front-wheeled  -     Distance in Feet (Gait) 60ft + 120ft  -     Deviations/Abnormal Patterns (Gait) dharmesh decreased;right sided deviations;antalgic  -     Bilateral Gait Deviations forward flexed posture;heel strike decreased  -     [place] Level (Stairs) contact guard;stand by assist;verbal cues  -     Handrail Location (Stairs) both sides  -     Number of Steps (Stairs) 4  -     Ascending Technique (Stairs) step-to-step  -     Descending Technique (Stairs) step-to-step  -     Comment, (Gait/Stairs) Tolerated progressed gait distance, fatigued walking to stairs - 60ft then rolled the rest of the way in chair, but able to walk full distance back to room. Did 4 steps with CGA-SBA, cues for sequencing with no unsteadiness or LOB  -           User Key  (r) = Recorded By, (t) = Taken By, (c) = Cosigned By    Initials Name Provider Type    Sandy Main PT Physical Therapist               Obj/Interventions     Row Name 05/11/22 1032          Motor Skills    Therapeutic Exercise --  10 reps TKA protocol  -           User Key  (r) = Recorded By, (t) = Taken By, (c) = Cosigned By    Initials Name Provider Type    Sandy Main PT Physical Therapist               Goals/Plan     Row Name 05/11/22 1035          Transfer Goal 1 (PT)    Activity/Assistive Device (Transfer Goal 1, PT) sit-to-stand/stand-to-sit;walker, rolling  -     [place] Level/Cues Needed (Transfer Goal 1, PT) supervision required  -     Time Frame (Transfer Goal 1, PT) 3 days  -     Progress/Outcome (Transfer Goal 1, PT) goal met  -     Row Name 05/11/22 1035          Gait Training Goal 1 (PT)    Activity/Assistive Device (Gait Training Goal 1, PT) gait (walking locomotion);walker, rolling  -     [place] Level (Gait Training Goal 1, PT) supervision required  -     Distance (Gait Training Goal 1, PT) 150'  -      Time Frame (Gait Training Goal 1, PT) 3 days  -     Progress/Outcome (Gait Training Goal 1, PT) goal met  -     Row Name 05/11/22 1035          ROM Goal 1 (PT)    ROM Goal 1 (PT) R knee AROM 0-90  -     Time Frame (ROM Goal 1, PT) 3 days  -     Progress/Outcome (ROM Goal 1, PT) goal partially met  -           User Key  (r) = Recorded By, (t) = Taken By, (c) = Cosigned By    Initials Name Provider Type     Sandy Bosch, PT Physical Therapist               Clinical Impression     [facility] Name 05/11/22 1032          Pain    Pretreatment Pain Rating 8/10  -     Posttreatment Pain Rating 8/10  -     Pain Location - Side/Orientation Right  -     Pain Location incisional  -     Pain Location - knee  -     Pain Intervention(s) Repositioned;Ambulation/increased activity  -     Row Name 05/11/22 1032          Plan of Care Review    Plan of Care Reviewed With patient  -     Progress improving  -     Outcome Evaluation Pt agreeable to PT this AM and able to significantly progress gait distance. Pt transferred to EOB with SV and STS with SBA. Pt ambulated 60ft in hallway before fatiguing and was rolled in recliner the rest of the way to the stairs. Pt did 4 steps with SBA-CGA with cues for sequencing, but pt reporting this is how she has been doing stairs - no unsteadiness or safety concerns. Pt able to ambulate full distance 120ft back to room and stopped in bathroom - independent with toileting and STS from commode. Pt agreeable to sitting UI following session and left with all needs met. Pt will continue to benefit from skilled PT at home to address functional deficits and improve overall mobility. Anticipate D/C home with HHPT today.  -     Row Name 05/11/22 1032          Vital Signs    O2 Delivery Pre Treatment supplemental O2  -     O2 Delivery Intra Treatment room air  -     O2 Delivery Post Treatment supplemental O2  -[facility] Name 05/11/22 1032          Positioning and Restraints     Pre-Treatment Position in bed  -BH     Post Treatment Position chair  -BH     In Chair reclined;call light within reach;encouraged to call for assist;exit alarm on;notified nsg  -           User Key  (r) = Recorded By, (t) = Taken By, (c) = Cosigned By    Initials Name Provider Type    Sandy Main PT Physical Therapist               Outcome Measures     Row Name 05/11/22 1036          How much help from another person do you currently need...    Turning from your back to your side while in flat bed without using bedrails? 4  -BH     Moving from lying on back to sitting on the side of a flat bed without bedrails? 3  -BH     Moving to and from a bed to a chair (including a wheelchair)? 4  -BH     Standing up from a chair using your arms (e.g., wheelchair, bedside chair)? 4  -BH     Climbing 3-5 steps with a railing? 3  -BH     To walk in hospital room? 3  -BH     AM-PAC 6 Clicks Score (PT) 21  -     Highest level of mobility 6 --> Walked 10 steps or more  -           User Key  (r) = Recorded By, (t) = Taken By, (c) = Cosigned By    Initials Name Provider Type    Sandy Main PT Physical Therapist              Physical Therapy Education                 Title: PT OT SLP Therapies (Done)     Topic: Physical Therapy (Done)     Point: Mobility training (Done)     Learning Progress Summary           Patient Acceptance, E,TB,D, VU,NR by  at 5/11/2022 1036    Acceptance, E,TB,D, VU,NR by  at 5/10/2022 1526                   Point: Home exercise program (Done)     Learning Progress Summary           Patient Acceptance, E,TB,D, VU,NR by  at 5/11/2022 1036    Acceptance, E,TB,D, VU,NR by  at 5/10/2022 1526                   Point: Body mechanics (Done)     Learning Progress Summary           Patient Acceptance, E,TB,D, VU,NR by  at 5/11/2022 1036                   Point: Precautions (Done)     Learning Progress Summary           Patient Acceptance, E,TB,D, VU,NR by  at 5/11/2022 1036                                User Key     Initials Effective Dates Name Provider Type Discipline    EE 06/16/21 -  Shania Garcia, PT Physical Therapist PT     04/08/22 -  Sandy Bosch PT Physical Therapist PT              PT Recommendation and Plan     Plan of Care Reviewed With: patient  Progress: improving  Outcome Evaluation: Pt agreeable to PT this AM and able to significantly progress gait distance. Pt transferred to EOB with SV and STS with SBA. Pt ambulated 60ft in hallway before fatiguing and was rolled in recliner the rest of the way to the stairs. Pt did 4 steps with SBA-CGA with cues for sequencing, but pt reporting this is how she has been doing stairs - no unsteadiness or safety concerns. Pt able to ambulate full distance 120ft back to room and stopped in bathroom - independent with toileting and STS from commode. Pt agreeable to sitting UIC following session and left with all needs met. Pt will continue to benefit from skilled PT at home to address functional deficits and improve overall mobility. Anticipate D/C home with HHPT today.     Time Calculation:    PT Charges     Row Name 05/11/22 1037             Time Calculation    Start Time 0837  -      Stop Time 0905  -      Time Calculation (min) 28 min  -      PT Received On 05/11/22  -              Time Calculation- PT    Total Timed Code Minutes- PT 28 minute(s)  -              Timed Charges    78447 - PT Therapeutic Exercise Minutes 8  -      65596 - Gait Training Minutes  15  -      11912 - PT Therapeutic Activity Minutes 5  -              Total Minutes    Timed Charges Total Minutes 28  -       Total Minutes 28  -            User Key  (r) = Recorded By, (t) = Taken By, (c) = Cosigned By    Initials Name Provider Type     Sandy Bosch PT Physical Therapist              Therapy Charges for Today     Code Description Service Date Service Provider Modifiers Qty    54845288384 HC PT THER PROC EA 15 MIN 5/11/2022 Sandy Bosch  BROWN, PT GP 1    57236914113 HC GAIT TRAINING EA 15 MIN 5/11/2022 Sandy Bosch, PT GP 1          PT G-Codes  Outcome Measure Options: AM-PAC 6 Clicks Basic Mobility (PT)  AM-PAC 6 Clicks Score (PT): 21    PT Discharge Summary  Anticipated Discharge Disposition (PT): home with assist, home with home health    Sandy Bosch, PT  5/11/2022

## 2022-05-11 NOTE — PLAN OF CARE
Goal Outcome Evaluation:  VSS- pt alert and oriented. POD 1 of a Rt total knee. Dressing C/D/I. Neurovascular checks WDL. Pt refused BP med due to BP being low. 115/56. Pt eating, drinking and voiding well. Pain well controlled with oral pain meds. Pt to discharge home with family. Pt watching IPAD discharge education.

## 2022-05-11 NOTE — PROGRESS NOTES
[facility] following for home care needs.  Patient will be staying with Caryn Luque [phone] at [address] [place] [address], [place] [address].  Call patient's cell first, If unable to reach call Caryn.  See careplan.  Order to be transcribed.

## 2022-05-11 NOTE — PROGRESS NOTES
Orthopaedic Surgery  Progress Note  5/11/2022    Patients Name:  Geraldine Holloway  YOB: 1950  Age: 71 y.o.  Medical Records Number:  [id number]  Date of Admission: 5/10/2022    No complaints except pain    Vitals:  Vitals:    05/10/22 1938 05/10/22 2334 05/11/22 0357 05/11/22 0700   BP: 113/69 129/68 133/70 106/53   BP Location: Right arm Right arm Right arm Right arm   Patient Position: Lying Lying Lying Lying   Pulse: 58 59 76 53   Resp: 16 18 16 16   Temp: 97.3 °F (36.3 °C) 97.8 °F (36.6 °C) 98 °F (36.7 °C) 96.7 °F (35.9 °C)   TempSrc: Oral Oral Oral Oral   SpO2: 92% 93% 99% 97%   Weight:       Height:           RLE:  NVI, calf nontender, sensation intact  No signs of DVT    Incision: clean, no signs of infection    Lab Results (last 24 hours)     Procedure Component Value Units Date/Time    Basic Metabolic Panel [[id number]]  (Abnormal) Collected: 05/11/22 0450    Specimen: Blood Updated: 05/11/22 0557     Glucose 113 mg/dL      BUN 16 mg/dL      Creatinine 0.94 mg/dL      Sodium 140 mmol/L      Potassium 3.1 mmol/L      Chloride 104 mmol/L      CO2 25.0 mmol/L      Calcium 8.3 mg/dL      BUN/Creatinine Ratio 17.0     Anion Gap 11.0 mmol/L      eGFR 64.6 mL/min/1.73      Comment: National Kidney Foundation and American Society of Nephrology (ASN) Task Force recommended calculation based on the Chronic Kidney Disease Epidemiology Collaboration (CKD-EPI) equation refit without adjustment for race.       Narrative:      GFR Normal >60  Chronic Kidney Disease <60  Kidney Failure <15      CBC (No Diff) [[id number]]  (Abnormal) Collected: 05/11/22 0450    Specimen: Blood Updated: 05/11/22 0533     WBC 6.26 10*3/mm3      RBC 3.56 10*6/mm3      Hemoglobin 11.3 g/dL      Hematocrit 31.8 %      MCV 89.3 fL      MCH 31.7 pg      MCHC 35.5 g/dL      RDW 12.5 %      RDW-SD 40.6 fl      MPV 9.4 fL      Platelets 169 10*3/mm3           XR Knee 1 or 2 View Right    Result Date: 5/10/2022  Narrative: XR KNEE 1 OR 2  VW RIGHT-  05/10/2022  HISTORY: Postop right knee arthroplasty.  The distal femoral and proximal tibia components of the right knee prosthesis are well-seated with no abnormal surrounding bony lucencies. Soft tissue air skin staples and small caliber catheter are seen. There is no unexpected findings are noted.      Impression: Satisfactory postoperative appearance of the right knee.  This report was finalized on 5/10/2022 11:35 AM by Dr. Felix Sampson M.D.      XR Knee 1 or 2 View Right    Result Date: 4/26/2022  Narrative: RIGHT KNEE  CLINICAL HISTORY: Right knee pain. Preop arthroplasty.  AP and lateral views demonstrate extensive osteoarthritis involving all 3 compartments of the knee joint. There is marked narrowing of the patellofemoral and medial compartments with bony erosion and prominent marginal bony spurring. A large loose body is evident in the posterior aspect of the joint space. There is mild valgus angulation due to the arthritis. There is no evidence recent or old fracture. A small joint effusion is evident.  This report was finalized on 4/26/2022 8:50 PM by Dr. Cam Krueger M.D.      Peripheral Block    Result Date: 5/10/2022  Narrative: Vidal Blackman MD     5/10/2022  7:25 AM Peripheral Block Pre-sedation assessment completed: 5/10/2022 7:24 AM Patient reassessed immediately prior to procedure Patient location during procedure: holding area Start time: 5/10/2022 7:24 AM Stop time: 5/10/2022 7:34 AM Reason for block: at surgeon's request and post-op pain management Performed by Anesthesiologist: Vidal Blackman MD Preanesthetic Checklist Completed: patient identified, IV checked, site marked, risks and benefits discussed, surgical consent, monitors and equipment checked, pre-op evaluation and timeout performed Prep: Sterile barriers:cap, gloves, gown, mask and sterile barriers Prep: ChloraPrep Patient monitoring: blood pressure monitoring, continuous pulse oximetry and EKG  Procedure Sedation: yes Performed under: local infiltration Guidance:ultrasound guided ULTRASOUND INTERPRETATION.  Using ultrasound guidance a 21 G gauge needle was placed in close proximity to the femoral nerve, at which point, under ultrasound guidance anesthetic was injected in the area of the nerve and spread of the anesthesia was seen on ultrasound in close proximity thereto.  There were no abnormalities seen on ultrasound; a digital image was taken; and the patient tolerated the procedure with no complications. Images:still images obtained Laterality:right Block Type:adductor canal block Injection Technique:single-shot Needle Type:echogenic Needle Gauge:21 G Medications Used: Mepivacaine HCl (PF) (CARBOCAINE) 1.5 % injection, 10 mL ropivacaine (NAROPIN) 0.2 % injection, 20 mL Medications Comment:Ultrasound Interpretation: Using ultrasound guidance, the needle was placed in close proximity to the target nerve and anesthetic was injected in the area of the target nerve and/or bundles, and spread of the anesthetic was seen on ultrasound in close proximity thereto.  There were no abnormalities seen on ultrasound; a digital / physical image was taken; and the patient tolerated the procedure with no complications. Block placed for postoperative pain control per surgeon request. Post Assessment Injection Assessment: negative aspiration for heme, no paresthesia on injection and incremental injection Patient Tolerance:comfortable throughout block Complications:no     XR Chest PA & Lateral    Result Date: 4/26/2022  Narrative: PA AND LATERAL CHEST  CLINICAL HISTORY: Preop chest x-ray  The lungs are well-expanded and appear free of infiltrates or masses. There are no pleural effusions. The thoracic aorta is moderately ectatic and tortuous. The heart appears within normal limits in size.  IMPRESSIONS: No evidence of acute disease within the chest.  This report was finalized on 4/26/2022 8:51 PM by Dr. Cam Krueger M.D.         Assesment/Plan:    Procedures:  Right TKA  Postoperative Day: 1  Weightbearing Status:  WBAT with walker  DVT Prophylaxis:  ASA for DVT prophylaxis    Disposition:  Home with home health after PT today, if comfortable and mobilizing safely    Jean Kasper  [facility]  [address] [place] [address]  [phone]    5/11/2022

## 2022-05-12 ENCOUNTER — HOME CARE VISIT (OUTPATIENT)
Dept: HOME HEALTH SERVICES | Facility: HOME HEALTHCARE | Age: 72
End: 2022-05-12

## 2022-05-12 VITALS
TEMPERATURE: 96.7 F | RESPIRATION RATE: 18 BRPM | SYSTOLIC BLOOD PRESSURE: 124 MMHG | DIASTOLIC BLOOD PRESSURE: 70 MMHG | HEART RATE: 72 BPM | OXYGEN SATURATION: 96 %

## 2022-05-12 PROCEDURE — G0151 HHCP-SERV OF PT,EA 15 MIN: HCPCS

## 2022-05-12 NOTE — HOME HEALTH
REASON FOR REFERRAL:  decreased ability to ambulate in and out of the home following recent hospital stay for R TKA by Dr. Burroughs on 5/10/22 resulting in functional decline and LE weakness.    MEDICAL HISTORY: Anxiety, Arthritis, Depression, Disease of thyroid gland, Diverticulitis, History of seizures, Hyperlipidemia, Hypertension, Ovarian cyst, Skin cancer, Stroke, Uterine prolapse    SKILLED PHYSICAL THERAPY IS MEDICALLY NECESSARY FOR: Instruction/education in lower extremity strengthening HEP, bed mobility/transfers training, gait training, balance training, fall prevention, and activity tolerance training to enable patient to safely exit home for medical appointments.    WBAT R LE    Patient may change the dressing as needed to keep incision clean and dry, utilize dry gauze and paper tape/island dressing. May shower and let water run over the incision on post-operative day #5 if no drainage    Staples to be removed on 5/24/22.    Patient is currently stay with a friend who is able to provided supervision and assistance as needed, there are 3 steps with 1 rail to enter home.  Patient ambulated for 50 feet with CGA/SBA with use of rolling walker and antalgic gait pattern due to pain of 9/10 in R knee region.  Bed mobility with mod assist, and transfers with CGA/SBA.  AROM R knee extension lag of 30 degrees and knee flexion to 67 degrees.  Patient was started on supine HEP with handout provided.  Frequency: 2w1, 3w1, 1w1.

## 2022-05-12 NOTE — CASE MANAGEMENT/SOCIAL WORK
Case Management Discharge Note      Final Note: Home with [facility].         Selected Continued Care - Discharged on 5/11/2022 Admission date: 5/10/2022 - Discharge disposition: Home or Self Care    Destination    No services have been selected for the patient.              Durable Medical Equipment    No services have been selected for the patient.              Dialysis/Infusion    No services have been selected for the patient.              Home Medical Care Coordination complete.    Service Provider Selected Services Address Phone Fax Patient Preferred    [facility]  Home Health Services [address] [place] [id number] [phone] --          Therapy    No services have been selected for the patient.              Community Resources    No services have been selected for the patient.              Community & DME    No services have been selected for the patient.                       Final Discharge Disposition Code: 06 - home with home health care

## 2022-05-13 ENCOUNTER — HOME CARE VISIT (OUTPATIENT)
Dept: HOME HEALTH SERVICES | Facility: HOME HEALTHCARE | Age: 72
End: 2022-05-13

## 2022-05-13 VITALS
OXYGEN SATURATION: 97 % | HEART RATE: 58 BPM | TEMPERATURE: 96.7 F | DIASTOLIC BLOOD PRESSURE: 74 MMHG | SYSTOLIC BLOOD PRESSURE: 126 MMHG

## 2022-05-13 PROCEDURE — G0151 HHCP-SERV OF PT,EA 15 MIN: HCPCS

## 2022-05-15 ENCOUNTER — HOSPITAL ENCOUNTER (OUTPATIENT)
Facility: HOSPITAL | Age: 72
Setting detail: OBSERVATION
Discharge: HOME HEALTH | End: 2022-05-17
Attending: EMERGENCY MEDICINE | Admitting: STUDENT IN AN ORGANIZED HEALTH CARE EDUCATION/TRAINING PROGRAM

## 2022-05-15 ENCOUNTER — APPOINTMENT (OUTPATIENT)
Dept: GENERAL RADIOLOGY | Facility: HOSPITAL | Age: 72
End: 2022-05-15

## 2022-05-15 DIAGNOSIS — R50.82 POSTOPERATIVE FEVER: Primary | ICD-10-CM

## 2022-05-15 DIAGNOSIS — E86.0 MILD DEHYDRATION: ICD-10-CM

## 2022-05-15 DIAGNOSIS — R41.0 CONFUSION: ICD-10-CM

## 2022-05-15 DIAGNOSIS — N28.9 ACUTE RENAL INSUFFICIENCY: ICD-10-CM

## 2022-05-15 DIAGNOSIS — M17.11 PRIMARY OSTEOARTHRITIS OF RIGHT KNEE: ICD-10-CM

## 2022-05-15 PROBLEM — Z96.659 HISTORY OF TOTAL KNEE ARTHROPLASTY: Status: ACTIVE | Noted: 2022-05-15

## 2022-05-15 PROBLEM — J98.11 ATELECTASIS: Status: ACTIVE | Noted: 2022-05-15

## 2022-05-15 PROBLEM — G93.41 METABOLIC ENCEPHALOPATHY: Status: ACTIVE | Noted: 2022-05-15

## 2022-05-15 PROBLEM — E87.6 HYPOKALEMIA: Status: ACTIVE | Noted: 2022-05-15

## 2022-05-15 LAB
ALBUMIN SERPL-MCNC: 2.9 G/DL (ref 3.5–5.2)
ALBUMIN/GLOB SERPL: 1 G/DL
ALP SERPL-CCNC: 125 U/L (ref 39–117)
ALT SERPL W P-5'-P-CCNC: 12 U/L (ref 1–33)
ANION GAP SERPL CALCULATED.3IONS-SCNC: 10 MMOL/L (ref 5–15)
ANISOCYTOSIS BLD QL: ABNORMAL
AST SERPL-CCNC: 19 U/L (ref 1–32)
B PARAPERT DNA SPEC QL NAA+PROBE: NOT DETECTED
B PERT DNA SPEC QL NAA+PROBE: NOT DETECTED
BILIRUB SERPL-MCNC: 1.2 MG/DL (ref 0–1.2)
BILIRUB UR QL STRIP: NEGATIVE
BUN SERPL-MCNC: 26 MG/DL (ref 8–23)
BUN/CREAT SERPL: 21.3 (ref 7–25)
C PNEUM DNA NPH QL NAA+NON-PROBE: NOT DETECTED
CALCIUM SPEC-SCNC: 8.7 MG/DL (ref 8.6–10.5)
CHLORIDE SERPL-SCNC: 106 MMOL/L (ref 98–107)
CLARITY UR: CLEAR
CO2 SERPL-SCNC: 25 MMOL/L (ref 22–29)
COLOR UR: YELLOW
CREAT SERPL-MCNC: 1.22 MG/DL (ref 0.57–1)
D-LACTATE SERPL-SCNC: 1 MMOL/L (ref 0.5–2)
DEPRECATED RDW RBC AUTO: 44.2 FL (ref 37–54)
EGFRCR SERPLBLD CKD-EPI 2021: 47.2 ML/MIN/1.73
EOSINOPHIL # BLD MANUAL: 0.11 10*3/MM3 (ref 0–0.4)
EOSINOPHIL NFR BLD MANUAL: 1 % (ref 0.3–6.2)
ERYTHROCYTE [DISTWIDTH] IN BLOOD BY AUTOMATED COUNT: 13 % (ref 12.3–15.4)
FLUAV SUBTYP SPEC NAA+PROBE: NOT DETECTED
FLUBV RNA ISLT QL NAA+PROBE: NOT DETECTED
GLOBULIN UR ELPH-MCNC: 3 GM/DL
GLUCOSE SERPL-MCNC: 118 MG/DL (ref 65–99)
GLUCOSE UR STRIP-MCNC: NEGATIVE MG/DL
HADV DNA SPEC NAA+PROBE: NOT DETECTED
HCOV 229E RNA SPEC QL NAA+PROBE: NOT DETECTED
HCOV HKU1 RNA SPEC QL NAA+PROBE: NOT DETECTED
HCOV NL63 RNA SPEC QL NAA+PROBE: NOT DETECTED
HCOV OC43 RNA SPEC QL NAA+PROBE: NOT DETECTED
HCT VFR BLD AUTO: 31.4 % (ref 34–46.6)
HGB BLD-MCNC: 10.6 G/DL (ref 12–15.9)
HGB UR QL STRIP.AUTO: NEGATIVE
HMPV RNA NPH QL NAA+NON-PROBE: NOT DETECTED
HOLD SPECIMEN: NORMAL
HPIV1 RNA ISLT QL NAA+PROBE: NOT DETECTED
HPIV2 RNA SPEC QL NAA+PROBE: NOT DETECTED
HPIV3 RNA NPH QL NAA+PROBE: NOT DETECTED
HPIV4 P GENE NPH QL NAA+PROBE: NOT DETECTED
KETONES UR QL STRIP: NEGATIVE
LEUKOCYTE ESTERASE UR QL STRIP.AUTO: NEGATIVE
LYMPHOCYTES # BLD MANUAL: 1.09 10*3/MM3 (ref 0.7–3.1)
LYMPHOCYTES NFR BLD MANUAL: 4 % (ref 5–12)
M PNEUMO IGG SER IA-ACNC: NOT DETECTED
MCH RBC QN AUTO: 31.4 PG (ref 26.6–33)
MCHC RBC AUTO-ENTMCNC: 33.8 G/DL (ref 31.5–35.7)
MCV RBC AUTO: 92.9 FL (ref 79–97)
MONOCYTES # BLD: 0.44 10*3/MM3 (ref 0.1–0.9)
NEUTROPHILS # BLD AUTO: 9.29 10*3/MM3 (ref 1.7–7)
NEUTROPHILS NFR BLD MANUAL: 85 % (ref 42.7–76)
NITRITE UR QL STRIP: NEGATIVE
NRBC BLD AUTO-RTO: 0 /100 WBC (ref 0–0.2)
PH UR STRIP.AUTO: 6 [PH] (ref 5–8)
PLAT MORPH BLD: NORMAL
PLATELET # BLD AUTO: 249 10*3/MM3 (ref 140–450)
PMV BLD AUTO: 9.3 FL (ref 6–12)
POTASSIUM SERPL-SCNC: 3.2 MMOL/L (ref 3.5–5.2)
PROCALCITONIN SERPL-MCNC: 0.54 NG/ML (ref 0–0.25)
PROT SERPL-MCNC: 5.9 G/DL (ref 6–8.5)
PROT UR QL STRIP: NEGATIVE
RBC # BLD AUTO: 3.38 10*6/MM3 (ref 3.77–5.28)
RHINOVIRUS RNA SPEC NAA+PROBE: NOT DETECTED
RSV RNA NPH QL NAA+NON-PROBE: NOT DETECTED
SARS-COV-2 RNA NPH QL NAA+NON-PROBE: NOT DETECTED
SODIUM SERPL-SCNC: 141 MMOL/L (ref 136–145)
SP GR UR STRIP: 1.01 (ref 1–1.03)
UROBILINOGEN UR QL STRIP: NORMAL
VARIANT LYMPHS NFR BLD MANUAL: 10 % (ref 19.6–45.3)
WBC MORPH BLD: NORMAL
WBC NRBC COR # BLD: 10.93 10*3/MM3 (ref 3.4–10.8)
WHOLE BLOOD HOLD COAG: NORMAL
WHOLE BLOOD HOLD SPECIMEN: NORMAL

## 2022-05-15 PROCEDURE — 0202U NFCT DS 22 TRGT SARS-COV-2: CPT | Performed by: EMERGENCY MEDICINE

## 2022-05-15 PROCEDURE — 99285 EMERGENCY DEPT VISIT HI MDM: CPT

## 2022-05-15 PROCEDURE — 81003 URINALYSIS AUTO W/O SCOPE: CPT | Performed by: EMERGENCY MEDICINE

## 2022-05-15 PROCEDURE — 96361 HYDRATE IV INFUSION ADD-ON: CPT

## 2022-05-15 PROCEDURE — 85652 RBC SED RATE AUTOMATED: CPT | Performed by: ORTHOPAEDIC SURGERY

## 2022-05-15 PROCEDURE — G0378 HOSPITAL OBSERVATION PER HR: HCPCS

## 2022-05-15 PROCEDURE — 85007 BL SMEAR W/DIFF WBC COUNT: CPT | Performed by: EMERGENCY MEDICINE

## 2022-05-15 PROCEDURE — 25010000002 SODIUM CHLORIDE 0.9 % WITH KCL 20 MEQ 20-0.9 MEQ/L-% SOLUTION: Performed by: HOSPITALIST

## 2022-05-15 PROCEDURE — 71045 X-RAY EXAM CHEST 1 VIEW: CPT

## 2022-05-15 PROCEDURE — 83605 ASSAY OF LACTIC ACID: CPT | Performed by: EMERGENCY MEDICINE

## 2022-05-15 PROCEDURE — 84145 PROCALCITONIN (PCT): CPT | Performed by: EMERGENCY MEDICINE

## 2022-05-15 PROCEDURE — 80053 COMPREHEN METABOLIC PANEL: CPT | Performed by: EMERGENCY MEDICINE

## 2022-05-15 PROCEDURE — 86140 C-REACTIVE PROTEIN: CPT | Performed by: ORTHOPAEDIC SURGERY

## 2022-05-15 PROCEDURE — 85025 COMPLETE CBC W/AUTO DIFF WBC: CPT | Performed by: EMERGENCY MEDICINE

## 2022-05-15 PROCEDURE — 87040 BLOOD CULTURE FOR BACTERIA: CPT | Performed by: EMERGENCY MEDICINE

## 2022-05-15 PROCEDURE — 96360 HYDRATION IV INFUSION INIT: CPT

## 2022-05-15 RX ORDER — ONDANSETRON 4 MG/1
4 TABLET, FILM COATED ORAL EVERY 6 HOURS PRN
Status: DISCONTINUED | OUTPATIENT
Start: 2022-05-15 | End: 2022-05-17 | Stop reason: HOSPADM

## 2022-05-15 RX ORDER — LAMOTRIGINE 100 MG/1
100 TABLET ORAL EVERY 12 HOURS SCHEDULED
Status: DISCONTINUED | OUTPATIENT
Start: 2022-05-15 | End: 2022-05-17 | Stop reason: HOSPADM

## 2022-05-15 RX ORDER — SODIUM CHLORIDE AND POTASSIUM CHLORIDE 150; 900 MG/100ML; MG/100ML
100 INJECTION, SOLUTION INTRAVENOUS CONTINUOUS
Status: DISCONTINUED | OUTPATIENT
Start: 2022-05-15 | End: 2022-05-17 | Stop reason: HOSPADM

## 2022-05-15 RX ORDER — ZONISAMIDE 100 MG/1
200 CAPSULE ORAL NIGHTLY
Status: DISCONTINUED | OUTPATIENT
Start: 2022-05-15 | End: 2022-05-17 | Stop reason: HOSPADM

## 2022-05-15 RX ORDER — PROCHLORPERAZINE MALEATE 5 MG/1
5 TABLET ORAL EVERY 6 HOURS PRN
Status: DISCONTINUED | OUTPATIENT
Start: 2022-05-15 | End: 2022-05-17 | Stop reason: HOSPADM

## 2022-05-15 RX ORDER — ROSUVASTATIN CALCIUM 10 MG/1
10 TABLET, COATED ORAL NIGHTLY
Status: DISCONTINUED | OUTPATIENT
Start: 2022-05-15 | End: 2022-05-17 | Stop reason: HOSPADM

## 2022-05-15 RX ORDER — ONDANSETRON 2 MG/ML
4 INJECTION INTRAMUSCULAR; INTRAVENOUS EVERY 6 HOURS PRN
Status: DISCONTINUED | OUTPATIENT
Start: 2022-05-15 | End: 2022-05-17 | Stop reason: HOSPADM

## 2022-05-15 RX ORDER — SODIUM CHLORIDE 0.9 % (FLUSH) 0.9 %
10 SYRINGE (ML) INJECTION EVERY 12 HOURS SCHEDULED
Status: DISCONTINUED | OUTPATIENT
Start: 2022-05-15 | End: 2022-05-17 | Stop reason: HOSPADM

## 2022-05-15 RX ORDER — ACETAMINOPHEN 325 MG/1
650 TABLET ORAL EVERY 6 HOURS PRN
Status: DISCONTINUED | OUTPATIENT
Start: 2022-05-15 | End: 2022-05-17 | Stop reason: HOSPADM

## 2022-05-15 RX ORDER — SODIUM CHLORIDE 0.9 % (FLUSH) 0.9 %
10 SYRINGE (ML) INJECTION AS NEEDED
Status: DISCONTINUED | OUTPATIENT
Start: 2022-05-15 | End: 2022-05-17 | Stop reason: HOSPADM

## 2022-05-15 RX ORDER — DOCUSATE SODIUM 100 MG/1
100 CAPSULE, LIQUID FILLED ORAL 2 TIMES DAILY
Refills: 1 | Status: DISCONTINUED | OUTPATIENT
Start: 2022-05-15 | End: 2022-05-17 | Stop reason: HOSPADM

## 2022-05-15 RX ORDER — ENOXAPARIN SODIUM 100 MG/ML
40 INJECTION SUBCUTANEOUS EVERY 24 HOURS
Status: DISCONTINUED | OUTPATIENT
Start: 2022-05-16 | End: 2022-05-17 | Stop reason: HOSPADM

## 2022-05-15 RX ORDER — LEVETIRACETAM 500 MG/1
1500 TABLET ORAL 2 TIMES DAILY
Status: DISCONTINUED | OUTPATIENT
Start: 2022-05-15 | End: 2022-05-17 | Stop reason: HOSPADM

## 2022-05-15 RX ORDER — SERTRALINE HYDROCHLORIDE 100 MG/1
100 TABLET, FILM COATED ORAL NIGHTLY
Status: DISCONTINUED | OUTPATIENT
Start: 2022-05-15 | End: 2022-05-17 | Stop reason: HOSPADM

## 2022-05-15 RX ORDER — LEVOTHYROXINE SODIUM 112 UG/1
112 TABLET ORAL
Status: DISCONTINUED | OUTPATIENT
Start: 2022-05-16 | End: 2022-05-17 | Stop reason: HOSPADM

## 2022-05-15 RX ORDER — HYDROCODONE BITARTRATE AND ACETAMINOPHEN 5; 325 MG/1; MG/1
1 TABLET ORAL EVERY 6 HOURS PRN
Status: DISCONTINUED | OUTPATIENT
Start: 2022-05-15 | End: 2022-05-15

## 2022-05-15 RX ORDER — AZELASTINE 1 MG/ML
2 SPRAY, METERED NASAL 2 TIMES DAILY
Status: DISCONTINUED | OUTPATIENT
Start: 2022-05-15 | End: 2022-05-17 | Stop reason: HOSPADM

## 2022-05-15 RX ORDER — CLONAZEPAM 0.5 MG/1
0.5 TABLET ORAL 3 TIMES DAILY
Status: DISCONTINUED | OUTPATIENT
Start: 2022-05-15 | End: 2022-05-17 | Stop reason: HOSPADM

## 2022-05-15 RX ORDER — ATENOLOL 50 MG/1
50 TABLET ORAL NIGHTLY
Status: DISCONTINUED | OUTPATIENT
Start: 2022-05-15 | End: 2022-05-17 | Stop reason: HOSPADM

## 2022-05-15 RX ORDER — HYDROCODONE BITARTRATE AND ACETAMINOPHEN 5; 325 MG/1; MG/1
1 TABLET ORAL EVERY 6 HOURS PRN
Status: DISCONTINUED | OUTPATIENT
Start: 2022-05-15 | End: 2022-05-17 | Stop reason: HOSPADM

## 2022-05-15 RX ORDER — ACETAMINOPHEN 500 MG
500 TABLET ORAL 4 TIMES DAILY
Status: DISCONTINUED | OUTPATIENT
Start: 2022-05-15 | End: 2022-05-17 | Stop reason: HOSPADM

## 2022-05-15 RX ADMIN — SERTRALINE 100 MG: 100 TABLET, FILM COATED ORAL at 20:45

## 2022-05-15 RX ADMIN — ROSUVASTATIN CALCIUM 10 MG: 10 TABLET, FILM COATED ORAL at 20:45

## 2022-05-15 RX ADMIN — ZONISAMIDE 200 MG: 100 CAPSULE ORAL at 20:45

## 2022-05-15 RX ADMIN — POTASSIUM CHLORIDE AND SODIUM CHLORIDE 100 ML/HR: 900; 150 INJECTION, SOLUTION INTRAVENOUS at 18:51

## 2022-05-15 RX ADMIN — SODIUM CHLORIDE, POTASSIUM CHLORIDE, SODIUM LACTATE AND CALCIUM CHLORIDE 500 ML: 600; 310; 30; 20 INJECTION, SOLUTION INTRAVENOUS at 12:20

## 2022-05-15 RX ADMIN — ATENOLOL 50 MG: 50 TABLET ORAL at 20:45

## 2022-05-15 RX ADMIN — LAMOTRIGINE 100 MG: 100 TABLET ORAL at 20:45

## 2022-05-15 RX ADMIN — Medication 10 ML: at 20:47

## 2022-05-15 RX ADMIN — ACETAMINOPHEN 500 MG: 500 TABLET ORAL at 20:44

## 2022-05-15 RX ADMIN — CLONAZEPAM 0.5 MG: 0.5 TABLET ORAL at 20:45

## 2022-05-15 RX ADMIN — DOCUSATE SODIUM 100 MG: 100 CAPSULE, LIQUID FILLED ORAL at 20:45

## 2022-05-15 RX ADMIN — AZELASTINE HYDROCHLORIDE 2 SPRAY: 137 SPRAY, METERED NASAL at 20:47

## 2022-05-15 RX ADMIN — LEVETIRACETAM 1500 MG: 500 TABLET, FILM COATED ORAL at 20:45

## 2022-05-15 NOTE — ED PROVIDER NOTES
" EMERGENCY DEPARTMENT ENCOUNTER    Room Number:  23/23  Date of encounter:  5/15/2022  PCP: Verito Pulido MD  Historian: Patient, son    I used full protective equipment while examining this patient.  This includes face mask, gloves and protective eyewear.  I washed my hands before entering the room and immediately upon leaving the room.  Patient was wearing a surgical mask.      HPI:  Chief Complaint: Fever  A complete HPI/ROS/PMH/PSH/SH/FH are unobtainable due to: Confusion    Context: Geraldine Holloway is a 71 y.o. female who presents to the ED from home by EMS with reports of fever, confusion, and dysuria.  Patient had a right knee replacement done several days ago.  Glucose was 114 per EMS.  O2 sat was 90% on room air and the patient was placed on 2 L of oxygen.  Patient denies fever, chills, sore throat, runny nose, cough, shortness of breath, chest pain, abdominal pain, vomiting, diarrhea, headache, or dysuria.  When the patient is asked why she is in the emergency department, she states \"I do not know\".  History is limited secondary to confusion      PAST MEDICAL HISTORY  Active Ambulatory Problems     Diagnosis Date Noted   • Precordial pain 03/13/2017   • Essential hypertension 03/13/2017   • Mixed hyperlipidemia 03/13/2017   • Family history of early CAD 03/13/2017   • Uterovaginal prolapse, incomplete 04/27/2020   • Uterovaginal prolapse 04/27/2020   • Cystocele, midline 06/01/2020   • Cystocele, lateral 06/01/2020   • Vaginal enterocele 06/01/2020   • Rectocele 06/01/2020   • Loss of perineal body, female 06/01/2020   • Female stress incontinence 06/01/2020   • Incompetence or weakening of pubocervical tissue 06/01/2020   • Incompetence or weakening of rectovaginal tissue 06/01/2020   • Disease of thyroid gland 06/02/2020   • Primary osteoarthritis of right knee 05/10/2022     Resolved Ambulatory Problems     Diagnosis Date Noted   • No Resolved Ambulatory Problems     Past Medical History: "   Diagnosis Date   • Anxiety    • Arthritis    • ASCUS of cervix with negative high risk HPV 06/04/2008   • At risk for sleep apnea    • AVM (arteriovenous malformation) brain    • Depression    • Diverticulitis    • Diverticulitis    • History of seizures    • Hyperlipidemia    • Hypertension    • Memory disorder    • Menopause    • Ovarian cyst    • Skin cancer    • Stroke (HCC) 2009   • Urinary dribbling    • Urinary incontinence    • Uterine prolapse          PAST SURGICAL HISTORY  Past Surgical History:   Procedure Laterality Date   • ANTERIOR AND POSTERIOR VAGINAL REPAIR N/A 06/02/2020    Procedure: Pubovaginal sling Posterior colporrhaphy  Insertion of vaginal grafts Cystourethroscopy;  Surgeon: Geraldine Muñoz MD;  Location: [facility];  Service: Gynecology;  Laterality: N/A;   • BREAST CYST EXCISION Left 1999   • CATARACT EXTRACTION EXTRACAPSULAR W/ INTRAOCULAR LENS IMPLANTATION Bilateral    • COLONOSCOPY     • CRANIOTOMY     • ECTOPIC PREGNANCY  12/1975    [facility]/ DR. MONTEZ.   • TOTAL KNEE ARTHROPLASTY Right 5/10/2022    Procedure: TOTAL KNEE ARTHROPLASTY;  Surgeon: Jean Burroughs MD;  Location: [facility];  Service: Orthopedics;  Laterality: Right;   • TOTAL LAPAROSCOPIC HYSTERECTOMY, SACROCOLPOPEXY N/A 06/02/2020    Procedure: Laparoscopic uterosacral ligament colpopexy sacral colpopexy  Laparoscopic paravaginal repair Laparoscopic hysterectomy with bilateral salpingectomy Laparoscopic abdominal enterocele repair;  Surgeon: Geraldine Muñoz MD;  Location: [facility];  Service: Gynecology;  Laterality: N/A;   • [facility] Right 2005    [facility]         FAMILY HISTORY  Family History   Problem Relation Age of Onset   • Emphysema Mother 65   • Rheum arthritis Mother    • Lymphoma Father 79   • Multiple myeloma Sister 41   • Lymphoma Sister 41   • Aneurysm Brother    • Bipolar disorder Son    • Heart attack Sister 41   • Malig Hyperthermia  Neg Hx          SOCIAL HISTORY  Social History     Socioeconomic History   • Marital status:      Spouse name: KEEGAN   • Number of children: 2   Tobacco Use   • Smoking status: Never Smoker   • Smokeless tobacco: Never Used   Vaping Use   • Vaping Use: Never used   Substance and Sexual Activity   • Alcohol use: Not Currently   • Drug use: Never         ALLERGIES  Iodine and Phenytoin       REVIEW OF SYSTEMS  Review of Systems   Limited secondary to confusion    PHYSICAL EXAM    I have reviewed the triage vital signs and nursing notes.    ED Triage Vitals [05/15/22 1031]   Temp Heart Rate Resp BP SpO2   (!) 100.6 °F (38.1 °C) 87 16 139/64 94 %      Temp src Heart Rate Source Patient Position BP Location FiO2 (%)   Axillary -- -- -- --       Physical Exam  GENERAL: Awake, alert.  Oriented to self, place, and month but not to date of the week.  HENT: NCAT, nares patent, moist mucous membranes, oropharynx is benign  NECK: supple, no meningismus  EYES: Extraocular muscles intact, no scleral icterus  CV: regular rhythm, regular rate  RESPIRATORY: normal effort, clear to auscultation bilaterally  ABDOMEN: soft, nontender  MUSCULOSKELETAL: Extremities are nontender and without obvious deformity.  There is intact dressing over the anterior right knee.  There is no surrounding erythema.  NEURO: Speech is normal.  No facial droop.  Follows commands.  Moves all extremities.  SKIN: warm, dry, no rash  PSYCH: Normal mood and affect      LAB RESULTS  Recent Results (from the past 24 hour(s))   Urinalysis With Microscopic If Indicated (No Culture) - Urine, Clean Catch    Collection Time: 05/15/22 10:52 AM    Specimen: Urine, Clean Catch   Result Value Ref Range    Color, UA Yellow Yellow, Straw    Appearance, UA Clear Clear    pH, UA 6.0 5.0 - 8.0    Specific Gravity, UA 1.007 1.005 - 1.030    Glucose, UA Negative Negative    Ketones, UA Negative Negative    Bilirubin, UA Negative Negative    Blood, UA Negative Negative     Protein, UA Negative Negative    Leuk Esterase, UA Negative Negative    Nitrite, UA Negative Negative    Urobilinogen, UA 0.2 E.U./dL 0.2 - 1.0 E.U./dL   Green Top (Gel)    Collection Time: 05/15/22 10:52 AM   Result Value Ref Range    Extra Tube Hold for add-ons.    Lavender Top    Collection Time: 05/15/22 10:52 AM   Result Value Ref Range    Extra Tube hold for add-on    Light Blue Top    Collection Time: 05/15/22 10:52 AM   Result Value Ref Range    Extra Tube Hold for add-ons.    Comprehensive Metabolic Panel    Collection Time: 05/15/22 10:52 AM    Specimen: Blood   Result Value Ref Range    Glucose 118 (H) 65 - 99 mg/dL    BUN 26 (H) 8 - 23 mg/dL    Creatinine 1.22 (H) 0.57 - 1.00 mg/dL    Sodium 141 136 - 145 mmol/L    Potassium 3.2 (L) 3.5 - 5.2 mmol/L    Chloride 106 98 - 107 mmol/L    CO2 25.0 22.0 - 29.0 mmol/L    Calcium 8.7 8.6 - 10.5 mg/dL    Total Protein 5.9 (L) 6.0 - 8.5 g/dL    Albumin 2.90 (L) 3.50 - 5.20 g/dL    ALT (SGPT) 12 1 - 33 U/L    AST (SGOT) 19 1 - 32 U/L    Alkaline Phosphatase 125 (H) 39 - 117 U/L    Total Bilirubin 1.2 0.0 - 1.2 mg/dL    Globulin 3.0 gm/dL    A/G Ratio 1.0 g/dL    BUN/Creatinine Ratio 21.3 7.0 - 25.0    Anion Gap 10.0 5.0 - 15.0 mmol/L    eGFR 47.2 (L) >60.0 mL/min/1.73   Procalcitonin    Collection Time: 05/15/22 10:52 AM    Specimen: Blood   Result Value Ref Range    Procalcitonin 0.54 (H) 0.00 - 0.25 ng/mL   CBC Auto Differential    Collection Time: 05/15/22 10:52 AM    Specimen: Blood   Result Value Ref Range    WBC 10.93 (H) 3.40 - 10.80 10*3/mm3    RBC 3.38 (L) 3.77 - 5.28 10*6/mm3    Hemoglobin 10.6 (L) 12.0 - 15.9 g/dL    Hematocrit 31.4 (L) 34.0 - 46.6 %    MCV 92.9 79.0 - 97.0 fL    MCH 31.4 26.6 - 33.0 pg    MCHC 33.8 31.5 - 35.7 g/dL    RDW 13.0 12.3 - 15.4 %    RDW-SD 44.2 37.0 - 54.0 fl    MPV 9.3 6.0 - 12.0 fL    Platelets 249 140 - 450 10*3/mm3    nRBC 0.0 0.0 - 0.2 /100 WBC   Manual Differential    Collection Time: 05/15/22 10:52 AM    Specimen:  Blood   Result Value Ref Range    Neutrophil % 85.0 (H) 42.7 - 76.0 %    Lymphocyte % 10.0 (L) 19.6 - 45.3 %    Monocyte % 4.0 (L) 5.0 - 12.0 %    Eosinophil % 1.0 0.3 - 6.2 %    Neutrophils Absolute 9.29 (H) 1.70 - 7.00 10*3/mm3    Lymphocytes Absolute 1.09 0.70 - 3.10 10*3/mm3    Monocytes Absolute 0.44 0.10 - 0.90 10*3/mm3    Eosinophils Absolute 0.11 0.00 - 0.40 10*3/mm3    Anisocytosis Mod/2+ None Seen    WBC Morphology Normal Normal    Platelet Morphology Normal Normal   Lactic Acid, Plasma    Collection Time: 05/15/22 11:32 AM    Specimen: Blood   Result Value Ref Range    Lactate 1.0 0.5 - 2.0 mmol/L   Respiratory Panel PCR w/COVID-19(SARS-CoV-2) ALONZO/VIDHYA/SAVANA/PAD/COR/MAD/SHIKHA In-House, NP Swab in UTM/[facility], 3-4 HR TAT - Swab, Nasopharynx    Collection Time: 05/15/22 11:32 AM    Specimen: Nasopharynx; Swab   Result Value Ref Range    ADENOVIRUS, PCR Not Detected Not Detected    Coronavirus 229E Not Detected Not Detected    Coronavirus HKU1 Not Detected Not Detected    Coronavirus NL63 Not Detected Not Detected    Coronavirus OC43 Not Detected Not Detected    COVID19 Not Detected Not Detected - Ref. Range    Human Metapneumovirus Not Detected Not Detected    Human Rhinovirus/Enterovirus Not Detected Not Detected    Influenza A PCR Not Detected Not Detected    Influenza B PCR Not Detected Not Detected    Parainfluenza Virus 1 Not Detected Not Detected    Parainfluenza Virus 2 Not Detected Not Detected    Parainfluenza Virus 3 Not Detected Not Detected    Parainfluenza Virus 4 Not Detected Not Detected    RSV, PCR Not Detected Not Detected    Bordetella pertussis pcr Not Detected Not Detected    Bordetella parapertussis PCR Not Detected Not Detected    Chlamydophila pneumoniae PCR Not Detected Not Detected    Mycoplasma pneumo by PCR Not Detected Not Detected       Ordered the above labs and independently reviewed the results.      RADIOLOGY  XR Chest AP    Result Date: 5/15/2022  PORTABLE CHEST 05/15/2022 AT 1140  HOURS  CLINICAL HISTORY: Cough. Fever.  Compared to the previous chest dated 04/26/2022.  The lungs are well-expanded and appear free of focal infiltrates. There are no pleural effusions. The heart is normal in size. The thoracic aorta is mildly tortuous.  IMPRESSIONS: No evidence of active disease within the chest.  This report was finalized on 5/15/2022 12:03 PM by Dr. Cam Krueger M.D.        I ordered the above noted radiological studies. Reviewed by me and discussed with radiologist.  See dictation for official radiology interpretation.      PROCEDURES  Procedures      MEDICATIONS GIVEN IN ER    Medications   sodium chloride 0.9 % flush 10 mL (has no administration in time range)   lactated ringers bolus 500 mL (0 mL Intravenous Stopped 5/15/22 1250)         PROGRESS, DATA ANALYSIS, CONSULTS, AND MEDICAL DECISION MAKING    All labs have been independently reviewed by me.  All radiology studies have been reviewed by me and discussed with radiologist dictating the report.   EKG's independently viewed and interpreted by me.  I have reviewed the nurse's notes, vital signs, past medical history, and medication list.  Discussion below represents my analysis of pertinent findings related to patient's condition, differential diagnosis, treatment plan and final disposition.      ED Course as of 05/15/22 1540   Sun May 15, 2022   1112 Old records reviewed.  Patient underwent a right knee replacement on 5/10/2022. [WH]   1121 O2 sats are currently 91 to 95% on room air [WH]   1159 WBC(!): 10.93 [WH]   1159 Procalcitonin(!): 0.54 [WH]   1159 Creatinine(!): 1.22  0.94 four days ago [WH]   1209 Chest x-ray is negative acute. [WH]   1311 Test results discussed with the patient and her son, who is now at bedside.  Son reports that the patient had a temperature of 100.7 this morning.  Her urine was dark and she seemed confused.  She has been taking Percocet regularly.  He feels like her mental status has improved but is not  completely back to normal yet.  Recommendations for admission were discussed with them.  Call will be placed to the hospitalist. [WH]   1352 Case discussed with Dr. Brito and he agrees to admit the patient to a monitored bed.  Pertinent history, exam findings, test results, ED course, and diagnoses were discussed with him. [WH]   1404 Patient presented the ED with a fever and mild confusion.  She recently had knee surgery.  Knee did not appear infected.  White blood cell count and creatinine were mildly elevated.  She did appear somewhat dry.  Chest x-ray, UA, and respiratory panel were negative.  O2 sats were ranging from 90 to 95% on room air.  Patient was disoriented to day.  Neuro exam was otherwise nonfocal.  Symptoms are likely multifactorial and due to postop atelectasis, use of narcotic pain medication, and mild dehydration.  Case was discussed with the hospitalist and she will be admitted. [WH]      ED Course User Index  [WH] José Miguel Tong MD       AS OF 15:40 EDT VITALS:    BP - 115/67  HR - 68  TEMP - 98.7 °F (37.1 °C) (Oral)  O2 SATS - 92%      DIAGNOSIS  Final diagnoses:   Postoperative fever   Confusion   Mild dehydration   Acute renal insufficiency         DISPOSITION  ADMISSION    Discussed treatment plan and reason for admission with pt/family and admitting physician.  Pt/family voiced understanding of the plan for admission for further testing/treatment as needed.         Dictated utilizing Dragon dictation:  Much of this encounter note is an electronic transcription/translation of spoken language to printed text. The electronic translation of spoken language may permit erroneous, or at times, nonsensical words or phrases to be inadvertently transcribed; Although I have reviewed the note for such errors, some may still exist.     José Miguel Tong MD  05/15/22 4477

## 2022-05-15 NOTE — H&P
HISTORY AND PHYSICAL   [facility]        Date of Admission: 5/15/2022  Patient Identification:  Name: Geraldine Holloway  Age: 71 y.o.  Sex: female  :  1950  MRN: [id number]                     Primary Care Physician: Verito Pulido MD    Chief Complaint: Confusion    History of Present Illness:   Mrs. Holloway is a 71-year-old female who had recent total knee arthroplasty believe on Tuesday of this past week per Dr. Burroughs.  Patient has been quite sedentary over the last couple days.  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  The only new introduction of meds over the last week has been pain medication with Percocet of which she was formally opiate naïve.  She has been utilizing Percocet 5 mg on pretty much a every 4 as needed basis.  She had has had reduced oral intake and appetite.  Apparently she had had increased confusion and a lot of the history trying to be obtained in the ER was unable.  At this time of my exam at bedside she was able to answer all orientation questions correctly with insight.  She had no known knowledge of recent confusion.  There has been no issues with tonic-clonic type activity.  They have noticed increased swelling of her right lower extremity.  There is been reported low-grade fever and everything thus far has been around the 100 range.  She denies any productive cough.  As result of her decreased oral intake she is noted darkening of urine but there is been no reports of dysuria and urinalysis was unremarkable.  Orthopedics has utilize aspirin twice daily for DVT prophylaxis with previous discharge.  Patient denies any headache neck rigidity stiffness.    Past Medical History:  Past Medical History:   Diagnosis Date   • Anxiety    • Arthritis    • ASCUS of cervix with negative high risk HPV 2008   • At risk for sleep apnea     5   • AVM (arteriovenous malformation) brain     hx surgery   •  Depression    • Disease of thyroid gland    • Diverticulitis    • Diverticulitis    • History of seizures     LAST SEIZURE 5 YR AGO   • Hyperlipidemia    • Hypertension    • Memory disorder    • Menopause    • Ovarian cyst    • Skin cancer    • Stroke (HCC) 2009    DR. COVARRUBIAS/[facility].   • Urinary dribbling    • Urinary incontinence    • Uterine prolapse      Past Surgical History:  Past Surgical History:   Procedure Laterality Date   • ANTERIOR AND POSTERIOR VAGINAL REPAIR N/A 06/02/2020    Procedure: Pubovaginal sling Posterior colporrhaphy  Insertion of vaginal grafts Cystourethroscopy;  Surgeon: Geraldine Muñoz MD;  Location: [facility];  Service: Gynecology;  Laterality: N/A;   • BREAST CYST EXCISION Left 1999   • CATARACT EXTRACTION EXTRACAPSULAR W/ INTRAOCULAR LENS IMPLANTATION Bilateral    • COLONOSCOPY     • CRANIOTOMY     • ECTOPIC PREGNANCY  12/1975    [facility]/ DR. MONTEZ.   • TOTAL KNEE ARTHROPLASTY Right 5/10/2022    Procedure: TOTAL KNEE ARTHROPLASTY;  Surgeon: Jean Burroughs MD;  Location: [facility] OR;  Service: Orthopedics;  Laterality: Right;   • TOTAL LAPAROSCOPIC HYSTERECTOMY, SACROCOLPOPEXY N/A 06/02/2020    Procedure: Laparoscopic uterosacral ligament colpopexy sacral colpopexy  Laparoscopic paravaginal repair Laparoscopic hysterectomy with bilateral salpingectomy Laparoscopic abdominal enterocele repair;  Surgeon: Geraldine Muñoz MD;  Location: [facility];  Service: Gynecology;  Laterality: N/A;   • WRIST SURGERY Right 2005    PIN PLACED. DR[facility] LUCIAN ALMEIDA, [facility].      Home Meds:  Medications Prior to Admission   Medication Sig Dispense Refill Last Dose   • aspirin  MG tablet Take 1 tablet by mouth 2 (Two) Times a Day With Meals. 60 tablet 0 5/14/2022 at Unknown time   • atenolol (TENORMIN) 50 MG tablet Take 50 mg by mouth Every Night.   5/14/2022 at Unknown time   • azelastine (ASTELIN) 0.1 % nasal spray 2 sprays into the nostril(s) as  directed by provider 2 (Two) Times a Day. Use in each nostril as directed   Past Week at Unknown time   • Calcium Citrate-Vitamin D 250-200 MG-UNIT tablet Take 1 tablet by mouth Daily.   5/14/2022 at Unknown time   • clonazePAM (KlonoPIN) 0.5 MG tablet Take 0.5 mg by mouth 3 (Three) Times a Day.   5/14/2022 at Unknown time   • Cyanocobalamin (VITAMIN B-12 PO) Take 1,000 mcg by mouth Every Morning. HOLD PRIOR TO SURG   5/14/2022 at Unknown time   • diclofenac (VOLTAREN) 75 MG EC tablet Take 75 mg by mouth 2 (Two) Times a Day.   5/14/2022 at Unknown time   • docusate sodium (COLACE) 250 MG capsule Take 1 capsule by mouth 2 (Two) Times a Day As Needed for Constipation. 30 capsule 1 5/14/2022 at Unknown time   • lamoTRIgine (LaMICtal) 100 MG tablet Take 100 mg by mouth 2 (Two) Times a Day.   5/14/2022 at Unknown time   • levETIRAcetam (KEPPRA) 1000 MG tablet Take 1,500 mg by mouth 2 (Two) Times a Day.   5/14/2022 at Unknown time   • levothyroxine (SYNTHROID, LEVOTHROID) 112 MCG tablet Take 112 mcg by mouth Every Morning.   5/14/2022 at Unknown time   • losartan-hydrochlorothiazide (HYZAAR) 100-12.5 MG per tablet Take 1 tablet by mouth Every Morning.   5/14/2022 at Unknown time   • magnesium oxide (MAG-OX) 400 tablet tablet Take 400 mg by mouth Daily.   5/14/2022 at Unknown time   • Multiple Vitamin tablet Take 1 tablet by mouth Daily.   5/14/2022 at Unknown time   • Omega-3 Fatty Acids (FISH OIL PO) Take 1 capsule by mouth Every Morning. HOLD PRIOR TO SURG   5/14/2022 at Unknown time   • oxyCODONE-acetaminophen (PERCOCET) 5-325 MG per tablet Take 1-2 tablets by mouth Every 4 (Four) Hours As Needed (Pain). 60 tablet 0 5/15/2022 at Unknown time   • potassium chloride 10 MEQ CR tablet Take 1 tablet by mouth Daily. 30 tablet 1 5/14/2022 at Unknown time   • rosuvastatin (CRESTOR) 10 MG tablet Take 10 mg by mouth Every Night.   5/14/2022 at Unknown time   • sertraline (ZOLOFT) 100 MG tablet Take  by mouth. TAKES 50 MG AT AM   MG HS   5/14/2022 at Unknown time   • zonisamide (ZONEGRAN) 100 MG capsule Take 200 mg by mouth Every Night.   5/14/2022 at Unknown time   • prochlorperazine (COMPAZINE) 5 MG tablet Take 5 mg by mouth Every 6 (Six) Hours As Needed for Nausea or Vomiting.   Unknown at Unknown time   • ubrogepant (UBRELVY) 50 MG tablet Take 50 mg by mouth. 1 tab at start of migraine headache and may repeat   More than a month at Unknown time       Allergies:  Allergies   Allergen Reactions   • Iodine Rash   • Phenytoin Rash     Immunizations:  Immunization History   Administered Date(s) Administered   • COVID-19 (PFIZER) PURPLE CAP 03/02/2021, 03/23/2021, 12/17/2021   • Influenza TIV (IM) 11/19/2012   • Pneumococcal Conjugate 13-Valent (PCV13) 11/10/2016   • Pneumococcal Polysaccharide (PPSV23) 11/01/2008, 11/08/2017   • TD Preservative Free 01/10/2020     Social History:   Social History     Social History Narrative    GYN PATIENT SINCE 2001.     Social History     Socioeconomic History   • Marital status:      Spouse name: MACK   • Number of children: 2   Tobacco Use   • Smoking status: Never Smoker   • Smokeless tobacco: Never Used   Vaping Use   • Vaping Use: Never used   Substance and Sexual Activity   • Alcohol use: Not Currently   • Drug use: Never       Family History:  Family History   Problem Relation Age of Onset   • Emphysema Mother 65   • Rheum arthritis Mother    • Lymphoma Father 79   • Multiple myeloma Sister 41   • Lymphoma Sister 41   • Aneurysm Brother    • Bipolar disorder Son    • Heart attack Sister 41   • Malig Hyperthermia Neg Hx         Review of Systems  See history of present illness and past medical history.  Patient denies fever chills or night sweats.  She does not really admit to much of a cough or shortness of breath chest pain or palpitations.  She denies any nausea vomiting abdominal pain diarrhea.  Denies any dysuria but admits to decreased oral intake and darkening of urine.  Admits  to right leg pain involving her knee as well as swelling of that right lower extremity.  Denies any loss of consciousness or focal loss of function but did have very recent confusion that seems to be resolved.  Remainder of ROS is negative.    Objective:  T Max 24 hrs: Temp (24hrs), Av.1 °F (37.3 °C), Min:97.9 °F (36.6 °C), Max:100.6 °F (38.1 °C)    Vitals Ranges:   Temp:  [97.9 °F (36.6 °C)-100.6 °F (38.1 °C)] 97.9 °F (36.6 °C)  Heart Rate:  [68-87] 72  Resp:  [16-18] 18  BP: (115-148)/(62-68) 128/62      Exam:  /62 (BP Location: Right arm, Patient Position: Sitting)   Pulse 72   Temp 97.9 °F (36.6 °C) (Oral)   Resp 18   Wt 100 kg (220 lb 7.4 oz)   SpO2 96%   BMI 37.84 kg/m²     General Appearance:    Alert, cooperative, fluent speech, alert and oriented x3, affect was a little flat but otherwise conversational and pleasant, accompanied by daughter-in-law/RN at bedside   Head:    Normocephalic, without obvious abnormality, atraumatic without any signs of tenderness to palpation involving scalp face or neck   Eyes:    PERRL, conjunctivae/corneas clear, EOM's intact, both eyes   Ears:    Normal external ear canals, both ears   Nose:   Nares normal, septum midline, mucosa normal, no drainage    or sinus tenderness   Throat:   Lips, mucosa, and tongue normal   Neck:   Supple, no point tenderness or rigidity or JVD       Lungs:    Decreased bases otherwise to auscultation bilaterally, respirations unlabored   Chest Wall:    No tenderness or deformity    Heart:    Regular rate and rhythm, S1 and S2 normal   Abdomen:     Soft, nontender, bowel sounds active all four quadrants,     no masses   Extremities:  Postoperative right knee with incision clean and dry, right knee is more warm to the touch as opposed to the left, 1-2+ edematous changes noted distally with some chronic stasis changes but no active cellulitis   Pulses:   2+ and symmetric all extremities           Neurologic:   CNII-XII intact, no  obvious focal deficit      .    Data Review:  Labs in chart were reviewed.             Imaging Results (All)     Procedure Component Value Units Date/Time    XR Chest AP [[id number]] Collected: 05/15/22 1203     Updated: 05/15/22 1206    Narrative:      PORTABLE CHEST 05/15/2022 AT 1140 HOURS     CLINICAL HISTORY: Cough. Fever.     Compared to the previous chest dated 04/26/2022.     The lungs are well-expanded and appear free of focal infiltrates. There  are no pleural effusions. The heart is normal in size. The thoracic  aorta is mildly tortuous.     IMPRESSIONS: No evidence of active disease within the chest.     This report was finalized on 5/15/2022 12:03 PM by Dr. Cam Krueger M.D.               Assessment:  Active Hospital Problems    Diagnosis  POA   • **Metabolic encephalopathy [G93.41]  Unknown   • Postoperative fever [R50.82]  Yes   • Atelectasis [J98.11]  Unknown   • Dehydration [E86.0]  Unknown   • Hypokalemia [E87.6]  Unknown   • History of total knee arthroplasty [Z96.659]  Not Applicable      Resolved Hospital Problems   No resolved problems to display.       Plan:    Metabolic encephalopathy in an opiate naïve patient on Percocet ingesting on a pretty much every 4 as needed basis compounded by postoperative low-grade fever with recent right total knee arthroplasty per Dr. Burroughs   -I do not feel an active infection is present despite some of her lab abnormalities   -I favor low-grade fever secondary to atelectasis or possible DVT and have ordered I-S as well as increase activity with PT pending.  Patient has been quite labile and sedentary.  Given her recent arthroplasty I will asked Dr. Burroughs to evaluate her right knee.  It is slightly warmer to the touch but I anticipate that is all postoperative changes and I cannot appreciate any aspects of cellulitis nor is there been anything such as purulent drainage noted.   -Procalcitonin can be skewed by renal function.  Leukocytosis can be reactive.   Lactate is normal   -She is only on ASA for postoperative prophylaxis which makes the idea of the thrombosis more likely and I am going to check her lower extremity Doppler given her extensive edematous change on that right lower extremity which is most likely postoperative but we will rule out any aspect of DVT.  We will hold her aspirin and utilize Lovenox at this time for prophylaxis and adjust if Doppler positive   -IVF overnight to help with dehydration as well as hypokalemia   -Multiple other home meds could be contributory -Lamictal, Keppra, clonazepam, Zonegran, Zoloft and will continue these for now since the only new med has been pain med   -I have also placed a consult to neurology given her past history of seizure given some of her above acute issues as well as reassess some of the above medication dosing   -Add TSH to a.m. labs as none noted in epic -on levothyroxine   -Trend procalcitonin to see if it improves as renal function improves   -Blood cultures pending.  Chest x-ray and urine and respiratory panel negative   -PT to evaluate      Monitor CBC daily.  Mild aspects of postoperative acute blood loss anemia-Hgb 10.6    HTN stable -holding ARB/HCTZ for now as patient looks clinically dry with mild bump in creatinine and we can reassess tomorrow pending trends as well as a.m. blood work status post IVF    Further recommendations to follow as clinical course unfolds    Moreno Brito MD  5/15/2022  18:04 EDT

## 2022-05-15 NOTE — ED NOTES
Pt arrives from home via EMS with c/o AMS, fever, dysuria. Had knee replacement here last week.     Patient wearing mask during triage. RN wearing appropriate PPE during triage. Hand hygiene performed.

## 2022-05-15 NOTE — ED NOTES
Pt's son wishes to be updated on pt status. Number listed in chart. Wishes to be at bedside when pt cleared.

## 2022-05-15 NOTE — PLAN OF CARE
Goal Outcome Evaluation: Pt resting in bed with no signs of distress noted at this time. Pt arrived to unit at 1600 and was able to answer all orientation questions appropriately. VS stable. Bed in lowest position with siderails up X2. Call light within reach. RN will continue to monitor.                  Problem: Fall Injury Risk  Goal: Absence of Fall and Fall-Related Injury  Outcome: Met  Intervention: Identify and Manage Contributors  Recent Flowsheet Documentation  Taken 5/15/2022 1600 by Roslyn Choi RN  Medication Review/Management: medications reviewed  Intervention: Promote Injury-Free Environment  Recent Flowsheet Documentation  Taken 5/15/2022 1600 by Roslyn Choi RN  Safety Promotion/Fall Prevention:   activity supervised   assistive device/personal items within reach   clutter free environment maintained   fall prevention program maintained   nonskid shoes/slippers when out of bed   room organization consistent   safety round/check completed     Problem: Adult Inpatient Plan of Care  Goal: Absence of Hospital-Acquired Illness or Injury  Intervention: Identify and Manage Fall Risk  Recent Flowsheet Documentation  Taken 5/15/2022 1600 by Roslyn Choi RN  Safety Promotion/Fall Prevention:   activity supervised   assistive device/personal items within reach   clutter free environment maintained   fall prevention program maintained   nonskid shoes/slippers when out of bed   room organization consistent   safety round/check completed  Intervention: Prevent Skin Injury  Recent Flowsheet Documentation  Taken 5/15/2022 1600 by Roslyn Choi RN  Body Position: supine  Intervention: Prevent and Manage VTE (Venous Thromboembolism) Risk  Recent Flowsheet Documentation  Taken 5/15/2022 1600 by Roslyn Choi RN  Activity Management: activity adjusted per tolerance  VTE Prevention/Management:   left   dorsiflexion/plantar flexion performed  Intervention: Prevent  Infection  Recent Flowsheet Documentation  Taken 5/15/2022 1600 by Roslyn Choi, RN  Infection Prevention:   rest/sleep promoted   hand hygiene promoted  Goal: Optimal Comfort and Wellbeing  Intervention: Provide Person-Centered Care  Recent Flowsheet Documentation  Taken 5/15/2022 1600 by Roslyn Choi RN  Trust Relationship/Rapport:   care explained   choices provided   questions answered   questions encouraged   thoughts/feelings acknowledged  Goal: Readiness for Transition of Care  Intervention: Mutually Develop Transition Plan  Recent Flowsheet Documentation  Taken 5/15/2022 1628 by Roslyn Choi, RN  Transportation Anticipated: family or friend will provide  Patient/Family Anticipated Services at Transition:   Patient/Family Anticipates Transition to: home with family  Taken 5/15/2022 1625 by Roslyn Choi, RN  Equipment Currently Used at Home: walker, rolling

## 2022-05-16 ENCOUNTER — APPOINTMENT (OUTPATIENT)
Dept: CARDIOLOGY | Facility: HOSPITAL | Age: 72
End: 2022-05-16

## 2022-05-16 PROBLEM — Z79.899 POLYPHARMACY: Status: ACTIVE | Noted: 2022-05-16

## 2022-05-16 PROBLEM — N17.9 AKI (ACUTE KIDNEY INJURY): Status: ACTIVE | Noted: 2022-05-16

## 2022-05-16 PROBLEM — F13.20 BENZODIAZEPINE DEPENDENCE: Status: ACTIVE | Noted: 2022-05-16

## 2022-05-16 LAB
ALBUMIN SERPL-MCNC: 3 G/DL (ref 3.5–5.2)
ANION GAP SERPL CALCULATED.3IONS-SCNC: 8 MMOL/L (ref 5–15)
BH CV LOWER VASCULAR LEFT COMMON FEMORAL AUGMENT: NORMAL
BH CV LOWER VASCULAR LEFT COMMON FEMORAL COMPETENT: NORMAL
BH CV LOWER VASCULAR LEFT COMMON FEMORAL COMPRESS: NORMAL
BH CV LOWER VASCULAR LEFT COMMON FEMORAL PHASIC: NORMAL
BH CV LOWER VASCULAR LEFT COMMON FEMORAL SPONT: NORMAL
BH CV LOWER VASCULAR RIGHT COMMON FEMORAL AUGMENT: NORMAL
BH CV LOWER VASCULAR RIGHT COMMON FEMORAL COMPETENT: NORMAL
BH CV LOWER VASCULAR RIGHT COMMON FEMORAL COMPRESS: NORMAL
BH CV LOWER VASCULAR RIGHT COMMON FEMORAL PHASIC: NORMAL
BH CV LOWER VASCULAR RIGHT COMMON FEMORAL SPONT: NORMAL
BH CV LOWER VASCULAR RIGHT DISTAL FEMORAL COMPRESS: NORMAL
BH CV LOWER VASCULAR RIGHT GASTRONEMIUS COMPRESS: NORMAL
BH CV LOWER VASCULAR RIGHT GREATER SAPH AK COMPRESS: NORMAL
BH CV LOWER VASCULAR RIGHT GREATER SAPH BK COMPRESS: NORMAL
BH CV LOWER VASCULAR RIGHT LESSER SAPH COMPRESS: NORMAL
BH CV LOWER VASCULAR RIGHT MID FEMORAL AUGMENT: NORMAL
BH CV LOWER VASCULAR RIGHT MID FEMORAL COMPETENT: NORMAL
BH CV LOWER VASCULAR RIGHT MID FEMORAL COMPRESS: NORMAL
BH CV LOWER VASCULAR RIGHT MID FEMORAL PHASIC: NORMAL
BH CV LOWER VASCULAR RIGHT MID FEMORAL SPONT: NORMAL
BH CV LOWER VASCULAR RIGHT PERONEAL COMPRESS: NORMAL
BH CV LOWER VASCULAR RIGHT POPLITEAL AUGMENT: NORMAL
BH CV LOWER VASCULAR RIGHT POPLITEAL COMPETENT: NORMAL
BH CV LOWER VASCULAR RIGHT POPLITEAL COMPRESS: NORMAL
BH CV LOWER VASCULAR RIGHT POPLITEAL PHASIC: NORMAL
BH CV LOWER VASCULAR RIGHT POPLITEAL SPONT: NORMAL
BH CV LOWER VASCULAR RIGHT POSTERIOR TIBIAL COMPRESS: NORMAL
BH CV LOWER VASCULAR RIGHT PROFUNDA FEMORAL COMPRESS: NORMAL
BH CV LOWER VASCULAR RIGHT PROXIMAL FEMORAL COMPRESS: NORMAL
BH CV LOWER VASCULAR RIGHT SAPHENOFEMORAL JUNCTION COMPRESS: NORMAL
BH CV LOWER VASCULAR RIGHT SOLEAL COMPRESS: NORMAL
BUN SERPL-MCNC: 22 MG/DL (ref 8–23)
BUN/CREAT SERPL: 23.2 (ref 7–25)
CALCIUM SPEC-SCNC: 8.6 MG/DL (ref 8.6–10.5)
CHLORIDE SERPL-SCNC: 107 MMOL/L (ref 98–107)
CO2 SERPL-SCNC: 24 MMOL/L (ref 22–29)
CREAT SERPL-MCNC: 0.95 MG/DL (ref 0.57–1)
CRP SERPL-MCNC: 28.69 MG/DL (ref 0–0.5)
DEPRECATED RDW RBC AUTO: 43.9 FL (ref 37–54)
EGFRCR SERPLBLD CKD-EPI 2021: 63.8 ML/MIN/1.73
ERYTHROCYTE [DISTWIDTH] IN BLOOD BY AUTOMATED COUNT: 12.6 % (ref 12.3–15.4)
ERYTHROCYTE [SEDIMENTATION RATE] IN BLOOD: 13 MM/HR (ref 0–30)
GLUCOSE SERPL-MCNC: 102 MG/DL (ref 65–99)
HCT VFR BLD AUTO: 32.5 % (ref 34–46.6)
HGB BLD-MCNC: 10.8 G/DL (ref 12–15.9)
MAXIMAL PREDICTED HEART RATE: 148 BPM
MCH RBC QN AUTO: 31.6 PG (ref 26.6–33)
MCHC RBC AUTO-ENTMCNC: 33.2 G/DL (ref 31.5–35.7)
MCV RBC AUTO: 95 FL (ref 79–97)
PHOSPHATE SERPL-MCNC: 2 MG/DL (ref 2.5–4.5)
PLATELET # BLD AUTO: 237 10*3/MM3 (ref 140–450)
PMV BLD AUTO: 9 FL (ref 6–12)
POTASSIUM SERPL-SCNC: 3.4 MMOL/L (ref 3.5–5.2)
RBC # BLD AUTO: 3.42 10*6/MM3 (ref 3.77–5.28)
SODIUM SERPL-SCNC: 139 MMOL/L (ref 136–145)
STRESS TARGET HR: 126 BPM
TSH SERPL DL<=0.05 MIU/L-ACNC: 0.32 UIU/ML (ref 0.27–4.2)
WBC NRBC COR # BLD: 7.95 10*3/MM3 (ref 3.4–10.8)

## 2022-05-16 PROCEDURE — 84443 ASSAY THYROID STIM HORMONE: CPT | Performed by: HOSPITALIST

## 2022-05-16 PROCEDURE — 96372 THER/PROPH/DIAG INJ SC/IM: CPT

## 2022-05-16 PROCEDURE — 96361 HYDRATE IV INFUSION ADD-ON: CPT

## 2022-05-16 PROCEDURE — 99203 OFFICE O/P NEW LOW 30 MIN: CPT | Performed by: PSYCHIATRY & NEUROLOGY

## 2022-05-16 PROCEDURE — G0378 HOSPITAL OBSERVATION PER HR: HCPCS

## 2022-05-16 PROCEDURE — 25010000002 ENOXAPARIN PER 10 MG: Performed by: HOSPITALIST

## 2022-05-16 PROCEDURE — 93971 EXTREMITY STUDY: CPT

## 2022-05-16 PROCEDURE — 85027 COMPLETE CBC AUTOMATED: CPT | Performed by: HOSPITALIST

## 2022-05-16 PROCEDURE — 97162 PT EVAL MOD COMPLEX 30 MIN: CPT

## 2022-05-16 PROCEDURE — 25010000002 SODIUM CHLORIDE 0.9 % WITH KCL 20 MEQ 20-0.9 MEQ/L-% SOLUTION: Performed by: HOSPITALIST

## 2022-05-16 PROCEDURE — 97530 THERAPEUTIC ACTIVITIES: CPT

## 2022-05-16 PROCEDURE — 80069 RENAL FUNCTION PANEL: CPT | Performed by: HOSPITALIST

## 2022-05-16 RX ORDER — MAGNESIUM SULFATE HEPTAHYDRATE 40 MG/ML
2 INJECTION, SOLUTION INTRAVENOUS AS NEEDED
Status: DISCONTINUED | OUTPATIENT
Start: 2022-05-16 | End: 2022-05-17 | Stop reason: HOSPADM

## 2022-05-16 RX ORDER — MAGNESIUM SULFATE HEPTAHYDRATE 40 MG/ML
4 INJECTION, SOLUTION INTRAVENOUS AS NEEDED
Status: DISCONTINUED | OUTPATIENT
Start: 2022-05-16 | End: 2022-05-17 | Stop reason: HOSPADM

## 2022-05-16 RX ORDER — POTASSIUM CHLORIDE 750 MG/1
40 TABLET, FILM COATED, EXTENDED RELEASE ORAL AS NEEDED
Status: DISCONTINUED | OUTPATIENT
Start: 2022-05-16 | End: 2022-05-17 | Stop reason: HOSPADM

## 2022-05-16 RX ORDER — CHOLECALCIFEROL (VITAMIN D3) 125 MCG
5 CAPSULE ORAL NIGHTLY PRN
Status: DISCONTINUED | OUTPATIENT
Start: 2022-05-16 | End: 2022-05-17 | Stop reason: HOSPADM

## 2022-05-16 RX ORDER — POTASSIUM CHLORIDE 1.5 G/1.77G
40 POWDER, FOR SOLUTION ORAL AS NEEDED
Status: DISCONTINUED | OUTPATIENT
Start: 2022-05-16 | End: 2022-05-17 | Stop reason: HOSPADM

## 2022-05-16 RX ADMIN — LAMOTRIGINE 100 MG: 100 TABLET ORAL at 20:43

## 2022-05-16 RX ADMIN — DOCUSATE SODIUM 100 MG: 100 CAPSULE, LIQUID FILLED ORAL at 08:42

## 2022-05-16 RX ADMIN — ATENOLOL 50 MG: 50 TABLET ORAL at 20:43

## 2022-05-16 RX ADMIN — CLONAZEPAM 0.5 MG: 0.5 TABLET ORAL at 20:43

## 2022-05-16 RX ADMIN — POTASSIUM CHLORIDE AND SODIUM CHLORIDE 100 ML/HR: 900; 150 INJECTION, SOLUTION INTRAVENOUS at 04:55

## 2022-05-16 RX ADMIN — ACETAMINOPHEN 500 MG: 500 TABLET ORAL at 17:04

## 2022-05-16 RX ADMIN — LEVETIRACETAM 1500 MG: 500 TABLET, FILM COATED ORAL at 20:43

## 2022-05-16 RX ADMIN — DOCUSATE SODIUM 100 MG: 100 CAPSULE, LIQUID FILLED ORAL at 20:43

## 2022-05-16 RX ADMIN — ACETAMINOPHEN 500 MG: 500 TABLET ORAL at 13:19

## 2022-05-16 RX ADMIN — CLONAZEPAM 0.5 MG: 0.5 TABLET ORAL at 17:04

## 2022-05-16 RX ADMIN — AZELASTINE HYDROCHLORIDE 2 SPRAY: 137 SPRAY, METERED NASAL at 08:42

## 2022-05-16 RX ADMIN — ZONISAMIDE 200 MG: 100 CAPSULE ORAL at 20:44

## 2022-05-16 RX ADMIN — AZELASTINE HYDROCHLORIDE 2 SPRAY: 137 SPRAY, METERED NASAL at 20:44

## 2022-05-16 RX ADMIN — ROSUVASTATIN CALCIUM 10 MG: 10 TABLET, FILM COATED ORAL at 20:43

## 2022-05-16 RX ADMIN — Medication 10 ML: at 08:42

## 2022-05-16 RX ADMIN — CLONAZEPAM 0.5 MG: 0.5 TABLET ORAL at 08:42

## 2022-05-16 RX ADMIN — HYDROCODONE BITARTRATE AND ACETAMINOPHEN 1 TABLET: 5; 325 TABLET ORAL at 11:34

## 2022-05-16 RX ADMIN — ENOXAPARIN SODIUM 40 MG: 100 INJECTION SUBCUTANEOUS at 08:42

## 2022-05-16 RX ADMIN — Medication 10 ML: at 20:43

## 2022-05-16 RX ADMIN — SERTRALINE 100 MG: 100 TABLET, FILM COATED ORAL at 20:43

## 2022-05-16 RX ADMIN — LAMOTRIGINE 100 MG: 100 TABLET ORAL at 08:42

## 2022-05-16 RX ADMIN — LEVOTHYROXINE SODIUM 112 MCG: 0.11 TABLET ORAL at 05:10

## 2022-05-16 RX ADMIN — POTASSIUM CHLORIDE 40 MEQ: 10 TABLET, EXTENDED RELEASE ORAL at 14:17

## 2022-05-16 RX ADMIN — POTASSIUM CHLORIDE AND SODIUM CHLORIDE 100 ML/HR: 900; 150 INJECTION, SOLUTION INTRAVENOUS at 19:38

## 2022-05-16 RX ADMIN — LEVETIRACETAM 1500 MG: 500 TABLET, FILM COATED ORAL at 08:42

## 2022-05-16 RX ADMIN — ACETAMINOPHEN 500 MG: 500 TABLET ORAL at 08:42

## 2022-05-16 RX ADMIN — ACETAMINOPHEN 500 MG: 500 TABLET ORAL at 20:43

## 2022-05-16 NOTE — THERAPY EVALUATION
Patient Name: Geraldine Holloway  : 1950    MRN: [id number]                              Today's Date: 2022       Admit Date: 5/15/2022    Visit Dx:     ICD-10-CM ICD-9-CM   1. Postoperative fever  R50.82 780.62   2. Confusion  R41.0 298.9   3. Mild dehydration  E86.0 276.51   4. Acute renal insufficiency  N28.9 593.9     Patient Active Problem List   Diagnosis   • Precordial pain   • Essential hypertension   • Mixed hyperlipidemia   • Family history of early CAD   • Uterovaginal prolapse, incomplete   • Uterovaginal prolapse   • Cystocele, midline   • Cystocele, lateral   • Vaginal enterocele   • Rectocele   • Loss of perineal body, female   • Female stress incontinence   • Incompetence or weakening of pubocervical tissue   • Incompetence or weakening of rectovaginal tissue   • Disease of thyroid gland   • Primary osteoarthritis of right knee   • Postoperative fever   • Atelectasis   • Metabolic encephalopathy   • Dehydration   • Hypokalemia   • History of total knee arthroplasty   • TERESE (acute kidney injury) ([facility])   • Polypharmacy   • Benzodiazepine dependence (HCC)     Past Medical History:   Diagnosis Date   • Anxiety    • Arthritis    • ASCUS of cervix with negative high risk HPV 2008   • At risk for sleep apnea     5   • AVM (arteriovenous malformation) brain     hx surgery   • Depression    • Disease of thyroid gland    • Diverticulitis    • Diverticulitis    • History of seizures     LAST SEIZURE 5 YR AGO   • Hyperlipidemia    • Hypertension    • Memory disorder    • Menopause    • Ovarian cyst    • Skin cancer    • Stroke ([facility])     DR. COVARRUBIAS/[facility].   • Urinary dribbling    • Urinary incontinence    • Uterine prolapse      Past Surgical History:   Procedure Laterality Date   • ANTERIOR AND POSTERIOR VAGINAL REPAIR N/A 2020    Procedure: Pubovaginal sling Posterior colporrhaphy  Insertion of vaginal grafts Cystourethroscopy;  Surgeon: Geraldine Muñoz MD;   Location: [facility] OR;  Service: Gynecology;  Laterality: N/A;   • BREAST CYST EXCISION Left 1999   • CATARACT EXTRACTION EXTRACAPSULAR W/ INTRAOCULAR LENS IMPLANTATION Bilateral    • COLONOSCOPY     • CRANIOTOMY     • ECTOPIC PREGNANCY  12/1975    [facility]/ DR. MONTEZ.   • TOTAL KNEE ARTHROPLASTY Right 5/10/2022    Procedure: TOTAL KNEE ARTHROPLASTY;  Surgeon: Jean Burroughs MD;  Location: [facility] OR;  Service: Orthopedics;  Laterality: Right;   • TOTAL LAPAROSCOPIC HYSTERECTOMY, SACROCOLPOPEXY N/A 06/02/2020    Procedure: Laparoscopic uterosacral ligament colpopexy sacral colpopexy  Laparoscopic paravaginal repair Laparoscopic hysterectomy with bilateral salpingectomy Laparoscopic abdominal enterocele repair;  Surgeon: Geraldine Muñoz MD;  Location: [facility];  Service: Gynecology;  Laterality: N/A;   • WRIST [facility] Right 2005    [facility]      General Information     Row Name 05/16/22 1118          Physical Therapy Time and Intention    Document Type evaluation  -DB     Mode of Treatment individual therapy;physical therapy  -DB     Row Name 05/16/22 1118          General Information    Patient Profile Reviewed yes  -DB     Prior Level of Function independent:  -DB     Existing Precautions/Restrictions fall  recent R TKA  -DB     Barriers to Rehab none identified  -DB     Row Name 05/16/22 1118          Living Environment    People in Home alone  pt states friend has been staying with her since TKA  -DB     Row Name 05/16/22 1118          Home Main Entrance    Number of Stairs, Main Entrance two  -DB     Stair Railings, Main Entrance railings safe and in good condition  -DB     Row Name 05/16/22 1118          Stairs Within Home, Primary    Number of Stairs, Within Home, Primary none  -DB     Row Name 05/16/22 1118          Cognition    Orientation Status (Cognition) oriented x 4  -DB     Row Name 05/16/22 1118          Safety Issues, Functional Mobility     Impairments Affecting Function (Mobility) balance;strength;endurance/activity tolerance  -DB           User Key  (r) = Recorded By, (t) = Taken By, (c) = Cosigned By    Initials Name Provider Type    Vinita Weaver PT Physical Therapist               Mobility     Row Name 05/16/22 1124          Bed Mobility    Bed Mobility supine-sit;sit-supine  -DB     Supine-Sit [place] (Bed Mobility) standby assist;verbal cues  -DB     Sit-Supine [place]ie (Bed Mobility) minimum assist (75% patient effort);verbal cues  -DB     Assistive Device (Bed Mobility) bed rails;head of bed elevated  -DB     Comment, (Bed Mobility) inc'd time to make it to EOB with mod VC's for sequencing  -DB     Row Name 05/16/22 1124          Sit-Stand Transfer    Sit-Stand [place]ousie (Transfers) minimum assist (75% patient effort);verbal cues  -DB     Assistive Device (Sit-Stand Transfers) walker, front-wheeled  -DB     Row Name 05/16/22 1124          Gait/Stairs (Locomotion)    [place] Level (Gait) contact guard;verbal cues;nonverbal cues (demo/gesture)  -DB     Assistive Device (Gait) walker, front-wheeled  -DB     Distance in Feet (Gait) 30'  -DB     Deviations/Abnormal Patterns (Gait) dharmesh decreased;stride length decreased;gait speed decreased  -DB     Bilateral Gait Deviations forward flexed posture;heel strike decreased  -DB     Comment, (Gait/Stairs) gait limited d/t transport needing to take pt to vascular  -DB           User Key  (r) = Recorded By, (t) = Taken By, (c) = Cosigned By    Initials Name Provider Type    Vinita Weaver PT Physical Therapist               Obj/Interventions     Row Name 05/16/22 1126          Range of Motion Comprehensive    Comment, General Range of Motion post op R TKA on 5/10  -DB     Row Name 05/16/22 1126          Strength Comprehensive (MMT)    Comment, General Manual Muscle Testing (MMT) Assessment post op R TKA on 5/10  -DB     Row Name 05/16/22 1126          Balance    Balance  Assessment sitting static balance;sitting dynamic balance;standing static balance;standing dynamic balance  -DB     Static Sitting Balance standby assist  -DB     Dynamic Sitting Balance standby assist  -DB     Position, Sitting Balance sitting edge of bed;unsupported  -DB     Static Standing Balance contact guard  -DB     Dynamic Standing Balance contact guard  -DB     Position/Device Used, Standing Balance supported;walker, front-wheeled  -DB     Balance Interventions sitting;standing;sit to stand  -DB           User Key  (r) = Recorded By, (t) = Taken By, (c) = Cosigned By    Initials Name Provider Type    DB Vinita Delvalle, PT Physical Therapist               Goals/Plan     Row Name 05/16/22 1131          Bed Mobility Goal 1 (PT)    Activity/Assistive Device (Bed Mobility Goal 1, PT) bed mobility activities, all  -DB     [place] Level/Cues Needed (Bed Mobility Goal 1, PT) supervision required  -DB     Time Frame (Bed Mobility Goal 1, PT) 1 week  -DB     Row Name 05/16/22 1131          Transfer Goal 1 (PT)    Activity/Assistive Device (Transfer Goal 1, PT) transfers, all  -DB     [place] Level/Cues Needed (Transfer Goal 1, PT) supervision required  -DB     Time Frame (Transfer Goal 1, PT) 1 week  -DB     Row Name 05/16/22 1131          Gait Training Goal 1 (PT)    Activity/Assistive Device (Gait Training Goal 1, PT) gait (walking locomotion)  -DB     [place] Level (Gait Training Goal 1, PT) standby assist  -DB     Time Frame (Gait Training Goal 1, PT) 1 week  -DB     Row Name 05/16/22 1131          Stairs Goal 1 (PT)    Activity/Assistive Device (Stairs Goal 1, PT) stairs, all skills  -DB     [place] Level/Cues Needed (Stairs Goal 1, PT) contact guard required  -DB     Number of Stairs (Stairs Goal 1, PT) 2  -DB     Time Frame (Stairs Goal 1, PT) 1 week  -DB     Row Name 05/16/22 1131          Therapy Assessment/Plan (PT)    Planned Therapy Interventions (PT) balance training;bed mobility  training;gait training;home exercise program;neuromuscular re-education;patient/family education;postural re-education;transfer training;stair training;strengthening  -DB           User Key  (r) = Recorded By, (t) = Taken By, (c) = Cosigned By    Initials Name Provider Type    Vinita Weaver PT Physical Therapist               Clinical Impression     Row Name 05/16/22 1127          Pain    Pain Intervention(s) Ambulation/increased activity;Repositioned  -DB     Row Name 05/16/22 1127          Plan of Care Review    Plan of Care Reviewed With patient  -DB     Outcome Evaluation Pt is a 72 y/o F admitted to [facility] with recent confusion. Recent R TKA on 5/10, pt was home and states she was having a friend stay with her. States she has 2 HUGO, no stairs, and has been using RW. Pt today was A&O x 4, but seemed to have difficulty with answering questions about PLOF and difficulty with sequencing for getting OOB. Pt was SBA with inc'd time needed for sup<>sit, Mica for STS, and CGA to ambulate 30' with RW. Gait likmited d/t pt needing to be transported to vascular. Pt demo's dec'd strength, endurance, and balance and will benefit from skilled PT intervention. Rec D/C home with 24/7 assist and continue HH.  -DB     Row Name 05/16/22 1127          Therapy Assessment/Plan (PT)    Rehab Potential (PT) good, to achieve stated therapy goals  -DB     Criteria for Skilled Interventions Met (PT) yes  -DB     Therapy Frequency (PT) 6 times/wk  -DB     Row Name 05/16/22 1127          Vital Signs    O2 Delivery Pre Treatment room air  -DB     O2 Delivery Intra Treatment room air  -DB     O2 Delivery Post Treatment room air  -DB     Pre Patient Position Supine  -DB     Intra Patient Position Standing  -DB     Post Patient Position Supine  -DB     Row Name 05/16/22 1127          Positioning and Restraints    Pre-Treatment Position in bed  -DB     Post Treatment Position other  -DB     Other Position with other staff  on stretcher to  be transported  -DB           User Key  (r) = Recorded By, (t) = Taken By, (c) = Cosigned By    Initials Name Provider Type    DB Vinita Delvalle PT Physical Therapist               Outcome Measures     Row Name 05/16/22 1132          How much help from another person do you currently need...    Turning from your back to your side while in flat bed without using bedrails? 3  -DB     Moving from lying on back to sitting on the side of a flat bed without bedrails? 3  -DB     Moving to and from a bed to a chair (including a wheelchair)? 3  -DB     Standing up from a chair using your arms (e.g., wheelchair, bedside chair)? 3  -DB     Climbing 3-5 steps with a railing? 2  -DB     To walk in hospital room? 3  -DB     AM-PAC 6 Clicks Score (PT) 17  -DB     Highest level of mobility 5 --> Static standing  -DB     Row Name 05/16/22 1132          Functional Assessment    Outcome Measure Options AM-PAC 6 Clicks Basic Mobility (PT)  -DB           User Key  (r) = Recorded By, (t) = Taken By, (c) = Cosigned By    Initials Name Provider Type    DB Vinita Delvalle PT Physical Therapist                             Physical Therapy Education                 Title: PT OT SLP Therapies (Done)     Topic: Physical Therapy (Done)     Point: Mobility training (Done)     Learning Progress Summary           Patient Acceptance, E, VU by DB at 5/16/2022 1138                   Point: Home exercise program (Done)     Learning Progress Summary           Patient Acceptance, E, VU by DB at 5/16/2022 1138                   Point: Body mechanics (Done)     Learning Progress Summary           Patient Acceptance, E, VU by DB at 5/16/2022 1138                   Point: Precautions (Done)     Learning Progress Summary           Patient Acceptance, E, VU by DB at 5/16/2022 1138                               User Key     Initials Effective Dates Name Provider Type Discipline    DB 06/16/21 -  Vinita Delvalle PT Physical Therapist PT              PT  Recommendation and Plan  Planned Therapy Interventions (PT): balance training, bed mobility training, gait training, home exercise program, neuromuscular re-education, patient/family education, postural re-education, transfer training, stair training, strengthening  Plan of Care Reviewed With: patient  Outcome Evaluation: Pt is a 70 y/o F admitted to [facility] with recent confusion. Recent R TKA on 5/10, pt was home and states she was having a friend stay with her. States she has 2 HUGO, no stairs, and has been using RW. Pt today was A&O x 4, but seemed to have difficulty with answering questions about PLOF and difficulty with sequencing for getting OOB. Pt was SBA with inc'd time needed for sup<>sit, Mica for STS, and CGA to ambulate 30' with RW. Gait likmited d/t pt needing to be transported to [facility]. Pt demo's dec'd strength, endurance, and balance and will benefit from skilled PT intervention. Rec D/C home with 24/7 assist and continue HH.     Time Calculation:    PT Charges     Row Name 05/16/22 1139             Time Calculation    Start Time 0905  -DB      Stop Time 0918  -DB      Time Calculation (min) 13 min  -DB      PT Received On 05/16/22  -DB      PT - Next Appointment 05/17/22  -DB      PT Goal Re-Cert Due Date 05/23/22  -DB              Time Calculation- PT    Total Timed Code Minutes- PT 8 minute(s)  -DB            User Key  (r) = Recorded By, (t) = Taken By, (c) = Cosigned By    Initials Name Provider Type    Vinita Weaver, PT Physical Therapist              Therapy Charges for Today     Code Description Service Date Service Provider Modifiers Qty    [id number]  PT EVAL MOD COMPLEXITY 2 5/16/2022 Vinita Delvalle, PT GP 1    [id number]  PT THERAPEUTIC ACT EA 15 MIN 5/16/2022 Vinita Delvalle, PT GP 1          PT G-Codes  Outcome Measure Options: AM-PAC 6 Clicks Basic Mobility (PT)  AM-PAC 6 Clicks Score (PT): 17    Vinita Delvalle PT  5/16/2022

## 2022-05-16 NOTE — PLAN OF CARE
Goal Outcome Evaluation:  Plan of Care Reviewed With: patient        Progress: no change  Outcome Evaluation: Alert and orientedx4. NAD. VSS. MIVF infusing. OOB to BSC with large BM and large amt of urine noted. Assist with walker x2. Pt with unsteady gait. Will CTM.

## 2022-05-16 NOTE — CONSULTS
Patient Identification:  NAME:  Geraldine Holloway  Age:  71 y.o.   Sex:  female   :  1950   MRN:  [id number]       Chief complaint: She does not have one, reason for consult change in mental status confusion    History of present illness: Patient is a 71-year-old right-handed white female with a history of seizure disorder last seizure at least 5 years ago hyperlipidemia, diverticulitis, hypertension who comes to the hospital for low-grade fever, dysuria and change in mental status.  Specifically this patient had right knee surgery earlier this week and that would be the context.  Location is not pertinent with respect to her confusion.  Duration several days context the patient who has been taking 5 mg Percocets on a every 4 hours basis and previous to this she was not on any opiates.  Quality was confusion and not able to give a history.  Modifying factors we have held that medicine overnight and today she is awake alert well oriented and now complains of some pain in the right knee which would indicate that her metabolic encephalopathy and confusion is much better overnight.  She is currently awake and alert and basically has no complaints.  Neurologically no double vision no headache no focal paresthesias or paralysis.  CT scan of the head was not performed.  She does have a history of left craniotomy and left frontal lobe encephalomalacia based upon previous CTs of the head.  She does not have anything new from a neurologic perspective.  No new numbness, focal paralysis, paresthesias, or anything like that she was just simply drowsy and confused and that has all resolved overnight.  She is afebrile today and her peripheral white count is normal.    Past medical history:  Past Medical History:   Diagnosis Date   • Anxiety    • Arthritis    • ASCUS of cervix with negative high risk HPV 2008   • At risk for sleep apnea     5   • AVM (arteriovenous malformation) brain     hx surgery   • Depression    •  Disease of thyroid gland    • Diverticulitis    • Diverticulitis    • History of seizures     LAST SEIZURE 5 YR AGO   • Hyperlipidemia    • Hypertension    • Memory disorder    • Menopause    • Ovarian cyst    • Skin cancer    • Stroke (HCC) 2009    DR. COVARRUBIAS/[facility].   • Urinary dribbling    • Urinary incontinence    • Uterine prolapse        Past surgical history:  Past Surgical History:   Procedure Laterality Date   • ANTERIOR AND POSTERIOR VAGINAL REPAIR N/A 06/02/2020    Procedure: Pubovaginal sling Posterior colporrhaphy  Insertion of vaginal grafts Cystourethroscopy;  Surgeon: Geraldine Muñoz MD;  Location: [facility];  Service: Gynecology;  Laterality: N/A;   • BREAST CYST EXCISION Left 1999   • CATARACT EXTRACTION EXTRACAPSULAR W/ INTRAOCULAR LENS IMPLANTATION Bilateral    • COLONOSCOPY     • CRANIOTOMY     • ECTOPIC PREGNANCY  12/1975    [facility]/ DR. MONTEZ.   • TOTAL KNEE ARTHROPLASTY Right 5/10/2022    Procedure: TOTAL KNEE ARTHROPLASTY;  Surgeon: Jean Burroughs MD;  Location: [facility] OR;  Service: Orthopedics;  Laterality: Right;   • TOTAL LAPAROSCOPIC HYSTERECTOMY, SACROCOLPOPEXY N/A 06/02/2020    Procedure: Laparoscopic uterosacral ligament colpopexy sacral colpopexy  Laparoscopic paravaginal repair Laparoscopic hysterectomy with bilateral salpingectomy Laparoscopic abdominal enterocele repair;  Surgeon: Geraldine Muñoz MD;  Location: [facility];  Service: Gynecology;  Laterality: N/A;   • WRIST SURGERY Right 2005    PIN PLACED. DR. LUCIAN ALMEIDA, [facility].       Allergies:  Iodine and Phenytoin    Home medications:  Medications Prior to Admission   Medication Sig Dispense Refill Last Dose   • aspirin  MG tablet Take 1 tablet by mouth 2 (Two) Times a Day With Meals. 60 tablet 0 5/14/2022 at Unknown time   • atenolol (TENORMIN) 50 MG tablet Take 50 mg by mouth Every Night.   5/14/2022 at Unknown time   • azelastine (ASTELIN) 0.1 % nasal spray 2  sprays into the nostril(s) as directed by provider 2 (Two) Times a Day. Use in each nostril as directed   Past Week at Unknown time   • Calcium Citrate-Vitamin D 250-200 MG-UNIT tablet Take 1 tablet by mouth Daily.   5/14/2022 at Unknown time   • clonazePAM (KlonoPIN) 0.5 MG tablet Take 0.5 mg by mouth 3 (Three) Times a Day.   5/14/2022 at Unknown time   • Cyanocobalamin (VITAMIN B-12 PO) Take 1,000 mcg by mouth Every Morning. HOLD PRIOR TO SURG   5/14/2022 at Unknown time   • diclofenac (VOLTAREN) 75 MG EC tablet Take 75 mg by mouth 2 (Two) Times a Day.   5/14/2022 at Unknown time   • docusate sodium (COLACE) 250 MG capsule Take 1 capsule by mouth 2 (Two) Times a Day As Needed for Constipation. 30 capsule 1 5/14/2022 at Unknown time   • lamoTRIgine (LaMICtal) 100 MG tablet Take 100 mg by mouth 2 (Two) Times a Day.   5/14/2022 at Unknown time   • levETIRAcetam (KEPPRA) 1000 MG tablet Take 1,500 mg by mouth 2 (Two) Times a Day.   5/14/2022 at Unknown time   • levothyroxine (SYNTHROID, LEVOTHROID) 112 MCG tablet Take 112 mcg by mouth Every Morning.   5/14/2022 at Unknown time   • losartan-hydrochlorothiazide (HYZAAR) 100-12.5 MG per tablet Take 1 tablet by mouth Every Morning.   5/14/2022 at Unknown time   • magnesium oxide (MAG-OX) 400 tablet tablet Take 400 mg by mouth Daily.   5/14/2022 at Unknown time   • Multiple Vitamin tablet Take 1 tablet by mouth Daily.   5/14/2022 at Unknown time   • Omega-3 Fatty Acids (FISH OIL PO) Take 1 capsule by mouth Every Morning. HOLD PRIOR TO SURG   5/14/2022 at Unknown time   • oxyCODONE-acetaminophen (PERCOCET) 5-325 MG per tablet Take 1-2 tablets by mouth Every 4 (Four) Hours As Needed (Pain). 60 tablet 0 5/15/2022 at Unknown time   • potassium chloride 10 MEQ CR tablet Take 1 tablet by mouth Daily. 30 tablet 1 5/14/2022 at Unknown time   • rosuvastatin (CRESTOR) 10 MG tablet Take 10 mg by mouth Every Night.   5/14/2022 at Unknown time   • sertraline (ZOLOFT) 100 MG tablet Take   by mouth. TAKES 50 MG AT AM  MG HS   5/14/2022 at Unknown time   • zonisamide (ZONEGRAN) 100 MG capsule Take 200 mg by mouth Every Night.   5/14/2022 at Unknown time   • prochlorperazine (COMPAZINE) 5 MG tablet Take 5 mg by mouth Every 6 (Six) Hours As Needed for Nausea or Vomiting.   Unknown at Unknown time   • ubrogepant (UBRELVY) 50 MG tablet Take 50 mg by mouth. 1 tab at start of migraine headache and may repeat   More than a month at Unknown time        Hospital medications:  acetaminophen, 500 mg, Oral, 4x Daily  atenolol, 50 mg, Oral, Nightly  azelastine, 2 spray, Each Nare, BID  clonazePAM, 0.5 mg, Oral, TID  docusate sodium, 100 mg, Oral, BID  enoxaparin, 40 mg, Subcutaneous, Q24H  lamoTRIgine, 100 mg, Oral, Q12H  levETIRAcetam, 1,500 mg, Oral, BID  levothyroxine, 112 mcg, Oral, Q AM  rosuvastatin, 10 mg, Oral, Nightly  sertraline, 100 mg, Oral, Nightly  sodium chloride, 10 mL, Intravenous, Q12H  zonisamide, 200 mg, Oral, Nightly      sodium chloride 0.9 % with KCl 20 mEq, 100 mL/hr, Last Rate: 100 mL/hr (05/16/22 1050)      acetaminophen  •  HYDROcodone-acetaminophen  •  melatonin  •  ondansetron **OR** ondansetron  •  prochlorperazine  •  [COMPLETED] Insert peripheral IV **AND** sodium chloride  •  sodium chloride    Family history:  Family History   Problem Relation Age of Onset   • Emphysema Mother 65   • Rheum arthritis Mother    • Lymphoma Father 79   • Multiple myeloma Sister 41   • Lymphoma Sister 41   • Aneurysm Brother    • Bipolar disorder Son    • Heart attack Sister 41   • Malig Hyperthermia Neg Hx        Social history:  Social History     Tobacco Use   • Smoking status: Never Smoker   • Smokeless tobacco: Never Used   Vaping Use   • Vaping Use: Never used   Substance Use Topics   • Alcohol use: Not Currently   • Drug use: Never       Review of systems:    She has some right knee pain but otherwise denies hair eyes ears nose throat skin  lymphatic hematologic oncologic complaints no  neck pain chest pain abdominal pain bowel bladder incontinence, cough, congestion focal paresthesias or paralysis.    Objective:  Vitals Ranges:   Temp:  [97.9 °F (36.6 °C)-98.6 °F (37 °C)] 98.6 °F (37 °C)  Heart Rate:  [62-79] 67  Resp:  [18] 18  BP: (115-145)/(61-87) 133/69      Physical Exam:  Patient is awake alert oriented x3 in no distress fund of knowledge good attention span concentration good recent remote memory good language function normal well-developed well-nourished in no distress pupils 3 constricting to 2-1/2 unable to visualize disc retinas extraocular movements full without nystagmus nasolabial folds palate tongue symmetrical normal hearing facial sensation head turning shoulder shrug motor 5 out of 5 x 3 extremities limited with respect to the recent right knee surgery.  No atrophy or fasciculations or cogwheeling rigidity no resting tremor reflexes absent toes downgoing bilaterally sensation normal light touch face both arms both legs coordination normal in the upper extremities Station and gait was impossible she has a new knee and I was afraid she would fall heart is regular without murmur neck supple without bruits extremities no clubbing cyanosis or edema visual acuity normal at 3 feet    Results review:   I reviewed the patient's new clinical results.    Data review:  Lab Results (last 24 hours)     Procedure Component Value Units Date/Time    CBC (No Diff) [[id number]]  (Abnormal) Collected: 05/16/22 1125    Specimen: Blood Updated: 05/16/22 1149     WBC 7.95 10*3/mm3      RBC 3.42 10*6/mm3      Hemoglobin 10.8 g/dL      Hematocrit 32.5 %      MCV 95.0 fL      MCH 31.6 pg      MCHC 33.2 g/dL      RDW 12.6 %      RDW-SD 43.9 fl      MPV 9.0 fL      Platelets 237 10*3/mm3     Blood Culture - Blood, Arm, Right [[id number]]  (Normal) Collected: 05/15/22 1132    Specimen: Blood from Arm, Right Updated: 05/16/22 1148     Blood Culture No growth at 24 hours    Blood Culture - Blood, Arm, Left  [[id number]]  (Normal) Collected: 05/15/22 1132    Specimen: Blood from Arm, Left Updated: 05/16/22 1148     Blood Culture No growth at 24 hours    Renal Function Panel [[id number]] Collected: 05/16/22 1125    Specimen: Blood Updated: 05/16/22 1142    TSH [[id number]] Collected: 05/16/22 1125    Specimen: Blood Updated: 05/16/22 1142    Sedimentation Rate [[id number]]  (Normal) Collected: 05/15/22 1052    Specimen: Blood Updated: 05/16/22 1107     Sed Rate 13 mm/hr     C-reactive Protein [[id number]]  (Abnormal) Collected: 05/15/22 1052    Specimen: Blood Updated: 05/16/22 1049     C-Reactive Protein 28.69 mg/dL     [place] Draw [[id number]] Collected: 05/15/22 1052    Specimen: Blood Updated: 05/15/22 1452    Narrative:      The following orders were created for panel order [place] Draw.  Procedure                               Abnormality         Status                     ---------                               -----------         ------                     Green Top (Gel)[[id number]]                                  Final result               Lavender Top[[id number]]                                     Final result               Gold Top - SST[[id number]]                                                              Light Blue Top[[id number]]                                   Final result                 Please view results for these tests on the individual orders.    Respiratory Panel PCR w/COVID-19(SARS-CoV-2) ALONZO/VIDHYA/SAVANA/PAD/COR/MAD/SHIKHA In-House, NP Swab in UTM/VTM, 3-4 HR TAT - Swab, Nasopharynx [[id number]]  (Normal) Collected: 05/15/22 1132    Specimen: Swab from Nasopharynx Updated: 05/15/22 1235     ADENOVIRUS, PCR Not Detected     Coronavirus 229E Not Detected     Coronavirus HKU1 Not Detected     Coronavirus NL63 Not Detected     Coronavirus OC43 Not Detected     COVID19 Not Detected     Human Metapneumovirus Not Detected     Human Rhinovirus/Enterovirus Not Detected     Influenza A PCR Not Detected     Influenza  B PCR Not Detected     Parainfluenza Virus 1 Not Detected     Parainfluenza Virus 2 Not Detected     Parainfluenza Virus 3 Not Detected     Parainfluenza Virus 4 Not Detected     RSV, PCR Not Detected     Bordetella pertussis pcr Not Detected     Bordetella parapertussis PCR Not Detected     Chlamydophila pneumoniae PCR Not Detected     Mycoplasma pneumo by PCR Not Detected    Narrative:      In the setting of a positive respiratory panel with a viral infection PLUS a negative procalcitonin without other underlying concern for bacterial infection, consider observing off antibiotics or discontinuation of antibiotics and continue supportive care. If the respiratory panel is positive for atypical bacterial infection (Bordetella pertussis, Chlamydophila pneumoniae, or Mycoplasma pneumoniae), consider antibiotic de-escalation to target atypical bacterial infection.           Imaging:  Imaging Results (Last 24 Hours)     ** No results found for the last 24 hours. **         PPE worn at all times washed before washed afterwards disposed of everything properly is now within 6 feet of her for more than few minutes during my exam no aerosols used at any point    Assessment and Plan:       Metabolic encephalopathy    Essential hypertension    Postoperative fever    Atelectasis    Dehydration    Hypokalemia    History of total knee arthroplasty    TERESE (acute kidney injury) (HCC)    Polypharmacy    Benzodiazepine dependence (HCC)  This patient had a metabolic encephalopathy.  I believe primarily secondary to frequent use of the Percocets in combination with reduced oral intake and appetite, and on top of her multiple other medications that she takes on a regular basis.  She did have a slight fever when she came in and today is afebrile.  At this point she is awake alert oriented x3 and I believe the metabolic encephalopathy has resolved.  I do not see evidence of an acute stroke TIA or central nervous system infection and would  not recommend further neurologic work-up at this time  Thanks      Agustin Ivey MD  05/16/22  12:15 EDT

## 2022-05-16 NOTE — PROGRESS NOTES
Name: Geraldine Holloway ADMIT: 5/15/2022   : 1950  PCP: Verito Pulido MD    MRN: [id number] LOS: 0 days   AGE/SEX: 71 y.o. female  ROOM: [facility]     Subjective   Subjective   Resting comfortably in bed.  Denies any infectious complaints such as cough, sinus pressure, abdominal pain, nausea, diarrhea, dysuria.  She is oriented to person place time and situation    Review of Systems  As above     Objective   Objective   Vital Signs  Temp:  [97.9 °F (36.6 °C)-98.6 °F (37 °C)] 98.6 °F (37 °C)  Heart Rate:  [62-79] 67  Resp:  [18] 18  BP: (122-145)/(61-87) 133/69  SpO2:  [95 %-96 %] 95 %  on   ;   Device (Oxygen Therapy): room air  Body mass index is 37.84 kg/m².  Physical Exam  Constitutional:       General: She is not in acute distress.     Appearance: She is not ill-appearing.   Cardiovascular:      Rate and Rhythm: Normal rate and regular rhythm.   Pulmonary:      Effort: Pulmonary effort is normal. No respiratory distress.      Breath sounds: No wheezing.   Abdominal:      Palpations: Abdomen is soft.      Tenderness: There is no abdominal tenderness. There is no guarding.   Musculoskeletal:      Comments: Right lower extremity with nonpitting edema.  Surgical dressing clean and dry, no discharge   Neurological:      General: No focal deficit present.      Mental Status: She is alert and oriented to person, place, and time.      Cranial Nerves: No cranial nerve deficit.   Psychiatric:         Mood and Affect: Mood normal.         Results Review     I reviewed the patient's new clinical results.  Results from last 7 days   Lab Units 22  1125 05/15/22  1052 22  0450   WBC 10*3/mm3 7.95 10.93* 6.26   HEMOGLOBIN g/dL 10.8* 10.6* 11.3*   PLATELETS 10*3/mm3 237 249 169     Results from last 7 days   Lab Units 22  1125 05/15/22  1052 22  0450   SODIUM mmol/L 139 141 140   POTASSIUM mmol/L 3.4* 3.2* 3.1*   CHLORIDE mmol/L 107 106 104   CO2 mmol/L 24.0 25.0 25.0   BUN mg/dL 22 26*  16   CREATININE mg/dL 0.95 1.22* 0.94   GLUCOSE mg/dL 102* 118* 113*   Estimated Creatinine Clearance: 61.5 mL/min (by C-G formula based on SCr of 0.95 mg/dL).  Results from last 7 days   Lab Units 05/16/22  1125 05/15/22  1052   ALBUMIN g/dL 3.00* 2.90*   BILIRUBIN mg/dL  --  1.2   ALK PHOS U/L  --  125*   AST (SGOT) U/L  --  19   ALT (SGPT) U/L  --  12     Results from last 7 days   Lab Units 05/16/22  1125 05/15/22  1052 05/11/22  0450   CALCIUM mg/dL 8.6 8.7 8.3*   ALBUMIN g/dL 3.00* 2.90*  --    PHOSPHORUS mg/dL 2.0*  --   --      Results from last 7 days   Lab Units 05/15/22  1132 05/15/22  1052   PROCALCITONIN ng/mL  --  0.54*   LACTATE mmol/L 1.0  --      COVID19   Date Value Ref Range Status   05/15/2022 Not Detected Not Detected - Ref. Range Final   05/07/2022 Not Detected Not Detected - Ref. Range Final     No results found for: HGBA1C, POCGLU    Duplex Venous Lower Extremity - Right CAR  · Normal right lower extremity venous duplex scan.       I reviewed the patient's daily medications.  Scheduled Medications  acetaminophen, 500 mg, Oral, 4x Daily  atenolol, 50 mg, Oral, Nightly  azelastine, 2 spray, Each Nare, BID  clonazePAM, 0.5 mg, Oral, TID  docusate sodium, 100 mg, Oral, BID  enoxaparin, 40 mg, Subcutaneous, Q24H  lamoTRIgine, 100 mg, Oral, Q12H  levETIRAcetam, 1,500 mg, Oral, BID  levothyroxine, 112 mcg, Oral, Q AM  rosuvastatin, 10 mg, Oral, Nightly  sertraline, 100 mg, Oral, Nightly  sodium chloride, 10 mL, Intravenous, Q12H  zonisamide, 200 mg, Oral, Nightly    Infusions  sodium chloride 0.9 % with KCl 20 mEq, 100 mL/hr, Last Rate: 100 mL/hr (05/16/22 1256)    Diet  Diet Regular       Assessment/Plan     Active Hospital Problems    Diagnosis  POA   • **Metabolic encephalopathy [G93.41]  Yes   • TERESE (acute kidney injury) (HCC) [N17.9]  Yes   • Polypharmacy [Z79.899]  Not Applicable   • Benzodiazepine dependence (HCC) [F13.20]  Yes   • Postoperative fever [R50.82]  Yes   • Atelectasis [J98.11]   Yes   • Dehydration [E86.0]  Yes   • Hypokalemia [E87.6]  Yes   • History of total knee arthroplasty [Z96.659]  Not Applicable   • Essential hypertension [I10]  Yes      Resolved Hospital Problems   No resolved problems to display.       71 y.o. female with history of seizure disorder, anxiety and benzodiazepine dependence who presented with altered mental status and fever.  She had right total knee arthroplasty 6 days prior.    Today: replace K and phos.  Monitor another 24 hours.  If no fever, home tomorrow    Acute metabolic encephalopathy  -Likely due to polypharmacy in setting of chronic benzodiazepine use and postoperative Percocet use  -Resolved.  Stop Percocet, can have Norco as needed for pain.  Counseled on sparing use    Post operative fever  -She has no infectious complaints.  Urinalysis negative for infection and chest x-ray unremarkable.  Lower extremity ultrasound negative for DVT.  Blood cultures have no growth at 24 hours.    -At this point, low-grade fevers most likely due to postoperative atelectasis.  She has an elevated CRP but this is very nonspecific. Ortho has evaluated her knee, and are not concerned for infection    Status post right total knee arthroplasty most  -On Lovenox currently can resume aspirin 325 mg twice daily on discharge for DVT prophylaxis    TERESE  -Creatinine peaked at 1.2, baseline 0.8.  Likely due to poor p.o. intake postop surgery.  Resolved with fluids    Seizure disorder  -Evaluated by neurology, no medication changes.  Continue Lamictal, Keppra and Zonegran    Hypertension: Blood pressure acceptable acutely.  Continue atenolol    Hypothyroid: TSH is within normal limits.  Resume home synthroid    · Lovenox 40 mg SC daily for DVT prophylaxis.  · Full code.  · Discussed with patient and care team on multidisciplinary rounds.  · Anticipate discharge home with home health tomorrow.      Etta Dee [facility]  05/16/22  14:09 EDT    Patient  was placed in face mask on first look.  I wore protective equipment throughout this patient encounter including a face mask, gloves and protective eyewear.  Hand hygiene was performed before donning protective equipment and after removal when leaving the room.

## 2022-05-16 NOTE — DISCHARGE PLACEMENT REQUEST
"Geraldine Holloway (71 y.o. Female)             Date of Birth   1950    Social Security Number       Address   [address] [address]    Home Phone   [phone]    MRN   [id number]       Yazdanism   Taoism    Marital Status                               Admission Date   5/15/22    Admission Type   Emergency    Admitting Provider   Moreno Brito MD    Attending Provider   Etta Dee DO    Department, Room/Bed   [address], [address]/1       Discharge Date       Discharge Disposition       Discharge Destination                               Attending Provider: Etta Dee DO    Allergies: Iodine, Phenytoin    Isolation: None   Infection: None   Code Status: CPR   Advance Care Planning Activity    Ht: 162.6 cm (64\")   Wt: 100 kg (220 lb 7.4 oz)    Admission Cmt: None   Principal Problem: Metabolic encephalopathy [G93.41]                 Active Insurance as of 5/15/2022     Primary Coverage     Payor Plan Insurance Group Employer/Plan Group    HUMANA MEDICARE REPLACEMENT HUMANA MEDICARE REPLACEMENT [id number]     Payor Plan Address Payor Plan Phone Number Payor Plan Fax Number Effective Dates    [address] [address] [phone]  1/1/2022 - None Entered    [facility] [address]       Subscriber Name Subscriber Birth Date Member ID       GERALDINE HOLLOWAY 1950 [id number]                 Emergency Contacts      (Rel.) Home Phone Work Phone Mobile Phone    LeeAgustin (Son) [phone] -- [phone]    Mila Rojas (Sister) -- -- [phone]    Colleen Arellano (Sister) -- -- [phone]              "

## 2022-05-16 NOTE — CONSULTS
Orthopaedic Surgery  Progress Note  5/16/2022    Patients Name:  Geraldine Holloway  YOB: 1950  Age: 71 y.o.  Medical Records Number:  [id number]  Date of Admission: 5/15/2022    Patient presents to ER with confusion and low grade fever.  She denies any drainage from the right knee.  She is 6 days s/p right total knee arthroplasty    Vitals:  Vitals:    05/15/22 1603 05/15/22 2030 05/15/22 2333 05/16/22 0715   BP: 128/62 145/87 122/61 133/69   BP Location: Right arm Right arm Right arm Right arm   Patient Position: Sitting Sitting Lying Lying   Pulse: 72 79 62 67   Resp: 18 18 18 18   Temp: 97.9 °F (36.6 °C) 98 °F (36.7 °C) 98.6 °F (37 °C) 98.6 °F (37 °C)   TempSrc: Oral Oral Oral Oral   SpO2: 96% 96% 95%    Weight:           RLE:  NVI, calf nontender, sensation intact  No signs of DVT    Incision: clean, no signs of infection    Lab Results (last 24 hours)     Procedure Component Value Units Date/Time    [place] Draw [[id number]] Collected: 05/15/22 1052    Specimen: Blood Updated: 05/15/22 4662    Narrative:      The following orders were created for panel order [place] Draw.  Procedure                               Abnormality         Status                     ---------                               -----------         ------                     Green Top (Gel)[[id number]]                                  Final result               Lavender Top[[id number]]                                     Final result               Gold Top - SST[[id number]]                                                              Light Blue Top[[id number]]                                   Final result                 Please view results for these tests on the individual orders.    Respiratory Panel PCR w/COVID-19(SARS-CoV-2) ALONZO/VIDHYA/SAVANA/PAD/COR/MAD/SHIKHA In-House, NP Swab in UTM/VTM, 3-4 HR TAT - Swab, Nasopharynx [[id number]]  (Normal) Collected: 05/15/22 1132    Specimen: Swab from Nasopharynx Updated: 05/15/22 4245      ADENOVIRUS, PCR Not Detected     Coronavirus 229E Not Detected     Coronavirus HKU1 Not Detected     Coronavirus NL63 Not Detected     Coronavirus OC43 Not Detected     COVID19 Not Detected     Human Metapneumovirus Not Detected     Human Rhinovirus/Enterovirus Not Detected     Influenza A PCR Not Detected     Influenza B PCR Not Detected     Parainfluenza Virus 1 Not Detected     Parainfluenza Virus 2 Not Detected     Parainfluenza Virus 3 Not Detected     Parainfluenza Virus 4 Not Detected     RSV, PCR Not Detected     Bordetella pertussis pcr Not Detected     Bordetella parapertussis PCR Not Detected     Chlamydophila pneumoniae PCR Not Detected     Mycoplasma pneumo by PCR Not Detected    Narrative:      In the setting of a positive respiratory panel with a viral infection PLUS a negative procalcitonin without other underlying concern for bacterial infection, consider observing off antibiotics or discontinuation of antibiotics and continue supportive care. If the respiratory panel is positive for atypical bacterial infection (Bordetella pertussis, Chlamydophila pneumoniae, or Mycoplasma pneumoniae), consider antibiotic de-escalation to target atypical bacterial infection.    Green Top (Gel) [[id number]] Collected: 05/15/22 1052    Specimen: Blood Updated: 05/15/22 1203     Extra Tube Hold for add-ons.     Comment: Auto resulted.       Light Blue Top [[id number]] Collected: 05/15/22 1052    Specimen: Blood Updated: 05/15/22 1203     Extra Tube Hold for add-ons.     Comment: Auto resulted       Lavender Top [[id number]] Collected: 05/15/22 1052    Specimen: Blood Updated: 05/15/22 1203     Extra Tube hold for add-on     Comment: Auto resulted       Lactic Acid, Plasma [[id number]]  (Normal) Collected: 05/15/22 1132    Specimen: Blood Updated: 05/15/22 1158     Lactate 1.0 mmol/L     Manual Differential [[id number]]  (Abnormal) Collected: 05/15/22 1052    Specimen: Blood Updated: 05/15/22 1152     Neutrophil % 85.0 %       Lymphocyte % 10.0 %      Monocyte % 4.0 %      Eosinophil % 1.0 %      Neutrophils Absolute 9.29 10*3/mm3      Lymphocytes Absolute 1.09 10*3/mm3      Monocytes Absolute 0.44 10*3/mm3      Eosinophils Absolute 0.11 10*3/mm3      Anisocytosis Mod/2+     WBC Morphology Normal     Platelet Morphology Normal    Comprehensive Metabolic Panel [[id number]]  (Abnormal) Collected: 05/15/22 1052    Specimen: Blood Updated: 05/15/22 1147     Glucose 118 mg/dL      BUN 26 mg/dL      Creatinine 1.22 mg/dL      Sodium 141 mmol/L      Potassium 3.2 mmol/L      Chloride 106 mmol/L      CO2 25.0 mmol/L      Calcium 8.7 mg/dL      Total Protein 5.9 g/dL      Albumin 2.90 g/dL      ALT (SGPT) 12 U/L      AST (SGOT) 19 U/L      Alkaline Phosphatase 125 U/L      Total Bilirubin 1.2 mg/dL      Globulin 3.0 gm/dL      A/G Ratio 1.0 g/dL      BUN/Creatinine Ratio 21.3     Anion Gap 10.0 mmol/L      eGFR 47.2 mL/min/1.73      Comment: National Kidney Foundation and American Society of Nephrology (ASN) Task Force recommended calculation based on the Chronic Kidney Disease Epidemiology Collaboration (CKD-EPI) equation refit without adjustment for race.       Narrative:      GFR Normal >60  Chronic Kidney Disease <60  Kidney Failure <15      Procalcitonin [[id number]]  (Abnormal) Collected: 05/15/22 1052    Specimen: Blood Updated: 05/15/22 1147     Procalcitonin 0.54 ng/mL     Narrative:      As a Marker for Sepsis (Non-Neonates):    1. <0.5 ng/mL represents a low risk of severe sepsis and/or septic shock.  2. >2 ng/mL represents a high risk of severe sepsis and/or septic shock.    As a Marker for Lower Respiratory Tract Infections that require antibiotic therapy:    PCT on Admission    Antibiotic Therapy       6-12 Hrs later    >0.5                Strongly Recommended  >0.25 - <0.5        Recommended   0.1 - 0.25          Discouraged              Remeasure/reassess PCT  <0.1                Strongly Discouraged     Remeasure/reassess  "PCT    As 28 day mortality risk marker: \"Change in Procalcitonin Result\" (>80% or <=80%) if Day 0 (or Day 1) and Day 4 values are available. Refer to http://www.[facility]-pct-calculator.com    Change in PCT <=80%  A decrease of PCT levels below or equal to 80% defines a positive change in PCT test result representing a higher risk for 28-day all-cause mortality of patients diagnosed with severe sepsis for septic shock.    Change in PCT >80%  A decrease of PCT levels of more than 80% defines a negative change in PCT result representing a lower risk for 28-day all-cause mortality of patients diagnosed with severe sepsis or septic shock.       Blood Culture - Blood, Arm, Right [[id number]] Collected: 05/15/22 1132    Specimen: Blood from Arm, Right Updated: 05/15/22 1139    Blood Culture - Blood, Arm, Left [[id number]] Collected: 05/15/22 1132    Specimen: Blood from Arm, Left Updated: 05/15/22 1139    CBC & Differential [[id number]]  (Abnormal) Collected: 05/15/22 1052    Specimen: Blood Updated: 05/15/22 1132    Narrative:      The following orders were created for panel order CBC & Differential.  Procedure                               Abnormality         Status                     ---------                               -----------         ------                     CBC Auto Differential[[id number]]        Abnormal            Final result                 Please view results for these tests on the individual orders.    CBC Auto Differential [[id number]]  (Abnormal) Collected: 05/15/22 1052    Specimen: Blood Updated: 05/15/22 1132     WBC 10.93 10*3/mm3      RBC 3.38 10*6/mm3      Hemoglobin 10.6 g/dL      Hematocrit 31.4 %      MCV 92.9 fL      MCH 31.4 pg      MCHC 33.8 g/dL      RDW 13.0 %      RDW-SD 44.2 fl      MPV 9.3 fL      Platelets 249 10*3/mm3      nRBC 0.0 /100 WBC     Urinalysis With Microscopic If Indicated (No Culture) - Urine, Clean Catch [[id number]]  (Normal) Collected: 05/15/22 1052    Specimen: Urine, " Clean Catch Updated: 05/15/22 1106     Color, UA Yellow     Appearance, UA Clear     pH, UA 6.0     Specific Gravity, UA 1.007     Glucose, UA Negative     Ketones, UA Negative     Bilirubin, UA Negative     Blood, UA Negative     Protein, UA Negative     Leuk Esterase, UA Negative     Nitrite, UA Negative     Urobilinogen, UA 0.2 E.U./dL    Narrative:      Urine microscopic not indicated.          XR Knee 1 or 2 View Right    Result Date: 5/10/2022  Narrative: XR KNEE 1 OR 2 VW RIGHT-  05/10/2022  HISTORY: Postop right knee arthroplasty.  The distal femoral and proximal tibia components of the right knee prosthesis are well-seated with no abnormal surrounding bony lucencies. Soft tissue air skin staples and small caliber catheter are seen. There is no unexpected findings are noted.      Impression: Satisfactory postoperative appearance of the right knee.  This report was finalized on 5/10/2022 11:35 AM by Dr. Felix Sampson M.D.      XR Knee 1 or 2 View Right    Result Date: 4/26/2022  Narrative: RIGHT KNEE  CLINICAL HISTORY: Right knee pain. Preop arthroplasty.  AP and lateral views demonstrate extensive osteoarthritis involving all 3 compartments of the knee joint. There is marked narrowing of the patellofemoral and medial compartments with bony erosion and prominent marginal bony spurring. A large loose body is evident in the posterior aspect of the joint space. There is mild valgus angulation due to the arthritis. There is no evidence recent or old fracture. A small joint effusion is evident.  This report was finalized on 4/26/2022 8:50 PM by Dr. Cam Krueger M.D.      Peripheral Block    Result Date: 5/10/2022  Narrative: Vidal Blackman MD     5/10/2022  7:25 AM Peripheral Block Pre-sedation assessment completed: 5/10/2022 7:24 AM Patient reassessed immediately prior to procedure Patient location during procedure: holding area Start time: 5/10/2022 7:24 AM Stop time: 5/10/2022 7:34 AM Reason for  block: at surgeon's request and post-op pain management Performed by Anesthesiologist: Vidal Blackman MD Preanesthetic Checklist Completed: patient identified, IV checked, site marked, risks and benefits discussed, surgical consent, monitors and equipment checked, pre-op evaluation and timeout performed Prep: Sterile barriers:cap, gloves, gown, mask and sterile barriers Prep: ChloraPrep Patient monitoring: blood pressure monitoring, continuous pulse oximetry and EKG Procedure Sedation: yes Performed under: local infiltration Guidance:ultrasound guided ULTRASOUND INTERPRETATION.  Using ultrasound guidance a 21 G gauge needle was placed in close proximity to the femoral nerve, at which point, under ultrasound guidance anesthetic was injected in the area of the nerve and spread of the anesthesia was seen on ultrasound in close proximity thereto.  There were no abnormalities seen on ultrasound; a digital image was taken; and the patient tolerated the procedure with no complications. Images:still images obtained Laterality:right Block Type:adductor canal block Injection Technique:single-shot Needle Type:echogenic Needle Gauge:21 G Medications Used: Mepivacaine HCl (PF) (CARBOCAINE) 1.5 % injection, 10 mL ropivacaine (NAROPIN) 0.2 % injection, 20 mL Medications Comment:Ultrasound Interpretation: Using ultrasound guidance, the needle was placed in close proximity to the target nerve and anesthetic was injected in the area of the target nerve and/or bundles, and spread of the anesthetic was seen on ultrasound in close proximity thereto.  There were no abnormalities seen on ultrasound; a digital / physical image was taken; and the patient tolerated the procedure with no complications. Block placed for postoperative pain control per surgeon request. Post Assessment Injection Assessment: negative aspiration for heme, no paresthesia on injection and incremental injection Patient Tolerance:comfortable throughout block  Complications:no     XR Chest AP    Result Date: 5/15/2022  Narrative: PORTABLE CHEST 05/15/2022 AT 1140 HOURS  CLINICAL HISTORY: Cough. Fever.  Compared to the previous chest dated 04/26/2022.  The lungs are well-expanded and appear free of focal infiltrates. There are no pleural effusions. The heart is normal in size. The thoracic aorta is mildly tortuous.  IMPRESSIONS: No evidence of active disease within the chest.  This report was finalized on 5/15/2022 12:03 PM by Dr. Cam Krueger M.D.      XR Chest PA & Lateral    Result Date: 4/26/2022  Narrative: PA AND LATERAL CHEST  CLINICAL HISTORY: Preop chest x-ray  The lungs are well-expanded and appear free of infiltrates or masses. There are no pleural effusions. The thoracic aorta is moderately ectatic and tortuous. The heart appears within normal limits in size.  IMPRESSIONS: No evidence of acute disease within the chest.  This report was finalized on 4/26/2022 8:51 PM by Dr. Cam Krueger M.D.        Assesment/Plan:    Procedures:  Right TKA  Postoperative Day: 6  Weightbearing Status:  WBAT with walker  DVT Prophylaxis:  ASA for DVT prophylaxis    Disposition:  Patients right knee shows no signs of infection.  Await doppler study to r/o dvt, if negative, continue  mg twice daily.  Low grade temp likely secondary to atelectasis, continue IS.  Home any time from an Ortho standpoint.      Jean Kasper  [facility]  [address] [place] [address]  [phone]    5/16/2022

## 2022-05-16 NOTE — PLAN OF CARE
Goal Outcome Evaluation:  Plan of Care Reviewed With: patient           Outcome Evaluation: Pt is a 70 y/o F admitted to [facility] with recent confusion. Recent R TKA on 5/10, pt was home and states she was having a friend stay with her. States she has 2 HUGO, no stairs, and has been using RW. Pt today was A&O x 4, but seemed to have difficulty with answering questions about PLOF and difficulty with sequencing for getting OOB. Pt was SBA with inc'd time needed for sup<>sit, Mica for STS, and CGA to ambulate 30' with RW. Gait likmited d/t pt needing to be transported to vascular. Pt demo's dec'd strength, endurance, and balance and will benefit from skilled PT intervention. Rec D/C home with 24/7 assist and continue HH.

## 2022-05-16 NOTE — DISCHARGE PLACEMENT REQUEST
"Geraldine Holloway (71 y.o. Female)             Date of Birth   1950    Social Security Number       Address   [address] [address]    Home Phone   [phone]    MRN   [id number]       Mormon   Sikh    Marital Status                               Admission Date   5/15/22    Admission Type   Emergency    Admitting Provider   Moreno Brito MD    Attending Provider   Etta Dee DO    Department, Room/Bed   [address], [address]/1       Discharge Date       Discharge Disposition       Discharge Destination                               Attending Provider: Etta Dee DO    Allergies: Iodine, Phenytoin    Isolation: None   Infection: None   Code Status: CPR   Advance Care Planning Activity    Ht: 162.6 cm (64\")   Wt: 100 kg (220 lb 7.4 oz)    Admission Cmt: None   Principal Problem: Metabolic encephalopathy [G93.41]                 Active Insurance as of 5/15/2022     Primary Coverage     Payor Plan Insurance Group Employer/Plan Group    HUMANA MEDICARE REPLACEMENT HUMANA MEDICARE REPLACEMENT [id number]     Payor Plan Address Payor Plan Phone Number Payor Plan Fax Number Effective Dates    [address] [address] [phone]  1/1/2022 - None Entered    [facility] [id number]       Subscriber Name Subscriber Birth Date Member ID       GERALDINE HOLLOWAY 1950 [id number]                 Emergency Contacts      (Rel.) Home Phone Work Phone Mobile Phone    LeeAgustin (Son) [phone] -- [phone]    Mila Rojas (Sister) -- -- [phone]    Colleen Arellano (Sister) -- -- [phone]            "

## 2022-05-17 VITALS
SYSTOLIC BLOOD PRESSURE: 141 MMHG | RESPIRATION RATE: 18 BRPM | WEIGHT: 220.46 LBS | TEMPERATURE: 97.6 F | BODY MASS INDEX: 37.84 KG/M2 | OXYGEN SATURATION: 97 % | DIASTOLIC BLOOD PRESSURE: 69 MMHG | HEART RATE: 71 BPM

## 2022-05-17 LAB
ANION GAP SERPL CALCULATED.3IONS-SCNC: 11.4 MMOL/L (ref 5–15)
BASOPHILS # BLD AUTO: 0.04 10*3/MM3 (ref 0–0.2)
BASOPHILS NFR BLD AUTO: 0.8 % (ref 0–1.5)
BUN SERPL-MCNC: 17 MG/DL (ref 8–23)
BUN/CREAT SERPL: 19.5 (ref 7–25)
CALCIUM SPEC-SCNC: 9.1 MG/DL (ref 8.6–10.5)
CHLORIDE SERPL-SCNC: 111 MMOL/L (ref 98–107)
CO2 SERPL-SCNC: 21.6 MMOL/L (ref 22–29)
CREAT SERPL-MCNC: 0.87 MG/DL (ref 0.57–1)
DEPRECATED RDW RBC AUTO: 40.8 FL (ref 37–54)
EGFRCR SERPLBLD CKD-EPI 2021: 70.9 ML/MIN/1.73
EOSINOPHIL # BLD AUTO: 0.14 10*3/MM3 (ref 0–0.4)
EOSINOPHIL NFR BLD AUTO: 2.6 % (ref 0.3–6.2)
ERYTHROCYTE [DISTWIDTH] IN BLOOD BY AUTOMATED COUNT: 12.4 % (ref 12.3–15.4)
GLUCOSE SERPL-MCNC: 115 MG/DL (ref 65–99)
HCT VFR BLD AUTO: 28.9 % (ref 34–46.6)
HGB BLD-MCNC: 10 G/DL (ref 12–15.9)
IMM GRANULOCYTES # BLD AUTO: 0.07 10*3/MM3 (ref 0–0.05)
IMM GRANULOCYTES NFR BLD AUTO: 1.3 % (ref 0–0.5)
LYMPHOCYTES # BLD AUTO: 1.22 10*3/MM3 (ref 0.7–3.1)
LYMPHOCYTES NFR BLD AUTO: 22.9 % (ref 19.6–45.3)
MAGNESIUM SERPL-MCNC: 2 MG/DL (ref 1.6–2.4)
MCH RBC QN AUTO: 31.3 PG (ref 26.6–33)
MCHC RBC AUTO-ENTMCNC: 34.6 G/DL (ref 31.5–35.7)
MCV RBC AUTO: 90.6 FL (ref 79–97)
MONOCYTES # BLD AUTO: 0.23 10*3/MM3 (ref 0.1–0.9)
MONOCYTES NFR BLD AUTO: 4.3 % (ref 5–12)
NEUTROPHILS NFR BLD AUTO: 3.63 10*3/MM3 (ref 1.7–7)
NEUTROPHILS NFR BLD AUTO: 68.1 % (ref 42.7–76)
NRBC BLD AUTO-RTO: 0 /100 WBC (ref 0–0.2)
PHOSPHATE SERPL-MCNC: 1.8 MG/DL (ref 2.5–4.5)
PLATELET # BLD AUTO: 231 10*3/MM3 (ref 140–450)
PMV BLD AUTO: 8.9 FL (ref 6–12)
POTASSIUM SERPL-SCNC: 3.9 MMOL/L (ref 3.5–5.2)
RBC # BLD AUTO: 3.19 10*6/MM3 (ref 3.77–5.28)
SODIUM SERPL-SCNC: 144 MMOL/L (ref 136–145)
WBC NRBC COR # BLD: 5.33 10*3/MM3 (ref 3.4–10.8)

## 2022-05-17 PROCEDURE — 84100 ASSAY OF PHOSPHORUS: CPT | Performed by: STUDENT IN AN ORGANIZED HEALTH CARE EDUCATION/TRAINING PROGRAM

## 2022-05-17 PROCEDURE — 96361 HYDRATE IV INFUSION ADD-ON: CPT

## 2022-05-17 PROCEDURE — 83735 ASSAY OF MAGNESIUM: CPT | Performed by: STUDENT IN AN ORGANIZED HEALTH CARE EDUCATION/TRAINING PROGRAM

## 2022-05-17 PROCEDURE — 80048 BASIC METABOLIC PNL TOTAL CA: CPT | Performed by: STUDENT IN AN ORGANIZED HEALTH CARE EDUCATION/TRAINING PROGRAM

## 2022-05-17 PROCEDURE — G0378 HOSPITAL OBSERVATION PER HR: HCPCS

## 2022-05-17 PROCEDURE — 25010000002 ENOXAPARIN PER 10 MG: Performed by: HOSPITALIST

## 2022-05-17 PROCEDURE — 96372 THER/PROPH/DIAG INJ SC/IM: CPT

## 2022-05-17 PROCEDURE — 85025 COMPLETE CBC W/AUTO DIFF WBC: CPT | Performed by: STUDENT IN AN ORGANIZED HEALTH CARE EDUCATION/TRAINING PROGRAM

## 2022-05-17 PROCEDURE — 25010000002 SODIUM CHLORIDE 0.9 % WITH KCL 20 MEQ 20-0.9 MEQ/L-% SOLUTION: Performed by: HOSPITALIST

## 2022-05-17 RX ORDER — HYDROCODONE BITARTRATE AND ACETAMINOPHEN 5; 325 MG/1; MG/1
1 TABLET ORAL EVERY 6 HOURS PRN
Qty: 12 TABLET | Refills: 0 | Status: SHIPPED | OUTPATIENT
Start: 2022-05-17 | End: 2022-05-20

## 2022-05-17 RX ADMIN — Medication 10 ML: at 09:59

## 2022-05-17 RX ADMIN — ACETAMINOPHEN 500 MG: 500 TABLET ORAL at 12:39

## 2022-05-17 RX ADMIN — SODIUM PHOSPHATE, MONOBASIC, MONOHYDRATE AND SODIUM PHOSPHATE, DIBASIC, ANHYDROUS 40 MMOL: 276; 142 INJECTION, SOLUTION INTRAVENOUS at 13:15

## 2022-05-17 RX ADMIN — AZELASTINE HYDROCHLORIDE 2 SPRAY: 137 SPRAY, METERED NASAL at 09:59

## 2022-05-17 RX ADMIN — POTASSIUM CHLORIDE AND SODIUM CHLORIDE 100 ML/HR: 900; 150 INJECTION, SOLUTION INTRAVENOUS at 11:04

## 2022-05-17 RX ADMIN — DOCUSATE SODIUM 100 MG: 100 CAPSULE, LIQUID FILLED ORAL at 09:59

## 2022-05-17 RX ADMIN — LEVOTHYROXINE SODIUM 112 MCG: 0.11 TABLET ORAL at 05:45

## 2022-05-17 RX ADMIN — ACETAMINOPHEN 500 MG: 500 TABLET ORAL at 17:00

## 2022-05-17 RX ADMIN — LAMOTRIGINE 100 MG: 100 TABLET ORAL at 09:59

## 2022-05-17 RX ADMIN — ACETAMINOPHEN 500 MG: 500 TABLET ORAL at 09:59

## 2022-05-17 RX ADMIN — ENOXAPARIN SODIUM 40 MG: 100 INJECTION SUBCUTANEOUS at 09:59

## 2022-05-17 RX ADMIN — CLONAZEPAM 0.5 MG: 0.5 TABLET ORAL at 16:59

## 2022-05-17 RX ADMIN — LEVETIRACETAM 1500 MG: 500 TABLET, FILM COATED ORAL at 09:59

## 2022-05-17 RX ADMIN — CLONAZEPAM 0.5 MG: 0.5 TABLET ORAL at 09:59

## 2022-05-17 NOTE — PLAN OF CARE
Goal Outcome Evaluation:              Outcome Evaluation: A&Ox4, confused at times. RA. Phosphorus low, replacement infusing. Up in chair. Right leg dsg dry and intact, pedal pulses 2+. D/c today after infusion. VSS. Will continue to monitor.

## 2022-05-17 NOTE — CASE MANAGEMENT/SOCIAL WORK
Continued Stay Note  [facility]     Patient Name: Geraldnie Holloway  MRN: [id number]  Today's Date: 5/17/2022    Admit Date: 5/15/2022     Discharge Plan     Row Name 05/17/22 1046       Plan    Plan Comments DC orders in EPIC.  Spoke with patient at bedside and she confirms that she will return home with Religious .  She has a friend that will be coming to  her up about noon.  Message left at [phone] to notify Religious  of dc. Gracy Gomez RN               Discharge Codes    No documentation.               Expected Discharge Date and Time     Expected Discharge Date Expected Discharge Time    May 17, 2022             Gracy Gomez RN

## 2022-05-17 NOTE — PROGRESS NOTES
[facility] current with PT following RTK on 5/10.  Patient remained observation status and we will continue to follow per careplan for D/C needs.

## 2022-05-17 NOTE — SIGNIFICANT NOTE
05/17/22 1141   OTHER   Discipline physical therapist   Rehab Time/Intention   Session Not Performed other (see comments)  (noted DC plans.  Pt eval'd yesterday.  OK for DC from PT standpoint.  Has equip at home.  Will f/u w/ HHPT.  No further PT planned before DC to home.)   Recommendation   PT - Next Appointment 05/18/22

## 2022-05-17 NOTE — DISCHARGE SUMMARY
Patient Name: Geraldine Holloway  : 1950  MRN: [id number]    Date of Admission: 5/15/2022  Date of Discharge:  2022  Primary Care Physician: Verito Pulido MD      Chief Complaint:   Altered Mental Status, Fever, and Dysuria      Discharge Diagnoses     Active Hospital Problems    Diagnosis  POA   • **Metabolic encephalopathy [G93.41]  Yes   • TERESE (acute kidney injury) (HCC) [N17.9]  Yes   • Polypharmacy [Z79.899]  Not Applicable   • Benzodiazepine dependence (HCC) [F13.20]  Yes   • Postoperative fever [R50.82]  Yes   • Atelectasis [J98.11]  Yes   • Dehydration [E86.0]  Yes   • Hypokalemia [E87.6]  Yes   • History of total knee arthroplasty [Z96.659]  Not Applicable   • Essential hypertension [I10]  Yes      Resolved Hospital Problems   No resolved problems to display.        Hospital Course     Ms. Holloway is a 71 y.o. female with history of seizure disorder, anxiety and benzodiazepine dependence who presented with altered mental status and fever.  She had right total knee arthroplasty 6 days prior.  She was found to have metabolic encephalopathy due to Percocet use, postoperative fever due to atelectasis and TERESE due to poor p.o. intake.     Acute metabolic encephalopathy: Due to polypharmacy in setting of chronic benzodiazepine use and postoperative Percocet use.  Mental status returned to baseline shortly after admission.  Recommend stop Percocet on discharge, changed to [place] as needed and counseled on sparing use.  Counseled on using incentive spirometry and out of bed ambulation.     Post operative fever, due to atelectasis: Infectious work-up was unremarkable.  Negative urinalysis, chest x-ray, and blood cultures.  Lower extremity ultrasound was negative for DVT.  Ortho evaluated her knee and are not concerned for infection.    TERESE, due to poor p.o. intake: Creatinine peaked at 1.2, baseline 0.8. Resolved with fluids     Status post right total knee arthroplasty: Resume aspirin 325 mg  twice daily for DVT prophylaxis     Seizure disorder: Evaluated by neurology, no medication changes.  Continue Lamictal, Keppra and Zonegran    At the time of discharge patient was told to take all medications as prescribed, keep all follow-up appointments, and call their doctor or return to the hospital with any worsening or concerning symptoms.    Day of Discharge     Subjective:  Doing well this morning.  No further fevers.  No infectious symptoms, ready to go home.    Review of Systems   Constitutional: Negative for chills and fever.   Respiratory: Negative for cough and shortness of breath.    Cardiovascular: Negative for chest pain and palpitations.   Gastrointestinal: Negative for nausea and vomiting.       Physical Exam:  Temp:  [97.6 °F (36.4 °C)-98.8 °F (37.1 °C)] 97.6 °F (36.4 °C)  Heart Rate:  [68-84] 68  Resp:  [18] 18  BP: (141-174)/() 141/69  Body mass index is 37.84 kg/m².  Physical Exam  Constitutional:       General: She is not in acute distress.     Appearance: She is not ill-appearing.   Cardiovascular:      Rate and Rhythm: Normal rate and regular rhythm.   Pulmonary:      Effort: Pulmonary effort is normal. No respiratory distress.      Breath sounds: No wheezing.   Abdominal:      Palpations: Abdomen is soft.      Tenderness: There is no abdominal tenderness. There is no guarding.   Musculoskeletal:      Comments: Right lower extremity with nonpitting edema.  Surgical dressing clean and dry, no discharge   Neurological:      General: No focal deficit present.      Mental Status: She is alert and oriented to person, place, and time.      Cranial Nerves: No cranial nerve deficit.   Psychiatric:         Mood and Affect: Mood normal.         Consultants     Consult Orders (all) (From admission, onward)     Start     Ordered    05/16/22 0702  Inpatient Orthopedic Surgery Consult  IN AM        Specialty:  Orthopedic Surgery  Provider:  Jean Burroughs MD    05/15/22 1807    05/15/22 1817   Inpatient Neurology Consult General  Once        Specialty:  Neurology  Provider:  Dave Solomon MD    05/15/22 1817    05/15/22 1631  Inpatient Case Management  Consult  Once        Provider:  (Not yet assigned)    05/15/22 1630    05/15/22 1313  [facility] (on-call MD unless specified) Details  Once        Specialty:  Hospitalist  Provider:  (Not yet assigned)    05/15/22 1312              Procedures     Imaging Results (All)     Procedure Component Value Units Date/Time    XR Chest AP [[id number]] Collected: 05/15/22 1203     Updated: 05/15/22 1206    Narrative:      PORTABLE CHEST 05/15/2022 AT 1140 HOURS     CLINICAL HISTORY: Cough. Fever.     Compared to the previous chest dated 04/26/2022.     The lungs are well-expanded and appear free of focal infiltrates. There  are no pleural effusions. The heart is normal in size. The thoracic  aorta is mildly tortuous.     IMPRESSIONS: No evidence of active disease within the chest.     This report was finalized on 5/15/2022 12:03 PM by Dr. Cam Krueger M.D.             Pertinent Labs     Results from last 7 days   Lab Units 05/17/22  0953 05/16/22  1125 05/15/22  1052 05/11/22  0450   WBC 10*3/mm3 5.33 7.95 10.93* 6.26   HEMOGLOBIN g/dL 10.0* 10.8* 10.6* 11.3*   PLATELETS 10*3/mm3 231 237 249 169     Results from last 7 days   Lab Units 05/17/22  0953 05/16/22  1125 05/15/22  1052 05/11/22  0450   SODIUM mmol/L 144 139 141 140   POTASSIUM mmol/L 3.9 3.4* 3.2* 3.1*   CHLORIDE mmol/L 111* 107 106 104   CO2 mmol/L 21.6* 24.0 25.0 25.0   BUN mg/dL 17 22 26* 16   CREATININE mg/dL 0.87 0.95 1.22* 0.94   GLUCOSE mg/dL 115* 102* 118* 113*   Estimated Creatinine Clearance: 67.2 mL/min (by C-G formula based on SCr of 0.87 mg/dL).  Results from last 7 days   Lab Units 05/16/22  1125 05/15/22  1052   ALBUMIN g/dL 3.00* 2.90*   BILIRUBIN mg/dL  --  1.2   ALK PHOS U/L  --  125*   AST (SGOT) U/L  --  19   ALT (SGPT) U/L  --  12     Results from last 7 days   Lab  Units 05/17/22  0953 05/16/22  1125 05/15/22  1052 05/11/22  0450   CALCIUM mg/dL 9.1 8.6 8.7 8.3*   ALBUMIN g/dL  --  3.00* 2.90*  --    MAGNESIUM mg/dL 2.0  --   --   --    PHOSPHORUS mg/dL 1.8* 2.0*  --   --                Invalid input(s): LDLCALC  Results from last 7 days   Lab Units 05/15/22  1132   BLOODCX  No growth at 2 days  No growth at 2 days       Test Results Pending at Discharge     Pending Labs     Order Current Status    Blood Culture - Blood, Arm, Left Preliminary result    Blood Culture - Blood, Arm, Right Preliminary result          Discharge Details        Discharge Medications      New Medications      Instructions Start Date   HYDROcodone-acetaminophen 5-325 MG per tablet  Commonly known as: NORCO   1 tablet, Oral, Every 6 Hours PRN      Phospha 250 Neutral 155-852-130 MG tablet  Generic drug: phosphorus   1 tablet, Oral, 2 Times Daily         Continue These Medications      Instructions Start Date   aspirin  MG tablet   325 mg, Oral, 2 Times Daily With Meals      atenolol 50 MG tablet  Commonly known as: TENORMIN   50 mg, Oral, Nightly      azelastine 0.1 % nasal spray  Commonly known as: ASTELIN   2 sprays, Nasal, 2 Times Daily, Use in each nostril as directed      Calcium Citrate-Vitamin D 250-200 MG-UNIT tablet   1 tablet, Oral, Daily      clonazePAM 0.5 MG tablet  Commonly known as: KlonoPIN   0.5 mg, Oral, 3 Times Daily      docusate sodium 250 MG capsule  Commonly known as: COLACE   250 mg, Oral, 2 Times Daily PRN      FISH OIL PO   1 capsule, Oral, Every Morning, HOLD PRIOR TO SURG      lamoTRIgine 100 MG tablet  Commonly known as: LaMICtal   100 mg, Oral, 2 Times Daily      levETIRAcetam 1000 MG tablet  Commonly known as: KEPPRA   1,500 mg, Oral, 2 Times Daily      levothyroxine 112 MCG tablet  Commonly known as: SYNTHROID, LEVOTHROID   112 mcg, Oral, Every Early Morning      losartan-hydrochlorothiazide 100-12.5 MG per tablet  Commonly known as: HYZAAR   1 tablet, Oral, Every  Morning      magnesium oxide 400 tablet tablet  Commonly known as: MAG-OX   400 mg, Oral, Daily      multivitamin tablet tablet  Commonly known as: THERAGRAN   1 tablet, Oral, Daily      potassium chloride 10 MEQ CR tablet   10 mEq, Oral, Daily      prochlorperazine 5 MG tablet  Commonly known as: COMPAZINE   5 mg, Oral, Every 6 Hours PRN      rosuvastatin 10 MG tablet  Commonly known as: CRESTOR   10 mg, Oral, Nightly      sertraline 100 MG tablet  Commonly known as: ZOLOFT   Oral, TAKES 50 MG AT AM  MG HS      ubrogepant 50 MG tablet  Commonly known as: UBRELVY   50 mg, Oral, 1 tab at start of migraine headache and may repeat      VITAMIN B-12 PO   1,000 mcg, Oral, Every Morning, HOLD PRIOR TO SURG      zonisamide 100 MG capsule  Commonly known as: ZONEGRAN   200 mg, Oral, Nightly         Stop These Medications    diclofenac 75 MG EC tablet  Commonly known as: VOLTAREN     oxyCODONE-acetaminophen 5-325 MG per tablet  Commonly known as: PERCOCET            Allergies   Allergen Reactions   • Iodine Rash   • Phenytoin Rash         Discharge Disposition:  Home-Health Care [facility]    Discharge Diet:  Diet Order   Procedures   • Diet Regular       Discharge Activity:   As tolerated    CODE STATUS:    Code Status and Medical Interventions:   Ordered at: 05/15/22 6014     Level Of Support Discussed With:    Patient     Code Status (Patient has no pulse and is not breathing):    CPR (Attempt to Resuscitate)     Medical Interventions (Patient has pulse or is breathing):    Full Support       Future Appointments   Date Time Provider Department Center   5/18/2022 12:45 PM Osmel Guevara [facility] None   5/19/2022 To Be Determined Osmel Guevara [facility] None   5/20/2022 To Be Determined Osmel Guevara [facility] None   5/24/2022 To Be Determined Osmel Guevara [facility] None      Contact information for follow-up providers     Verito Pulido MD. Schedule an appointment as soon as possible for a visit in  1 week(s).    Specialty: Family Medicine  Contact information:  [address] [facility] [address]  [phone]                   Contact information for after-discharge care     Home Medical Care     [facility] .    Service: Home Health Services  Contact information:  [address]  [facility] [address] [phone]                             Time Spent on Discharge:  Greater than 30 minutes      Etta Dee DO  [facility]  05/17/22  10:56 EDT

## 2022-05-18 ENCOUNTER — HOME CARE VISIT (OUTPATIENT)
Dept: HOME HEALTH SERVICES | Facility: HOME HEALTHCARE | Age: 72
End: 2022-05-18

## 2022-05-18 VITALS
TEMPERATURE: 96.8 F | SYSTOLIC BLOOD PRESSURE: 146 MMHG | DIASTOLIC BLOOD PRESSURE: 88 MMHG | OXYGEN SATURATION: 94 % | HEART RATE: 63 BPM

## 2022-05-18 PROCEDURE — G0151 HHCP-SERV OF PT,EA 15 MIN: HCPCS

## 2022-05-18 NOTE — HOME HEALTH
Patient was in hospital for last several days under observation stay for metabolic encephalopathy and low grade fever.  Patient was taken off oxycodone-acetaminophen and started on Norco, she was also started on phosphorus and was discontinued from voltaren.    Plan for next visit:  -transfer training  -gait training with rolling walker  -education on HEP with progressions as appropriate  -stair mobility training  -standing balance exercises

## 2022-05-18 NOTE — CASE MANAGEMENT/SOCIAL WORK
Case Management Discharge Note      Final Note: DC'd home 5/17 with [facility] following             Home Medical Care Coordination complete.    Service Provider Selected Services Address Phone Fax Patient Preferred    [facility]  [facility] [address] [address] [phone] --                Selected Continued Care - Prior Encounters Includes selections from prior encounters from 2/14/2022 to 5/17/2022    Discharged on 5/11/2022 Admission date: 5/10/2022 - Discharge disposition: Home or Self Care    Home Medical Care     Service Provider Selected Services Address Phone Fax Patient Preferred     [facility]  [facility] [address] [address] [phone] --                    Transportation Services  Private: Car    Final Discharge Disposition Code: 06 - home with home health care

## 2022-05-19 ENCOUNTER — HOME CARE VISIT (OUTPATIENT)
Dept: HOME HEALTH SERVICES | Facility: HOME HEALTHCARE | Age: 72
End: 2022-05-19

## 2022-05-19 VITALS
DIASTOLIC BLOOD PRESSURE: 86 MMHG | HEART RATE: 74 BPM | SYSTOLIC BLOOD PRESSURE: 148 MMHG | OXYGEN SATURATION: 94 % | TEMPERATURE: 97.4 F

## 2022-05-19 PROCEDURE — G0151 HHCP-SERV OF PT,EA 15 MIN: HCPCS

## 2022-05-19 NOTE — HOME HEALTH
R knee surgical incision healing well with no active draiange and no signs of infection.  Patient/friend report no falls or changes in medication since last visit.    Plan for next visit:  -transfer training  -gait training with rolling walker  -education on HEP with progressions as appropriate  -stair mobility training  -standing balance exercises

## 2022-05-20 ENCOUNTER — HOME CARE VISIT (OUTPATIENT)
Dept: HOME HEALTH SERVICES | Facility: HOME HEALTHCARE | Age: 72
End: 2022-05-20

## 2022-05-20 VITALS
SYSTOLIC BLOOD PRESSURE: 148 MMHG | DIASTOLIC BLOOD PRESSURE: 92 MMHG | TEMPERATURE: 98.2 F | HEART RATE: 75 BPM | OXYGEN SATURATION: 95 %

## 2022-05-20 LAB
BACTERIA SPEC AEROBE CULT: NORMAL
BACTERIA SPEC AEROBE CULT: NORMAL

## 2022-05-20 PROCEDURE — G0151 HHCP-SERV OF PT,EA 15 MIN: HCPCS

## 2022-05-20 NOTE — HOME HEALTH
R knee surgical incision healing well with no activie drainage and no signs of infection.  Patient/friend report no falls or changes in medication since last visit.    Plan for next visit:  -transfer training  -gait training with rolling walker/cane as appropriate  -education on HEP with progressions as appropriate  -stair mobility training  -standing balance exercises    PT discharge next visit, staple removal next visit.

## 2022-05-24 ENCOUNTER — HOME CARE VISIT (OUTPATIENT)
Dept: HOME HEALTH SERVICES | Facility: HOME HEALTHCARE | Age: 72
End: 2022-05-24

## 2022-05-24 VITALS
TEMPERATURE: 97.8 F | HEART RATE: 61 BPM | OXYGEN SATURATION: 97 % | SYSTOLIC BLOOD PRESSURE: 148 MMHG | DIASTOLIC BLOOD PRESSURE: 90 MMHG

## 2022-05-24 PROCEDURE — G0151 HHCP-SERV OF PT,EA 15 MIN: HCPCS

## 2022-05-24 NOTE — HOME HEALTH
R knee surgical incision healing well, staples removed with steri-strips placed.  No signs of infection.

## 2023-02-21 RX ORDER — SERTRALINE HYDROCHLORIDE 100 MG/1
TABLET, FILM COATED ORAL
Qty: 135 TABLET | Refills: 1 | Status: SHIPPED | OUTPATIENT
Start: 2023-02-21

## 2023-03-15 RX ORDER — DONEPEZIL HYDROCHLORIDE 10 MG/1
10 TABLET, FILM COATED ORAL DAILY
Qty: 90 TABLET | Refills: 0 | Status: SHIPPED | OUTPATIENT
Start: 2023-03-15

## 2023-03-15 RX ORDER — MEMANTINE HYDROCHLORIDE 10 MG/1
10 TABLET ORAL 2 TIMES DAILY
Qty: 180 TABLET | Refills: 0 | Status: SHIPPED | OUTPATIENT
Start: 2023-03-15

## 2023-03-27 RX ORDER — AZELASTINE 1 MG/ML
SPRAY, METERED NASAL
Qty: 30 ML | Refills: 5 | Status: SHIPPED | OUTPATIENT
Start: 2023-03-27

## 2023-03-27 RX ORDER — FREMANEZUMAB-VFRM 225 MG/1.5ML
INJECTION SUBCUTANEOUS
COMMUNITY
Start: 2023-01-08

## 2023-03-27 NOTE — TELEPHONE ENCOUNTER
Rx Refill Note  Requested Prescriptions     Pending Prescriptions Disp Refills   • azelastine (ASTELIN) 0.1 % nasal spray [Pharmacy Med Name: AZELASTINE HCL 0.1 % SOLN 0.1 Solution] 30 mL 5     Sig: INSTILL 2 SPRAYS INTO EACH NOSTRIL TWO TIMES A DAY      Last office visit with prescribing clinician: 01/30/2023   Next office visit with prescribing clinician: 5/1/2023   Last Labs Done: 11/01/2022 (Information found in [facility])    Gal Bell CMA  03/27/23, 15:54 EDT

## 2023-05-01 ENCOUNTER — OFFICE VISIT (OUTPATIENT)
Dept: FAMILY MEDICINE CLINIC | Facility: CLINIC | Age: 73
End: 2023-05-01
Payer: MEDICARE

## 2023-05-01 VITALS
SYSTOLIC BLOOD PRESSURE: 143 MMHG | RESPIRATION RATE: 20 BRPM | TEMPERATURE: 98.6 F | DIASTOLIC BLOOD PRESSURE: 86 MMHG | OXYGEN SATURATION: 92 % | BODY MASS INDEX: 35.45 KG/M2 | HEART RATE: 64 BPM | WEIGHT: 200.1 LBS | HEIGHT: 63 IN

## 2023-05-01 DIAGNOSIS — F33.41 RECURRENT MAJOR DEPRESSIVE DISORDER, IN PARTIAL REMISSION: Primary | ICD-10-CM

## 2023-05-01 DIAGNOSIS — G30.9 MIXED DEMENTIA: ICD-10-CM

## 2023-05-01 DIAGNOSIS — F01.50 MIXED DEMENTIA: ICD-10-CM

## 2023-05-01 DIAGNOSIS — I10 ESSENTIAL HYPERTENSION: ICD-10-CM

## 2023-05-01 DIAGNOSIS — E78.2 MIXED HYPERLIPIDEMIA: ICD-10-CM

## 2023-05-01 DIAGNOSIS — F02.80 MIXED DEMENTIA: ICD-10-CM

## 2023-05-01 DIAGNOSIS — E03.9 ACQUIRED HYPOTHYROIDISM: ICD-10-CM

## 2023-05-01 PROBLEM — R09.81 SINUS CONGESTION: Status: ACTIVE | Noted: 2023-05-01

## 2023-05-01 PROBLEM — J98.11 ATELECTASIS: Status: RESOLVED | Noted: 2022-05-15 | Resolved: 2023-05-01

## 2023-05-01 PROBLEM — R07.2 PRECORDIAL PAIN: Status: RESOLVED | Noted: 2017-03-13 | Resolved: 2023-05-01

## 2023-05-01 PROBLEM — R50.82 POSTOPERATIVE FEVER: Status: RESOLVED | Noted: 2022-05-15 | Resolved: 2023-05-01

## 2023-05-01 RX ORDER — MEMANTINE HYDROCHLORIDE 10 MG/1
TABLET ORAL
COMMUNITY
Start: 2023-03-15

## 2023-05-01 RX ORDER — UBIDECARENONE 100 MG
200 CAPSULE ORAL DAILY
COMMUNITY

## 2023-05-01 RX ORDER — ATENOLOL 50 MG/1
100 TABLET ORAL
COMMUNITY
Start: 2023-01-02

## 2023-05-01 NOTE — PROGRESS NOTES
"Subjective     Geraldine Holloway is a 72 y.o. female who presents with   Chief Complaint   Patient presents with   • Hypertension   • Hyperlipidemia   • Hypothyroidism   • Depression       History of Present Illness     Here for routine follow up of hypertension, hyperlipidemia, hypothyroidism and depression.  She's been diagnosed with dementia but she has a new eval pending because she thinks her testing is thrown off by her prior stroke and Aphasia vs declining memory.  She's had stress with her son and his bipolar disorder and he is difficult to deal with. She's here with her sister.     The following data was reviewed by: Verito Pulido MD on 05/01/2023:       Labs and recent visits reviewed in [facility] EHR    Review of Systems     Objective     /86 (BP Location: Right arm, Patient Position: Sitting, Cuff Size: Large Adult)   Pulse 64   Temp 98.6 °F (37 °C) (Oral)   Resp 20   Ht 160 cm (63\")   Wt 90.8 kg (200 lb 1.6 oz)   SpO2 92%   Breastfeeding No   BMI 35.45 kg/m²     Physical Exam  Constitutional:       Appearance: Normal appearance.   Cardiovascular:      Rate and Rhythm: Normal rate and regular rhythm.      Heart sounds: Normal heart sounds.      Comments: No carotid bruit  Pulmonary:      Effort: Pulmonary effort is normal.      Breath sounds: Normal breath sounds.   Neurological:      Mental Status: She is alert.   Psychiatric:         Behavior: Behavior normal.         Procedures     Assessment & Plan   Diagnoses and all orders for this visit:    1. Recurrent major depressive disorder, in partial remission (Primary)  Assessment & Plan:  Push Zoloft to 200mg.      2. Essential hypertension  -     Comprehensive Metabolic Panel  -     CBC & Differential    3. Mixed hyperlipidemia  -     Comprehensive Metabolic Panel  -     Lipid Panel    4. Acquired hypothyroidism  -     TSH    5. Mixed dementia  Assessment & Plan:  On Aricept and Namenda but questions if this diagnosis is accurate with " her aphasia history. Follow through with the second opinion.         Discussion    Patient Instructions   I have ordered lab tests today.  You should receive a phone call or a MyChart message with those results.  If you have not heard from us in 7-10 days, please call the office.                  Verito Pulido MD

## 2023-05-02 LAB
ALBUMIN SERPL-MCNC: 4.7 G/DL (ref 3.7–4.7)
ALBUMIN/GLOB SERPL: 1.9 {RATIO} (ref 1.2–2.2)
ALP SERPL-CCNC: 96 IU/L (ref 44–121)
ALT SERPL-CCNC: 16 IU/L (ref 0–32)
AST SERPL-CCNC: 19 IU/L (ref 0–40)
BASOPHILS # BLD AUTO: 0.1 X10E3/UL (ref 0–0.2)
BASOPHILS NFR BLD AUTO: 1 %
BILIRUB SERPL-MCNC: 0.8 MG/DL (ref 0–1.2)
BUN SERPL-MCNC: 13 MG/DL (ref 8–27)
BUN/CREAT SERPL: 13 (ref 12–28)
CALCIUM SERPL-MCNC: 9.7 MG/DL (ref 8.7–10.3)
CHLORIDE SERPL-SCNC: 105 MMOL/L (ref 96–106)
CHOLEST SERPL-MCNC: 183 MG/DL (ref 100–199)
CO2 SERPL-SCNC: 22 MMOL/L (ref 20–29)
CREAT SERPL-MCNC: 0.98 MG/DL (ref 0.57–1)
EGFRCR SERPLBLD CKD-EPI 2021: 61 ML/MIN/1.73
EOSINOPHIL # BLD AUTO: 0.1 X10E3/UL (ref 0–0.4)
EOSINOPHIL NFR BLD AUTO: 2 %
ERYTHROCYTE [DISTWIDTH] IN BLOOD BY AUTOMATED COUNT: 12.5 % (ref 11.7–15.4)
GLOBULIN SER CALC-MCNC: 2.5 G/DL (ref 1.5–4.5)
GLUCOSE SERPL-MCNC: 104 MG/DL (ref 70–99)
HCT VFR BLD AUTO: 46.5 % (ref 34–46.6)
HDLC SERPL-MCNC: 74 MG/DL
HGB BLD-MCNC: 15.7 G/DL (ref 11.1–15.9)
IMM GRANULOCYTES # BLD AUTO: 0 X10E3/UL (ref 0–0.1)
IMM GRANULOCYTES NFR BLD AUTO: 1 %
LDLC SERPL CALC-MCNC: 73 MG/DL (ref 0–99)
LYMPHOCYTES # BLD AUTO: 2.1 X10E3/UL (ref 0.7–3.1)
LYMPHOCYTES NFR BLD AUTO: 32 %
MCH RBC QN AUTO: 30 PG (ref 26.6–33)
MCHC RBC AUTO-ENTMCNC: 33.8 G/DL (ref 31.5–35.7)
MCV RBC AUTO: 89 FL (ref 79–97)
MONOCYTES # BLD AUTO: 0.4 X10E3/UL (ref 0.1–0.9)
MONOCYTES NFR BLD AUTO: 6 %
NEUTROPHILS # BLD AUTO: 3.8 X10E3/UL (ref 1.4–7)
NEUTROPHILS NFR BLD AUTO: 58 %
PLATELET # BLD AUTO: 248 X10E3/UL (ref 150–450)
POTASSIUM SERPL-SCNC: 4.2 MMOL/L (ref 3.5–5.2)
PROT SERPL-MCNC: 7.2 G/DL (ref 6–8.5)
RBC # BLD AUTO: 5.23 X10E6/UL (ref 3.77–5.28)
SODIUM SERPL-SCNC: 145 MMOL/L (ref 134–144)
TRIGL SERPL-MCNC: 224 MG/DL (ref 0–149)
TSH SERPL DL<=0.005 MIU/L-ACNC: 2 UIU/ML (ref 0.45–4.5)
VLDLC SERPL CALC-MCNC: 36 MG/DL (ref 5–40)
WBC # BLD AUTO: 6.5 X10E3/UL (ref 3.4–10.8)

## 2023-05-02 NOTE — ASSESSMENT & PLAN NOTE
On Aricept and Namenda but questions if this diagnosis is accurate with her aphasia history. Follow through with the second opinion.

## 2023-05-02 NOTE — PATIENT INSTRUCTIONS
I have ordered lab tests today.  You should receive a phone call or a [employer] message with those results.  If you have not heard from us in 7-10 days, please call the office.

## 2023-05-15 RX ORDER — ROSUVASTATIN CALCIUM 10 MG/1
TABLET, COATED ORAL
Qty: 90 TABLET | Refills: 1 | Status: SHIPPED | OUTPATIENT
Start: 2023-05-15

## 2023-05-24 ENCOUNTER — TELEPHONE (OUTPATIENT)
Dept: FAMILY MEDICINE CLINIC | Facility: CLINIC | Age: 73
End: 2023-05-24

## 2023-05-24 NOTE — TELEPHONE ENCOUNTER
I spoke with the patient.  She said that they told her she has an ICAD in her brain that can cause a stroke so they recommended she start taking the 81mg aspirin daily.  I told her I would call her back after I gave you this information and let her know if your recommendation has changed.

## 2023-05-24 NOTE — TELEPHONE ENCOUNTER
Is there a reason she is asking this?  It's not recommended unless a person has had an event due to blockage of arteries and her issue was a bleed.  So I don't recommend it.

## 2023-05-24 NOTE — TELEPHONE ENCOUNTER
Caller: Geraldine Holloway    Relationship: Self    Best call back number: [phone]    What is the best time to reach you: ANYTIME    Who are you requesting to speak with (clinical staff, provider,  specific staff member): CLINICAL TEAM    What was the call regarding: PATIENT WOULD LIKE TO KNOW IF SHE CAN ADD A 81 MG ASPIRIN TO HER DAILY REGIMEN.     Do you require a callback: PLEASE CALL TO DISCUSS AND ADVISE ON THIS ISSUE.

## 2023-08-08 PROBLEM — N81.6 RECTOCELE: Status: RESOLVED | Noted: 2020-06-01 | Resolved: 2023-08-08

## 2023-08-08 PROBLEM — Z82.49 FAMILY HISTORY OF EARLY CAD: Status: RESOLVED | Noted: 2017-03-13 | Resolved: 2023-08-08

## 2023-08-08 PROBLEM — F01.50 VASCULAR DEMENTIA: Status: ACTIVE | Noted: 2023-08-08

## 2023-08-08 PROBLEM — N81.83 INCOMPETENCE OR WEAKENING OF RECTOVAGINAL TISSUE: Status: RESOLVED | Noted: 2020-06-01 | Resolved: 2023-08-08

## 2023-08-08 PROBLEM — N81.82 INCOMPETENCE OR WEAKENING OF PUBOCERVICAL TISSUE: Status: RESOLVED | Noted: 2020-06-01 | Resolved: 2023-08-08

## 2023-08-08 PROBLEM — N81.12 CYSTOCELE, LATERAL: Status: RESOLVED | Noted: 2020-06-01 | Resolved: 2023-08-08

## 2023-08-08 PROBLEM — N81.11 CYSTOCELE, MIDLINE: Status: RESOLVED | Noted: 2020-06-01 | Resolved: 2023-08-08

## 2023-08-08 PROBLEM — N81.2 UTEROVAGINAL PROLAPSE, INCOMPLETE: Status: RESOLVED | Noted: 2020-04-27 | Resolved: 2023-08-08

## 2023-08-08 PROBLEM — N81.4 UTEROVAGINAL PROLAPSE: Status: RESOLVED | Noted: 2020-04-27 | Resolved: 2023-08-08

## 2023-08-08 PROBLEM — N81.89 LOSS OF PERINEAL BODY, FEMALE: Status: RESOLVED | Noted: 2020-06-01 | Resolved: 2023-08-08

## 2023-08-08 PROBLEM — N81.5 VAGINAL ENTEROCELE: Status: RESOLVED | Noted: 2020-06-01 | Resolved: 2023-08-08

## 2023-08-15 ENCOUNTER — OFFICE VISIT (OUTPATIENT)
Dept: FAMILY MEDICINE CLINIC | Facility: CLINIC | Age: 73
End: 2023-08-15
Payer: MEDICARE

## 2023-08-15 VITALS
HEART RATE: 55 BPM | TEMPERATURE: 97.9 F | BODY MASS INDEX: 35.7 KG/M2 | SYSTOLIC BLOOD PRESSURE: 128 MMHG | DIASTOLIC BLOOD PRESSURE: 86 MMHG | OXYGEN SATURATION: 95 % | WEIGHT: 201.5 LBS | HEIGHT: 63 IN

## 2023-08-15 DIAGNOSIS — I69.920 APHASIA, LATE EFFECT OF CEREBROVASCULAR DISEASE: Primary | ICD-10-CM

## 2023-08-15 DIAGNOSIS — E78.2 MIXED HYPERLIPIDEMIA: ICD-10-CM

## 2023-08-15 DIAGNOSIS — I10 ESSENTIAL HYPERTENSION: ICD-10-CM

## 2023-08-15 DIAGNOSIS — F01.50 VASCULAR DEMENTIA WITHOUT BEHAVIORAL DISTURBANCE, PSYCHOTIC DISTURBANCE, MOOD DISTURBANCE, OR ANXIETY, UNSPECIFIED DEMENTIA SEVERITY: ICD-10-CM

## 2023-08-15 DIAGNOSIS — Z63.8 STRESS DUE TO FAMILY TENSION: ICD-10-CM

## 2023-08-15 PROBLEM — N17.9 AKI (ACUTE KIDNEY INJURY): Status: RESOLVED | Noted: 2022-05-16 | Resolved: 2023-08-15

## 2023-08-15 PROBLEM — N39.3 FEMALE STRESS INCONTINENCE: Status: RESOLVED | Noted: 2020-06-01 | Resolved: 2023-08-15

## 2023-08-15 PROBLEM — E87.6 HYPOKALEMIA: Status: RESOLVED | Noted: 2022-05-15 | Resolved: 2023-08-15

## 2023-08-15 PROBLEM — R09.81 SINUS CONGESTION: Status: RESOLVED | Noted: 2023-05-01 | Resolved: 2023-08-15

## 2023-08-15 PROBLEM — E86.0 DEHYDRATION: Status: RESOLVED | Noted: 2022-05-15 | Resolved: 2023-08-15

## 2023-08-15 PROCEDURE — 3079F DIAST BP 80-89 MM HG: CPT | Performed by: FAMILY MEDICINE

## 2023-08-15 PROCEDURE — 3074F SYST BP LT 130 MM HG: CPT | Performed by: FAMILY MEDICINE

## 2023-08-15 PROCEDURE — 1160F RVW MEDS BY RX/DR IN RCRD: CPT | Performed by: FAMILY MEDICINE

## 2023-08-15 PROCEDURE — 99214 OFFICE O/P EST MOD 30 MIN: CPT | Performed by: FAMILY MEDICINE

## 2023-08-15 PROCEDURE — 1159F MED LIST DOCD IN RCRD: CPT | Performed by: FAMILY MEDICINE

## 2023-08-15 SDOH — SOCIAL STABILITY - SOCIAL INSECURITY: OTHER SPECIFIED PROBLEMS RELATED TO PRIMARY SUPPORT GROUP: Z63.8

## 2023-08-15 NOTE — ASSESSMENT & PLAN NOTE
There is a lot of complexity here because you have overall been doing well since your stroke and you don't seem different to me but these interactions are brief.  I think the full driving evaluation is worthwhile and will give more information.

## 2023-08-15 NOTE — PATIENT INSTRUCTIONS
Start by cutting the Namenda in half twice a day for a few days and then drop to 1/2 pill once a day for 3 days and then stop it.  Also cut Aricept in half for 4 days and take 1/2 daily and then stop.    I agree with starting an aspirin 81mg due to the intracranial atherosclerotic disease on CT.    Plan a repeat neuropsych eval in a year or two to look for change. I personally have not seen any change in you since right after your stroke but our visit is brief.  You did correct me on a few details too.

## 2023-08-15 NOTE — PROGRESS NOTES
"Subjective     Geraldine Holloway is a 72 y.o. female who presents with   Chief Complaint   Patient presents with    Hypertension    Hyperlipidemia    Hypothyroidism       History of Present Illness     She's frustrated because she had another psychological assessment and feels like her son Tim \"threw her under the bus\".  Her son hasn't been visiting and her sister, Vivian, is the primary social connection and they talk daily.  She does not have the diagnosis of Alzheimer's but with Vascular neurocognitive disorder.  She has been having more headaches and would like to come off the namenda and Aricept.  She doesn't think she needs all of this medication.  They suggested a driving evaluation and she may do that.  She is active with friends and family.  She has support and is in counseling.     ICAD on last CT and 81mg of aspirin was recommended by neurology.     Hypertension, hyperlipidemia and Hypothyroidism here for follow up.  Stable overall but some mention that her aphasia is worse.      The following data was reviewed by: Verito Pulido MD on 08/15/2023:    Data reviewed : Consultant notes psychological assessment.       Review of Systems     Objective     /86 (BP Location: Right arm, Patient Position: Sitting, Cuff Size: Large Adult)   Pulse 55   Temp 97.9 øF (36.6 øC) (Oral)   Ht 160 cm (62.99\")   Wt 91.4 kg (201 lb 8 oz)   SpO2 95%   BMI 35.70 kg/mý     Physical Exam  Constitutional:       Appearance: Normal appearance.   Neck:      Vascular: No carotid bruit.   Cardiovascular:      Rate and Rhythm: Normal rate and regular rhythm.      Heart sounds: Normal heart sounds.   Pulmonary:      Effort: Pulmonary effort is normal.      Breath sounds: Normal breath sounds.   Neurological:      Mental Status: She is alert.   Psychiatric:         Behavior: Behavior normal.       Procedures     Assessment & Plan   Diagnoses and all orders for this visit:    1. Aphasia, late effect of cerebrovascular " disease (Primary)  Assessment & Plan:  With signs of atherosclerosis on imaging so resuming aspirin.       2. Vascular dementia without behavioral disturbance, psychotic disturbance, mood disturbance, or anxiety, unspecified dementia severity  Assessment & Plan:  S/P two assessments and no Alzheimers and with worsening headaches, we've decided to taper away Namenda and Aricept.      3. Stress due to family tension  Assessment & Plan:  There is a lot of complexity here because you have overall been doing well since your stroke and you don't seem different to me but these interactions are brief.  I think the full driving evaluation is worthwhile and will give more information.        4. Essential hypertension  Assessment & Plan:  On losartan HCT and atenolol      5. Mixed hyperlipidemia  Assessment & Plan:  On statin therapy           Discussion    Patient Instructions   Start by cutting the Namenda in half twice a day for a few days and then drop to 1/2 pill once a day for 3 days and then stop it.  Also cut Aricept in half for 4 days and take 1/2 daily and then stop.    I agree with starting an aspirin 81mg due to the intracranial atherosclerotic disease on CT.    Plan a repeat neuropsych eval in a year or two to look for change. I personally have not seen any change in you since right after your stroke but our visit is brief.  You did correct me on a few details too.               Verito Pulido MD

## 2023-08-15 NOTE — ASSESSMENT & PLAN NOTE
S/P two assessments and no Alzheimers and with worsening headaches, we've decided to taper away Namenda and Aricept.

## 2023-08-27 RX ORDER — ATENOLOL 50 MG/1
TABLET ORAL
Qty: 180 TABLET | Refills: 1 | Status: SHIPPED | OUTPATIENT
Start: 2023-08-27

## 2023-11-02 RX ORDER — SERTRALINE HYDROCHLORIDE 100 MG/1
TABLET, FILM COATED ORAL
Qty: 135 TABLET | Refills: 10 | Status: SHIPPED | OUTPATIENT
Start: 2023-11-02

## 2023-11-02 RX ORDER — PREDNISONE 10 MG/1
TABLET ORAL
COMMUNITY

## 2023-11-02 NOTE — TELEPHONE ENCOUNTER
Rx Refill Note  Requested Prescriptions     Signed Prescriptions Disp Refills    sertraline (ZOLOFT) 100 MG tablet 135 tablet 10     Sig: TAKE 1 AND 1/2 TABLETS EVERY DAY     Authorizing Provider: STEPHEN ROWE     Ordering User: GAL VELEZ      Last office visit with prescribing clinician: 8/15/2023   Next office visit with prescribing clinician: 12/15/2023     Gal Velez CMA  11/02/23, 08:06 EDT

## 2023-12-17 RX ORDER — ROSUVASTATIN CALCIUM 10 MG/1
TABLET, COATED ORAL
Qty: 90 TABLET | Refills: 1 | Status: SHIPPED | OUTPATIENT
Start: 2023-12-17

## 2024-01-22 RX ORDER — SERTRALINE HYDROCHLORIDE 100 MG/1
150 TABLET, FILM COATED ORAL DAILY
Qty: 135 TABLET | Refills: 3 | Status: SHIPPED | OUTPATIENT
Start: 2024-01-22

## 2024-01-22 NOTE — TELEPHONE ENCOUNTER
Caller: Holloway, Geraldine L    Relationship: Self    Best call back number:     Requested Prescriptions:   Requested Prescriptions     Pending Prescriptions Disp Refills    sertraline (ZOLOFT) 100 MG tablet 135 tablet 10     Sig: Take 1.5 tablets by mouth Daily.        Pharmacy where request should be sent:  [facility]  [address] [place] [address] [phone]    Last office visit with prescribing clinician: 8/15/2023   Last telemedicine visit with prescribing clinician: Visit date not found   Next office visit with prescribing clinician: 3/29/2024     Additional details provided by patient: PATIENT SAYS SHE TAKES TWO PILLS A DAY    Does the patient have less than a 3 day supply:  [x] Yes  [] No    Would you like a call back once the refill request has been completed: [] Yes [x] No    If the office needs to give you a call back, can they leave a voicemail: [] Yes [] No    James Trimble Rep   01/22/24 15:27 EST

## 2024-02-26 RX ORDER — ATENOLOL 50 MG/1
50 TABLET ORAL 2 TIMES DAILY
Qty: 180 TABLET | Refills: 1 | Status: SHIPPED | OUTPATIENT
Start: 2024-02-26

## 2024-02-26 RX ORDER — LEVOTHYROXINE SODIUM 112 UG/1
112 TABLET ORAL EVERY MORNING
Qty: 90 TABLET | Refills: 1 | Status: SHIPPED | OUTPATIENT
Start: 2024-02-26

## 2024-02-26 RX ORDER — ROSUVASTATIN CALCIUM 10 MG/1
10 TABLET, COATED ORAL DAILY
Qty: 90 TABLET | Refills: 1 | Status: SHIPPED | OUTPATIENT
Start: 2024-02-26

## 2024-02-26 RX ORDER — SERTRALINE HYDROCHLORIDE 100 MG/1
150 TABLET, FILM COATED ORAL DAILY
Qty: 135 TABLET | Refills: 1 | Status: SHIPPED | OUTPATIENT
Start: 2024-02-26

## 2024-03-29 ENCOUNTER — OFFICE VISIT (OUTPATIENT)
Dept: FAMILY MEDICINE CLINIC | Facility: CLINIC | Age: 74
End: 2024-03-29
Payer: MEDICARE

## 2024-03-29 VITALS
RESPIRATION RATE: 16 BRPM | HEIGHT: 63 IN | BODY MASS INDEX: 35.95 KG/M2 | WEIGHT: 202.9 LBS | HEART RATE: 56 BPM | SYSTOLIC BLOOD PRESSURE: 142 MMHG | OXYGEN SATURATION: 96 % | DIASTOLIC BLOOD PRESSURE: 90 MMHG

## 2024-03-29 DIAGNOSIS — M54.50 MIDLINE LOW BACK PAIN WITHOUT SCIATICA, UNSPECIFIED CHRONICITY: ICD-10-CM

## 2024-03-29 DIAGNOSIS — E78.2 MIXED HYPERLIPIDEMIA: ICD-10-CM

## 2024-03-29 DIAGNOSIS — Z63.8 STRESS DUE TO FAMILY TENSION: ICD-10-CM

## 2024-03-29 DIAGNOSIS — I10 ESSENTIAL HYPERTENSION: Primary | ICD-10-CM

## 2024-03-29 DIAGNOSIS — I69.920 APHASIA, LATE EFFECT OF CEREBROVASCULAR DISEASE: ICD-10-CM

## 2024-03-29 DIAGNOSIS — E03.9 ACQUIRED HYPOTHYROIDISM: ICD-10-CM

## 2024-03-29 PROBLEM — F01.50 VASCULAR DEMENTIA: Status: ACTIVE | Noted: 2023-05-01

## 2024-03-29 RX ORDER — ASPIRIN 81 MG/1
81 TABLET ORAL DAILY
COMMUNITY

## 2024-03-29 RX ORDER — TIZANIDINE 4 MG/1
4 TABLET ORAL EVERY 8 HOURS PRN
Qty: 45 TABLET | Refills: 2 | Status: SHIPPED | OUTPATIENT
Start: 2024-03-29

## 2024-03-29 RX ORDER — ATOGEPANT 60 MG/1
TABLET ORAL
COMMUNITY
Start: 2024-03-24

## 2024-03-29 RX ORDER — CLOTRIMAZOLE AND BETAMETHASONE DIPROPIONATE 10; .64 MG/G; MG/G
CREAM TOPICAL
COMMUNITY
Start: 2023-12-29 | End: 2024-12-28

## 2024-03-29 SDOH — SOCIAL STABILITY - SOCIAL INSECURITY: OTHER SPECIFIED PROBLEMS RELATED TO PRIMARY SUPPORT GROUP: Z63.8

## 2024-03-29 NOTE — PROGRESS NOTES
"Subjective     Geraldine Holloway is a 73 y.o. female who presents with   Chief Complaint   Patient presents with    Hypertension    Hyperlipidemia    Hypothyroidism       History of Present Illness      Hypertension on atenolol 50mg twice a day, losartan and hct without issue.  Hyperlipidemia on rosuvastatin.  Hypothyroidism on levothyroxine 112mcg.    Migraine headaches and on Qullipta with a lot of help.  She says she has daily headaches that just resolve on their own.  She gets migraine headaches every few days.  She's working with  Dr. Harper.  She will take Tylenol at times.   History of stroke with residual Aphasia.     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 We had tried dementia medications but did not find them helpful and she has been stable for many years.     She's having some low back pain.  It doesn't bother her enough to do do physical therapy.              Review of Systems     Objective     /90 (BP Location: Left arm, Patient Position: Sitting, Cuff Size: Large Adult)   Pulse 56   Resp 16   Ht 160 cm (63\")   Wt 92 kg (202 lb 14.4 oz)   SpO2 96%   Breastfeeding No   BMI 35.94 kg/m²     Physical Exam  Constitutional:       Appearance: Normal appearance.   Neck:      Vascular: No carotid bruit.      Comments: Normal thyroid  Cardiovascular:      Rate and Rhythm: Normal rate and regular rhythm.      Heart sounds: Normal heart sounds.   Pulmonary:      Effort: Pulmonary effort is normal.      Breath sounds: Normal breath sounds.   Neurological:      Mental Status: She is alert. Mental status is at baseline.      Comments: Speech hesitancy   Psychiatric:         Behavior: Behavior normal.         Procedures     Assessment & Plan   Diagnoses and all orders for this visit:    1. Essential hypertension (Primary)  -     Comprehensive Metabolic Panel  -     CBC & Differential    2. Mixed " hyperlipidemia  -     Comprehensive Metabolic Panel  -     Lipid Panel    3. Stress due to family tension    4. Aphasia, late effect of cerebrovascular disease    5. Acquired hypothyroidism  -     TSH    6. Midline low back pain without sciatica, unspecified chronicity  -     tiZANidine (ZANAFLEX) 4 MG tablet; Take 1 tablet by mouth Every 8 (Eight) Hours As Needed for Muscle Spasms. Cut it in half during the day  Dispense: 45 tablet; Refill: 2         Discussion    Patient Instructions   We talked about having [facility] come out to the house for your back.    I have ordered lab tests today.  You should receive a phone call or a [employer] message with those results.  If you have not heard from us in 7-10 days, please call the office.      We may add a low dose of amlodipine 2.5mg if your blood pressure is up next time.                  Verito Pulido MD

## 2024-03-29 NOTE — PATIENT INSTRUCTIONS
We talked about having [facility] come out to the house for your back.    I have ordered lab tests today.  You should receive a phone call or a [employer] message with those results.  If you have not heard from us in 7-10 days, please call the office.      We may add a low dose of amlodipine 2.5mg if your blood pressure is up next time.

## 2024-03-30 LAB
ALBUMIN SERPL-MCNC: 4.8 G/DL (ref 3.5–5.2)
ALBUMIN/GLOB SERPL: 2.2 G/DL
ALP SERPL-CCNC: 97 U/L (ref 39–117)
ALT SERPL-CCNC: 12 U/L (ref 1–33)
AST SERPL-CCNC: 18 U/L (ref 1–32)
BASOPHILS # BLD AUTO: 0.07 10*3/MM3 (ref 0–0.2)
BASOPHILS NFR BLD AUTO: 1 % (ref 0–1.5)
BILIRUB SERPL-MCNC: 0.8 MG/DL (ref 0–1.2)
BUN SERPL-MCNC: 17 MG/DL (ref 8–23)
BUN/CREAT SERPL: 16.5 (ref 7–25)
CALCIUM SERPL-MCNC: 10.6 MG/DL (ref 8.6–10.5)
CHLORIDE SERPL-SCNC: 101 MMOL/L (ref 98–107)
CHOLEST SERPL-MCNC: 173 MG/DL (ref 0–200)
CO2 SERPL-SCNC: 27.6 MMOL/L (ref 22–29)
CREAT SERPL-MCNC: 1.03 MG/DL (ref 0.57–1)
EGFRCR SERPLBLD CKD-EPI 2021: 57.2 ML/MIN/1.73
EOSINOPHIL # BLD AUTO: 0.21 10*3/MM3 (ref 0–0.4)
EOSINOPHIL NFR BLD AUTO: 3 % (ref 0.3–6.2)
ERYTHROCYTE [DISTWIDTH] IN BLOOD BY AUTOMATED COUNT: 12.8 % (ref 12.3–15.4)
GLOBULIN SER CALC-MCNC: 2.2 GM/DL
GLUCOSE SERPL-MCNC: 90 MG/DL (ref 65–99)
HCT VFR BLD AUTO: 43.9 % (ref 34–46.6)
HDLC SERPL-MCNC: 79 MG/DL (ref 40–60)
HGB BLD-MCNC: 15 G/DL (ref 12–15.9)
IMM GRANULOCYTES # BLD AUTO: 0.02 10*3/MM3 (ref 0–0.05)
IMM GRANULOCYTES NFR BLD AUTO: 0.3 % (ref 0–0.5)
LDLC SERPL CALC-MCNC: 72 MG/DL (ref 0–100)
LYMPHOCYTES # BLD AUTO: 2.79 10*3/MM3 (ref 0.7–3.1)
LYMPHOCYTES NFR BLD AUTO: 40.3 % (ref 19.6–45.3)
MCH RBC QN AUTO: 31.4 PG (ref 26.6–33)
MCHC RBC AUTO-ENTMCNC: 34.2 G/DL (ref 31.5–35.7)
MCV RBC AUTO: 92 FL (ref 79–97)
MONOCYTES # BLD AUTO: 0.4 10*3/MM3 (ref 0.1–0.9)
MONOCYTES NFR BLD AUTO: 5.8 % (ref 5–12)
NEUTROPHILS # BLD AUTO: 3.44 10*3/MM3 (ref 1.7–7)
NEUTROPHILS NFR BLD AUTO: 49.6 % (ref 42.7–76)
NRBC BLD AUTO-RTO: 0 /100 WBC (ref 0–0.2)
PLATELET # BLD AUTO: 213 10*3/MM3 (ref 140–450)
POTASSIUM SERPL-SCNC: 3.8 MMOL/L (ref 3.5–5.2)
PROT SERPL-MCNC: 7 G/DL (ref 6–8.5)
RBC # BLD AUTO: 4.77 10*6/MM3 (ref 3.77–5.28)
SODIUM SERPL-SCNC: 141 MMOL/L (ref 136–145)
TRIGL SERPL-MCNC: 132 MG/DL (ref 0–150)
TSH SERPL DL<=0.005 MIU/L-ACNC: 2.06 UIU/ML (ref 0.27–4.2)
VLDLC SERPL CALC-MCNC: 22 MG/DL (ref 5–40)
WBC # BLD AUTO: 6.93 10*3/MM3 (ref 3.4–10.8)

## 2024-05-14 ENCOUNTER — TELEPHONE (OUTPATIENT)
Dept: FAMILY MEDICINE CLINIC | Facility: CLINIC | Age: 74
End: 2024-05-14
Payer: MEDICARE

## 2024-05-14 DIAGNOSIS — M20.40 HAMMER TOE, UNSPECIFIED LATERALITY: Primary | ICD-10-CM

## 2024-05-26 ENCOUNTER — PATIENT MESSAGE (OUTPATIENT)
Dept: FAMILY MEDICINE CLINIC | Facility: CLINIC | Age: 74
End: 2024-05-26
Payer: MEDICARE

## 2024-05-27 RX ORDER — LOSARTAN POTASSIUM AND HYDROCHLOROTHIAZIDE 12.5; 1 MG/1; MG/1
1 TABLET ORAL EVERY MORNING
Qty: 90 TABLET | Refills: 1 | Status: SHIPPED | OUTPATIENT
Start: 2024-05-27

## 2024-05-27 RX ORDER — LOSARTAN POTASSIUM AND HYDROCHLOROTHIAZIDE 12.5; 1 MG/1; MG/1
1 TABLET ORAL EVERY MORNING
Qty: 90 TABLET | Refills: 1 | Status: SHIPPED | OUTPATIENT
Start: 2024-05-27 | End: 2024-05-27 | Stop reason: SDUPTHER

## 2024-08-07 ENCOUNTER — OFFICE VISIT (OUTPATIENT)
Dept: FAMILY MEDICINE CLINIC | Facility: CLINIC | Age: 74
End: 2024-08-07
Payer: MEDICARE

## 2024-08-07 VITALS
HEART RATE: 63 BPM | BODY MASS INDEX: 35.95 KG/M2 | WEIGHT: 202.9 LBS | OXYGEN SATURATION: 96 % | HEIGHT: 63 IN | SYSTOLIC BLOOD PRESSURE: 120 MMHG | DIASTOLIC BLOOD PRESSURE: 76 MMHG

## 2024-08-07 DIAGNOSIS — I69.920 APHASIA, LATE EFFECT OF CEREBROVASCULAR DISEASE: ICD-10-CM

## 2024-08-07 DIAGNOSIS — Z78.0 POSTMENOPAUSAL: ICD-10-CM

## 2024-08-07 DIAGNOSIS — E03.9 ACQUIRED HYPOTHYROIDISM: ICD-10-CM

## 2024-08-07 DIAGNOSIS — E83.52 HYPERCALCEMIA: ICD-10-CM

## 2024-08-07 DIAGNOSIS — Z11.59 ENCOUNTER FOR HEPATITIS C SCREENING TEST FOR LOW RISK PATIENT: ICD-10-CM

## 2024-08-07 DIAGNOSIS — E78.2 MIXED HYPERLIPIDEMIA: ICD-10-CM

## 2024-08-07 DIAGNOSIS — G40.209 LOCALIZATION-RELATED (FOCAL) (PARTIAL) SYMPTOMATIC EPILEPSY AND EPILEPTIC SYNDROMES WITH COMPLEX PARTIAL SEIZURES, NOT INTRACTABLE, WITHOUT STATUS EPILEPTICUS: ICD-10-CM

## 2024-08-07 DIAGNOSIS — Z12.11 SCREENING FOR COLON CANCER: ICD-10-CM

## 2024-08-07 DIAGNOSIS — I10 ESSENTIAL HYPERTENSION: Primary | ICD-10-CM

## 2024-08-07 RX ORDER — RIMEGEPANT SULFATE 75 MG/75MG
TABLET, ORALLY DISINTEGRATING ORAL
COMMUNITY

## 2024-08-07 RX ORDER — TIZANIDINE 2 MG/1
2 TABLET ORAL
COMMUNITY
Start: 2024-05-10 | End: 2024-11-06

## 2024-08-07 NOTE — PROGRESS NOTES
"Subjective     Geraldine Holloway is a 73 y.o. female who presents with   Chief Complaint   Patient presents with    Hypertension       Hypertension    Here for follow up.  She has hypertension on atenolol and losartan/hct 100/12.5 and controlled.  She has a history of hemorrhagic stroke with residual aphasia but without further decline.  There is some family drama with her sons and neither is talking to her.  Her sister Mila drives her and is a support.  She has her grandson and three great grandchildren in her house.  She gives her sister money for driving her.     Hypothyroidism on levothyroxine 112mcg.    Migraine headaches on Magnesium with Nurtec.  She takes clonazepam, Keppra and lamictal for seizures.     She had high calcium on labs and has stopped supplements.     She does have a living will and will bring a copy.              Review of Systems     Objective     /76   Pulse 63   Ht 160 cm (63\")   Wt 92 kg (202 lb 14.4 oz)   SpO2 96%   Breastfeeding No   BMI 35.94 kg/m²     Physical Exam  Constitutional:       Appearance: Normal appearance.   Neck:      Vascular: No carotid bruit.   Cardiovascular:      Rate and Rhythm: Normal rate and regular rhythm.      Heart sounds: Normal heart sounds.   Pulmonary:      Effort: Pulmonary effort is normal.      Breath sounds: Normal breath sounds.   Neurological:      Mental Status: She is alert.   Psychiatric:         Behavior: Behavior normal.         Procedures     Assessment & Plan   Diagnoses and all orders for this visit:    1. Essential hypertension (Primary)    2. Mixed hyperlipidemia  -     Comprehensive Metabolic Panel  -     Lipid Panel    3. Acquired hypothyroidism  -     TSH    4. Aphasia, late effect of cerebrovascular disease  Assessment & Plan:  Without progression      5. Localization-related (focal) (partial) symptomatic epilepsy and epileptic syndromes with complex partial seizures, not intractable, without status epilepticus  Assessment & " Plan:  On Lamictal and Keppra      6. Hypercalcemia  -     Comprehensive Metabolic Panel    7. Encounter for hepatitis C screening test for low risk patient  -     Hepatitis C Antibody    8. Postmenopausal  -     DEXA Bone Density Axial; Future    9. Screening for colon cancer  -     Cologuard - Stool, Per Rectum; Future       Class 2 Severe Obesity (BMI >=35 and <=39.9). Obesity-related health conditions include the following: hypertension. Obesity is unchanged. BMI is is above average; BMI management plan is completed. We discussed portion control.     Discussion    Patient Instructions   I have ordered lab tests today.  You should receive a phone call or a [email or web address] message with those results.  If you have not heard from us in 7-10 days, please call the office.      We did about the drama you have going on at home and that you have a  that has prepared your living will.  You might want to talk to him about trying to figure out your responsibilities.              Verito Pulido MD

## 2024-08-07 NOTE — PATIENT INSTRUCTIONS
I have ordered lab tests today.  You should receive a phone call or a MyChart message with those results.  If you have not heard from us in 7-10 days, please call the office.      We did about the drama you have going on at home and that you have a  that has prepared your living will.  You might want to talk to him about trying to figure out your responsibilities.

## 2024-08-08 LAB
ALBUMIN SERPL-MCNC: 4.6 G/DL (ref 3.5–5.2)
ALBUMIN/GLOB SERPL: 2.3 G/DL
ALP SERPL-CCNC: 117 U/L (ref 39–117)
ALT SERPL-CCNC: 12 U/L (ref 1–33)
AST SERPL-CCNC: 18 U/L (ref 1–32)
BILIRUB SERPL-MCNC: 0.6 MG/DL (ref 0–1.2)
BUN SERPL-MCNC: 13 MG/DL (ref 8–23)
BUN/CREAT SERPL: 12.6 (ref 7–25)
CALCIUM SERPL-MCNC: 9.5 MG/DL (ref 8.6–10.5)
CHLORIDE SERPL-SCNC: 100 MMOL/L (ref 98–107)
CHOLEST SERPL-MCNC: 155 MG/DL (ref 0–200)
CO2 SERPL-SCNC: 25.4 MMOL/L (ref 22–29)
CREAT SERPL-MCNC: 1.03 MG/DL (ref 0.57–1)
EGFRCR SERPLBLD CKD-EPI 2021: 57.2 ML/MIN/1.73
GLOBULIN SER CALC-MCNC: 2 GM/DL
GLUCOSE SERPL-MCNC: 96 MG/DL (ref 65–99)
HCV IGG SERPL QL IA: NON REACTIVE
HDLC SERPL-MCNC: 60 MG/DL (ref 40–60)
LDLC SERPL CALC-MCNC: 53 MG/DL (ref 0–100)
POTASSIUM SERPL-SCNC: 3.4 MMOL/L (ref 3.5–5.2)
PROT SERPL-MCNC: 6.6 G/DL (ref 6–8.5)
SODIUM SERPL-SCNC: 141 MMOL/L (ref 136–145)
TRIGL SERPL-MCNC: 273 MG/DL (ref 0–150)
TSH SERPL DL<=0.005 MIU/L-ACNC: 2.31 UIU/ML (ref 0.27–4.2)
VLDLC SERPL CALC-MCNC: 42 MG/DL (ref 5–40)

## 2024-08-13 ENCOUNTER — PATIENT MESSAGE (OUTPATIENT)
Dept: FAMILY MEDICINE CLINIC | Facility: CLINIC | Age: 74
End: 2024-08-13

## 2024-08-27 RX ORDER — SERTRALINE HYDROCHLORIDE 100 MG/1
150 TABLET, FILM COATED ORAL DAILY
Qty: 135 TABLET | Refills: 1 | Status: SHIPPED | OUTPATIENT
Start: 2024-08-27

## 2024-08-31 RX ORDER — ROSUVASTATIN CALCIUM 10 MG/1
10 TABLET, COATED ORAL DAILY
Qty: 90 TABLET | Refills: 1 | Status: SHIPPED | OUTPATIENT
Start: 2024-08-31

## 2024-09-30 RX ORDER — LEVOTHYROXINE SODIUM 112 MCG
112 TABLET ORAL EVERY MORNING
Qty: 90 TABLET | Refills: 1 | Status: SHIPPED | OUTPATIENT
Start: 2024-09-30

## 2024-10-01 ENCOUNTER — PATIENT MESSAGE (OUTPATIENT)
Dept: FAMILY MEDICINE CLINIC | Facility: CLINIC | Age: 74
End: 2024-10-01
Payer: MEDICARE

## 2024-10-15 RX ORDER — ATENOLOL 50 MG/1
50 TABLET ORAL 2 TIMES DAILY
Qty: 180 TABLET | Refills: 1 | Status: SHIPPED | OUTPATIENT
Start: 2024-10-15

## 2024-11-24 RX ORDER — LOSARTAN POTASSIUM AND HYDROCHLOROTHIAZIDE 12.5; 1 MG/1; MG/1
1 TABLET ORAL EVERY MORNING
Qty: 90 TABLET | Refills: 1 | Status: SHIPPED | OUTPATIENT
Start: 2024-11-24

## 2024-11-27 ENCOUNTER — OFFICE VISIT (OUTPATIENT)
Dept: FAMILY MEDICINE CLINIC | Facility: CLINIC | Age: 74
End: 2024-11-27
Payer: MEDICARE

## 2024-11-27 VITALS
RESPIRATION RATE: 16 BRPM | OXYGEN SATURATION: 97 % | HEART RATE: 45 BPM | HEIGHT: 63 IN | DIASTOLIC BLOOD PRESSURE: 74 MMHG | WEIGHT: 201.4 LBS | BODY MASS INDEX: 35.68 KG/M2 | SYSTOLIC BLOOD PRESSURE: 126 MMHG

## 2024-11-27 DIAGNOSIS — Z78.0 POSTMENOPAUSAL: ICD-10-CM

## 2024-11-27 DIAGNOSIS — E78.2 MIXED HYPERLIPIDEMIA: ICD-10-CM

## 2024-11-27 DIAGNOSIS — Z23 ENCOUNTER FOR VACCINATION: ICD-10-CM

## 2024-11-27 DIAGNOSIS — Z63.8 STRESS DUE TO FAMILY TENSION: ICD-10-CM

## 2024-11-27 DIAGNOSIS — E03.9 ACQUIRED HYPOTHYROIDISM: ICD-10-CM

## 2024-11-27 DIAGNOSIS — I10 ESSENTIAL HYPERTENSION: Primary | ICD-10-CM

## 2024-11-27 DIAGNOSIS — I69.920 APHASIA, LATE EFFECT OF CEREBROVASCULAR DISEASE: ICD-10-CM

## 2024-11-27 DIAGNOSIS — G40.209 LOCALIZATION-RELATED (FOCAL) (PARTIAL) SYMPTOMATIC EPILEPSY AND EPILEPTIC SYNDROMES WITH COMPLEX PARTIAL SEIZURES, NOT INTRACTABLE, WITHOUT STATUS EPILEPTICUS: ICD-10-CM

## 2024-11-27 DIAGNOSIS — F33.41 RECURRENT MAJOR DEPRESSIVE DISORDER, IN PARTIAL REMISSION: ICD-10-CM

## 2024-11-27 PROBLEM — F01.A0 MILD VASCULAR DEMENTIA WITHOUT BEHAVIORAL DISTURBANCE, PSYCHOTIC DISTURBANCE, MOOD DISTURBANCE, OR ANXIETY: Status: ACTIVE | Noted: 2023-05-01

## 2024-11-27 PROCEDURE — 90662 IIV NO PRSV INCREASED AG IM: CPT | Performed by: FAMILY MEDICINE

## 2024-11-27 PROCEDURE — 99214 OFFICE O/P EST MOD 30 MIN: CPT | Performed by: FAMILY MEDICINE

## 2024-11-27 PROCEDURE — 3078F DIAST BP <80 MM HG: CPT | Performed by: FAMILY MEDICINE

## 2024-11-27 PROCEDURE — G0008 ADMIN INFLUENZA VIRUS VAC: HCPCS | Performed by: FAMILY MEDICINE

## 2024-11-27 PROCEDURE — 3074F SYST BP LT 130 MM HG: CPT | Performed by: FAMILY MEDICINE

## 2024-11-27 RX ORDER — TIZANIDINE 2 MG/1
TABLET ORAL
COMMUNITY
Start: 2024-09-05

## 2024-11-27 SDOH — SOCIAL STABILITY - SOCIAL INSECURITY: OTHER SPECIFIED PROBLEMS RELATED TO PRIMARY SUPPORT GROUP: Z63.8

## 2024-11-27 NOTE — PATIENT INSTRUCTIONS
We did your first flu shot.    We are rescheduling your bone density.    No labs today but we will next time.    We discussed your social stressors and you sound like you have a good plan in place.      Keep your next appointment as scheduled.
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
## 2025-01-23 ENCOUNTER — HOSPITAL ENCOUNTER (OUTPATIENT)
Facility: HOSPITAL | Age: 75
Discharge: HOME | End: 2025-01-23
Admitting: FAMILY MEDICINE
Payer: MEDICARE

## 2025-01-23 DIAGNOSIS — Z78.0 POSTMENOPAUSAL: ICD-10-CM

## 2025-01-23 PROCEDURE — 77080 DXA BONE DENSITY AXIAL: CPT

## 2025-02-07 RX ORDER — SERTRALINE HYDROCHLORIDE 100 MG/1
150 TABLET, FILM COATED ORAL DAILY
Qty: 135 TABLET | Refills: 1 | Status: SHIPPED | OUTPATIENT
Start: 2025-02-07

## 2025-02-21 RX ORDER — ROSUVASTATIN CALCIUM 10 MG/1
10 TABLET, COATED ORAL DAILY
Qty: 90 TABLET | Refills: 1 | Status: SHIPPED | OUTPATIENT
Start: 2025-02-21

## 2025-04-07 RX ORDER — LEVOTHYROXINE SODIUM 112 MCG
112 TABLET ORAL EVERY MORNING
Qty: 90 TABLET | Refills: 1 | Status: SHIPPED | OUTPATIENT
Start: 2025-04-07

## 2025-04-30 ENCOUNTER — OFFICE VISIT (OUTPATIENT)
Dept: FAMILY MEDICINE CLINIC | Facility: CLINIC | Age: 75
End: 2025-04-30
Payer: MEDICARE

## 2025-04-30 VITALS
SYSTOLIC BLOOD PRESSURE: 120 MMHG | BODY MASS INDEX: 35.19 KG/M2 | WEIGHT: 198.6 LBS | OXYGEN SATURATION: 98 % | HEART RATE: 48 BPM | HEIGHT: 63 IN | DIASTOLIC BLOOD PRESSURE: 72 MMHG

## 2025-04-30 DIAGNOSIS — E78.2 MIXED HYPERLIPIDEMIA: Primary | ICD-10-CM

## 2025-04-30 DIAGNOSIS — E03.9 ACQUIRED HYPOTHYROIDISM: ICD-10-CM

## 2025-04-30 DIAGNOSIS — G40.209 LOCALIZATION-RELATED (FOCAL) (PARTIAL) SYMPTOMATIC EPILEPSY AND EPILEPTIC SYNDROMES WITH COMPLEX PARTIAL SEIZURES, NOT INTRACTABLE, WITHOUT STATUS EPILEPTICUS: ICD-10-CM

## 2025-04-30 DIAGNOSIS — I69.920 APHASIA, LATE EFFECT OF CEREBROVASCULAR DISEASE: ICD-10-CM

## 2025-04-30 DIAGNOSIS — I10 ESSENTIAL HYPERTENSION: ICD-10-CM

## 2025-04-30 NOTE — PATIENT INSTRUCTIONS
I have ordered lab tests today.  You should receive a phone call or a [employer] message with those results.  If you have not heard from us in 7-10 days, please call the office.      You are eligible for an RSV vaccine and the shingles vaccine.  You are also eligible for COVID booster.  You have to get the shingles and RSV at the pharmacy.

## 2025-04-30 NOTE — PROGRESS NOTES
"Subjective     Geraldine Holloway is a 74 y.o. female who presents with   Chief Complaint   Patient presents with    Hypertension       Hypertension       She's going to be moving into a tiny house on her sister's property.  She's in the process of fixing up her house to sell it.  She's not longer driving because she doesn't have a car right now.  Her sister Vivian is her primary support.      Hypertension on losartan hct  and atenolol.    Hyperlipidemia on rosuvastatin 10mg.    Hypothyroidism on synthroid 112mcg.    No seizures in several years and is levitracetam and lamotrigine.  She also has clonazepam for anxiety from Dr. Mckeon.    Her headaches have been problematic.  She's seeing Jovana Obregon/Dr. Harper and having issues tolerating her medications. She's just Tylenol for her headaches. She's to have a sleep study to look into this further.   She's on tizanidine at night currently.     Prior stroke with aphasia 9/28/2009.  There's been no change in her brain function since then.  Her son was concerned and she had an assessment  but it is not a progressive situation.            Review of Systems     Objective     /72   Pulse (!) 48   Ht 160 cm (62.99\")   Wt 90.1 kg (198 lb 9.6 oz)   SpO2 98%   Breastfeeding No   BMI 35.19 kg/m²     Physical Exam  Constitutional:       Appearance: Normal appearance.   Cardiovascular:      Rate and Rhythm: Normal rate and regular rhythm.      Heart sounds: Normal heart sounds.   Pulmonary:      Effort: Pulmonary effort is normal.      Breath sounds: Normal breath sounds.   Neurological:      Mental Status: She is alert.   Psychiatric:         Behavior: Behavior normal.         Procedures     Assessment & Plan   Diagnoses and all orders for this visit:    1. Mixed hyperlipidemia (Primary)  -     Comprehensive Metabolic Panel  -     Lipid Panel    2. Essential hypertension  -     CBC & Differential  -     Comprehensive Metabolic Panel    3. Acquired hypothyroidism  -     " TSH    4. Aphasia, late effect of cerebrovascular disease    5. Localization-related (focal) (partial) symptomatic epilepsy and epileptic syndromes with complex partial seizures, not intractable, without status epilepticus             Discussion    Patient Instructions   I have ordered lab tests today.  You should receive a phone call or a [employer] message with those results.  If you have not heard from us in 7-10 days, please call the office.      You are eligible for an RSV vaccine and the shingles vaccine.  You are also eligible for COVID booster.  You have to get the shingles and RSV at the pharmacy.              Verito Pulido MD

## 2025-05-01 LAB
ALBUMIN SERPL-MCNC: 4.7 G/DL (ref 3.8–4.8)
ALP SERPL-CCNC: 108 IU/L (ref 44–121)
ALT SERPL-CCNC: 14 IU/L (ref 0–32)
AST SERPL-CCNC: 23 IU/L (ref 0–40)
BASOPHILS # BLD AUTO: 0.1 X10E3/UL (ref 0–0.2)
BASOPHILS NFR BLD AUTO: 1 %
BILIRUB SERPL-MCNC: 0.6 MG/DL (ref 0–1.2)
BUN SERPL-MCNC: 12 MG/DL (ref 8–27)
BUN/CREAT SERPL: 12 (ref 12–28)
CALCIUM SERPL-MCNC: 10.2 MG/DL (ref 8.7–10.3)
CHLORIDE SERPL-SCNC: 101 MMOL/L (ref 96–106)
CHOLEST SERPL-MCNC: 180 MG/DL (ref 100–199)
CO2 SERPL-SCNC: 26 MMOL/L (ref 20–29)
CREAT SERPL-MCNC: 1.04 MG/DL (ref 0.57–1)
EGFRCR SERPLBLD CKD-EPI 2021: 56 ML/MIN/1.73
EOSINOPHIL # BLD AUTO: 0.1 X10E3/UL (ref 0–0.4)
EOSINOPHIL NFR BLD AUTO: 2 %
ERYTHROCYTE [DISTWIDTH] IN BLOOD BY AUTOMATED COUNT: 12.9 % (ref 11.7–15.4)
GLOBULIN SER CALC-MCNC: 2.3 G/DL (ref 1.5–4.5)
GLUCOSE SERPL-MCNC: 96 MG/DL (ref 70–99)
HCT VFR BLD AUTO: 42.2 % (ref 34–46.6)
HDLC SERPL-MCNC: 67 MG/DL
HGB BLD-MCNC: 14.4 G/DL (ref 11.1–15.9)
IMM GRANULOCYTES # BLD AUTO: 0 X10E3/UL (ref 0–0.1)
IMM GRANULOCYTES NFR BLD AUTO: 0 %
LDLC SERPL CALC-MCNC: 82 MG/DL (ref 0–99)
LYMPHOCYTES # BLD AUTO: 2.5 X10E3/UL (ref 0.7–3.1)
LYMPHOCYTES NFR BLD AUTO: 45 %
MCH RBC QN AUTO: 31.3 PG (ref 26.6–33)
MCHC RBC AUTO-ENTMCNC: 34.1 G/DL (ref 31.5–35.7)
MCV RBC AUTO: 92 FL (ref 79–97)
MONOCYTES # BLD AUTO: 0.3 X10E3/UL (ref 0.1–0.9)
MONOCYTES NFR BLD AUTO: 6 %
NEUTROPHILS # BLD AUTO: 2.6 X10E3/UL (ref 1.4–7)
NEUTROPHILS NFR BLD AUTO: 46 %
PLATELET # BLD AUTO: 200 X10E3/UL (ref 150–450)
POTASSIUM SERPL-SCNC: 3.9 MMOL/L (ref 3.5–5.2)
PROT SERPL-MCNC: 7 G/DL (ref 6–8.5)
RBC # BLD AUTO: 4.6 X10E6/UL (ref 3.77–5.28)
SODIUM SERPL-SCNC: 143 MMOL/L (ref 134–144)
TRIGL SERPL-MCNC: 185 MG/DL (ref 0–149)
TSH SERPL DL<=0.005 MIU/L-ACNC: 1.72 UIU/ML (ref 0.45–4.5)
VLDLC SERPL CALC-MCNC: 31 MG/DL (ref 5–40)
WBC # BLD AUTO: 5.6 X10E3/UL (ref 3.4–10.8)

## 2025-05-08 RX ORDER — ATENOLOL 50 MG/1
50 TABLET ORAL 2 TIMES DAILY
Qty: 180 TABLET | Refills: 1 | Status: SHIPPED | OUTPATIENT
Start: 2025-05-08

## 2025-05-26 RX ORDER — LOSARTAN POTASSIUM AND HYDROCHLOROTHIAZIDE 12.5; 1 MG/1; MG/1
1 TABLET ORAL EVERY MORNING
Qty: 90 TABLET | Refills: 1 | Status: SHIPPED | OUTPATIENT
Start: 2025-05-26

## 2025-06-22 RX ORDER — SERTRALINE HYDROCHLORIDE 100 MG/1
150 TABLET, FILM COATED ORAL DAILY
Qty: 135 TABLET | Refills: 1 | Status: SHIPPED | OUTPATIENT
Start: 2025-06-22

## 2025-07-07 RX ORDER — ROSUVASTATIN CALCIUM 10 MG/1
10 TABLET, COATED ORAL DAILY
Qty: 90 TABLET | Refills: 1 | Status: SHIPPED | OUTPATIENT
Start: 2025-07-07

## 2025-07-11 ENCOUNTER — OFFICE VISIT (OUTPATIENT)
Dept: FAMILY MEDICINE CLINIC | Facility: CLINIC | Age: 75
End: 2025-07-11
Payer: MEDICARE

## 2025-07-11 VITALS
HEART RATE: 50 BPM | OXYGEN SATURATION: 98 % | TEMPERATURE: 97.7 F | BODY MASS INDEX: 35.1 KG/M2 | HEIGHT: 63 IN | SYSTOLIC BLOOD PRESSURE: 120 MMHG | WEIGHT: 198.1 LBS | DIASTOLIC BLOOD PRESSURE: 70 MMHG

## 2025-07-11 DIAGNOSIS — G40.209 LOCALIZATION-RELATED (FOCAL) (PARTIAL) SYMPTOMATIC EPILEPSY AND EPILEPTIC SYNDROMES WITH COMPLEX PARTIAL SEIZURES, NOT INTRACTABLE, WITHOUT STATUS EPILEPTICUS: ICD-10-CM

## 2025-07-11 DIAGNOSIS — I69.920 APHASIA, LATE EFFECT OF CEREBROVASCULAR DISEASE: ICD-10-CM

## 2025-07-11 DIAGNOSIS — E78.2 MIXED HYPERLIPIDEMIA: ICD-10-CM

## 2025-07-11 DIAGNOSIS — I10 ESSENTIAL HYPERTENSION: ICD-10-CM

## 2025-07-11 DIAGNOSIS — G43.E09 CHRONIC MIGRAINE WITH AURA, NOT INTRACTABLE, WITHOUT STATUS MIGRAINOSUS: ICD-10-CM

## 2025-07-11 DIAGNOSIS — E03.9 ACQUIRED HYPOTHYROIDISM: ICD-10-CM

## 2025-07-11 DIAGNOSIS — Z63.8 STRESS DUE TO FAMILY TENSION: ICD-10-CM

## 2025-07-11 DIAGNOSIS — F01.A0 MILD VASCULAR DEMENTIA WITHOUT BEHAVIORAL DISTURBANCE, PSYCHOTIC DISTURBANCE, MOOD DISTURBANCE, OR ANXIETY: ICD-10-CM

## 2025-07-11 DIAGNOSIS — Z00.00 ANNUAL PHYSICAL EXAM: Primary | ICD-10-CM

## 2025-07-11 SDOH — SOCIAL STABILITY - SOCIAL INSECURITY: OTHER SPECIFIED PROBLEMS RELATED TO PRIMARY SUPPORT GROUP: Z63.8

## 2025-07-11 NOTE — ASSESSMENT & PLAN NOTE
This seems to have settled down some with avoiding some family members.  I did encourage a living will/advanced directives to avoid conflict should she be in that situation.
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
## 2025-08-21 RX ORDER — LEVOTHYROXINE SODIUM 112 MCG
112 TABLET ORAL EVERY MORNING
Qty: 90 TABLET | Refills: 1 | Status: SHIPPED | OUTPATIENT
Start: 2025-08-21

## (undated) DEVICE — ADHS SKIN DERMABOND TOP ADVANCED

## (undated) DEVICE — 1010 S-DRAPE TOWEL DRAPE 10/BX: Brand: STERI-DRAPE™

## (undated) DEVICE — DEV SUT GRSPR CLOSUR 15CM 14G

## (undated) DEVICE — IRR EAR 2OZ

## (undated) DEVICE — ANTIBACTERIAL UNDYED BRAIDED (POLYGLACTIN 910), SYNTHETIC ABSORBABLE SUTURE: Brand: COATED VICRYL

## (undated) DEVICE — ENDOPATH XCEL UNIVERSAL TROCAR STABLILITY SLEEVES: Brand: ENDOPATH XCEL

## (undated) DEVICE — RETR STAY HK ELAS SHRP 5MM PK/8

## (undated) DEVICE — DUAL CUT SAGITTAL BLADE

## (undated) DEVICE — ENDOCUT SCISSOR TIP, DISPOSABLE: Brand: RENEW

## (undated) DEVICE — SUT PDS 2/0 CT2 27IN Z333H

## (undated) DEVICE — TOTAL TRAY, 16FR 10ML SIL FOLEY, URN: Brand: MEDLINE

## (undated) DEVICE — SHEARS WITH ROTICULATOR TECHNOLOGY: Brand: ENDO MINI-SHEARS

## (undated) DEVICE — BLADE SHIELD SCALPEL HOLDER: Brand: DEVON

## (undated) DEVICE — CATHETER,FOLEY,100%SILICONE,16FR,10ML,LF: Brand: MEDLINE

## (undated) DEVICE — DRSNG GZ PETROLTM XEROFORM CURAD 1X8IN STRL

## (undated) DEVICE — SYR CONTRL LUERLOK 10CC

## (undated) DEVICE — GLV SURG BIOGEL LTX PF 6 1/2

## (undated) DEVICE — DRP Z/FRICTION 10X16IN

## (undated) DEVICE — SINGLE BASIN: Brand: CARDINAL HEALTH

## (undated) DEVICE — IRRIGATOR BULB ASEPTO 60CC STRL

## (undated) DEVICE — ENDOPATH XCEL BLADELESS TROCARS WITH STABILITY SLEEVES: Brand: ENDOPATH XCEL

## (undated) DEVICE — DEV COND GAS LAP INSUFLOW W/LUER CONN

## (undated) DEVICE — ST IRR CYSTO W/SPK 77IN LF

## (undated) DEVICE — DRAPE,UNDERBUTTOCKS,PCH,STERILE: Brand: MEDLINE

## (undated) DEVICE — MAT FLR ABSORBENT LG 4FT 10 2.5FT

## (undated) DEVICE — DRAPE,REIN 53X77,STERILE: Brand: MEDLINE

## (undated) DEVICE — COVER,MAYO STAND,STERILE: Brand: MEDLINE

## (undated) DEVICE — IMPLANTABLE DEVICE
Type: IMPLANTABLE DEVICE | Site: KNEE | Status: NON-FUNCTIONAL
Brand: BIOMET® BONE CEMENT R

## (undated) DEVICE — CONTN STRL 32OZ

## (undated) DEVICE — RETR RNG GEN2 31.8X18.3CM

## (undated) DEVICE — COUNT NDL MAG FM/BLCK 40COUNT

## (undated) DEVICE — 2, DISPOSABLE SUCTION/IRRIGATOR WITH DISPOSABLE TIP: Brand: STRYKEFLOW

## (undated) DEVICE — SUT VIC 3/0 TIES J104T

## (undated) DEVICE — HARMONIC ACE +7 LAPAROSCOPIC SHEARS ADVANCED HEMOSTASIS 5MM DIAMETER 36CM SHAFT LENGTH  FOR USE WITH GRAY HAND PIECE ONLY: Brand: HARMONIC ACE

## (undated) DEVICE — TROCAR: Brand: KII OPTICAL ACCESS SYSTEM

## (undated) DEVICE — STPLR SKIN VISISTAT WD 35CT

## (undated) DEVICE — GLV SURG PREMIERPRO ORTHO LTX PF SZ7.5 BRN

## (undated) DEVICE — 3M™ STERI-DRAPE™ INSTRUMENT POUCH 1018L: Brand: STERI-DRAPE™

## (undated) DEVICE — RUBBERBAND LF STRL PK/2

## (undated) DEVICE — PENCL ES MEGADINE EZ/CLEAN BUTN W/HOLSTR 10FT

## (undated) DEVICE — SUT VIC 0 CT 36IN J958H

## (undated) DEVICE — VAGINAL PACKING: Brand: DEROYAL

## (undated) DEVICE — NON-ADHERENT PADS PREPACK: Brand: TELFA

## (undated) DEVICE — SUT VIC 2/0 CT2 27IN J269H

## (undated) DEVICE — CEMENT MIXING SYSTEM WITH FEMORAL BREAKWAY NOZZLE: Brand: REVOLUTION

## (undated) DEVICE — PK KN TOTL 40

## (undated) DEVICE — SUT PROLN 1 CT 30IN 8435H

## (undated) DEVICE — SUT PA DBL ARM TC22 T1/2CR 0 36IN

## (undated) DEVICE — SYR LL 3CC

## (undated) DEVICE — APPL CHLORAPREP HI/LITE 26ML ORNG

## (undated) DEVICE — INTENDED FOR TISSUE SEPARATION, AND OTHER PROCEDURES THAT REQUIRE A SHARP SURGICAL BLADE TO PUNCTURE OR CUT.: Brand: BARD-PARKER ® CARBON RIB-BACK BLADES

## (undated) DEVICE — NEEDLE, QUINCKE, 20GX3.5": Brand: MEDLINE

## (undated) DEVICE — LOU LAVH: Brand: MEDLINE INDUSTRIES, INC.

## (undated) DEVICE — NDL HYPO PRECISIONGLIDE REG 25G 1 1/2

## (undated) DEVICE — UNDERCAST PADDING: Brand: DEROYAL

## (undated) DEVICE — PUNCH BIOP 2MM

## (undated) DEVICE — TUBING, SUCTION, 1/4" X 20', STRAIGHT: Brand: MEDLINE INDUSTRIES, INC.

## (undated) DEVICE — COVER,TABLE,HEAVY DUTY,79"X110",STRL: Brand: MEDLINE

## (undated) DEVICE — PK HYST VAG 40

## (undated) DEVICE — GLV SURG BIOGEL LTX PF 7 1/2

## (undated) DEVICE — SPNG GZ WOVN 4X4IN 12PLY 10/BX STRL

## (undated) DEVICE — ENDOPATH PNEUMONEEDLE INSUFFLATION NEEDLES WITH LUER LOCK CONNECTORS 120MM: Brand: ENDOPATH

## (undated) DEVICE — 3M™ STERI-DRAPE™ INSTRUMENT POUCH 1018: Brand: STERI-DRAPE™

## (undated) DEVICE — LEGGINGS, PAIR, CLEAR, STERILE: Brand: MEDLINE

## (undated) DEVICE — SPNG LAP 18X18IN LF STRL PK/5

## (undated) DEVICE — KT DRN EVAC WND PVC PCH WTROC RND 10F400

## (undated) DEVICE — BNDG ELAS ELITE V/CLOSE 4IN 5YD LF STRL

## (undated) DEVICE — SYRINGE, LUER LOCK, 60ML: Brand: MEDLINE

## (undated) DEVICE — CARDIOVASCULAR SPLIT DRAPE II, OPTIMA: Brand: CONVERTORS

## (undated) DEVICE — SUT VIC 0 CT1 36IN J946H

## (undated) DEVICE — SYR LUERLOK 50ML

## (undated) DEVICE — SOL NACL 0.9PCT 1000ML

## (undated) DEVICE — SUT VIC 0 TIES 18IN J912G

## (undated) DEVICE — PAD,ABDOMINAL,8"X10",ST,LF: Brand: MEDLINE

## (undated) DEVICE — LAPAROSCOPIC SMOKE FILTRATION SYSTEM: Brand: PALL LAPAROSHIELD® PLUS LAPAROSCOPIC SMOKE FILTRATION SYSTEM

## (undated) DEVICE — CONTAINER,SPECIMEN,OR STERILE,4OZ: Brand: MEDLINE

## (undated) DEVICE — APPL HEMOS FOR DELIVERY FLOSEAL

## (undated) DEVICE — DISPOSABLE MONOPOLAR ENDOSCOPIC CORD 10 FT. (3M): Brand: KIRWAN

## (undated) DEVICE — SUT PROLN 1 CT1 30IN 8425H

## (undated) DEVICE — DECANT BG O JET